# Patient Record
Sex: FEMALE | Race: AMERICAN INDIAN OR ALASKA NATIVE | HISPANIC OR LATINO | Employment: FULL TIME | URBAN - METROPOLITAN AREA
[De-identification: names, ages, dates, MRNs, and addresses within clinical notes are randomized per-mention and may not be internally consistent; named-entity substitution may affect disease eponyms.]

---
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
## 2019-01-04 ENCOUNTER — OFFICE VISIT (OUTPATIENT)
Dept: FAMILY MEDICINE | Facility: CLINIC | Age: 19
End: 2019-01-04
Payer: COMMERCIAL

## 2019-01-04 VITALS
HEIGHT: 65 IN | TEMPERATURE: 98.1 F | BODY MASS INDEX: 45.9 KG/M2 | WEIGHT: 275.5 LBS | OXYGEN SATURATION: 99 % | RESPIRATION RATE: 24 BRPM | SYSTOLIC BLOOD PRESSURE: 130 MMHG | DIASTOLIC BLOOD PRESSURE: 66 MMHG | HEART RATE: 100 BPM

## 2019-01-04 DIAGNOSIS — F90.9 ATTENTION DEFICIT HYPERACTIVITY DISORDER (ADHD), UNSPECIFIED ADHD TYPE: Primary | ICD-10-CM

## 2019-01-04 DIAGNOSIS — E66.01 CLASS 3 SEVERE OBESITY DUE TO EXCESS CALORIES WITHOUT SERIOUS COMORBIDITY WITH BODY MASS INDEX (BMI) OF 45.0 TO 49.9 IN ADULT (H): ICD-10-CM

## 2019-01-04 DIAGNOSIS — E66.813 CLASS 3 SEVERE OBESITY DUE TO EXCESS CALORIES WITHOUT SERIOUS COMORBIDITY WITH BODY MASS INDEX (BMI) OF 45.0 TO 49.9 IN ADULT (H): ICD-10-CM

## 2019-01-04 DIAGNOSIS — F32.5 MAJOR DEPRESSION IN COMPLETE REMISSION (H): ICD-10-CM

## 2019-01-04 PROCEDURE — 99214 OFFICE O/P EST MOD 30 MIN: CPT | Performed by: NURSE PRACTITIONER

## 2019-01-04 RX ORDER — DEXTROAMPHETAMINE SACCHARATE, AMPHETAMINE ASPARTATE MONOHYDRATE, DEXTROAMPHETAMINE SULFATE, AMPHETAMINE SULFATE 6.25; 6.25; 6.25; 6.25 MG/1; MG/1; MG/1; MG/1
1 CAPSULE, EXTENDED RELEASE ORAL DAILY
Qty: 30 CAPSULE | Refills: 0 | Status: SHIPPED | OUTPATIENT
Start: 2019-01-04 | End: 2019-02-05

## 2019-01-04 ASSESSMENT — MIFFLIN-ST. JEOR: SCORE: 2025.54

## 2019-01-04 ASSESSMENT — ANXIETY QUESTIONNAIRES
3. WORRYING TOO MUCH ABOUT DIFFERENT THINGS: SEVERAL DAYS
1. FEELING NERVOUS, ANXIOUS, OR ON EDGE: SEVERAL DAYS
7. FEELING AFRAID AS IF SOMETHING AWFUL MIGHT HAPPEN: SEVERAL DAYS
2. NOT BEING ABLE TO STOP OR CONTROL WORRYING: SEVERAL DAYS
5. BEING SO RESTLESS THAT IT IS HARD TO SIT STILL: SEVERAL DAYS
GAD7 TOTAL SCORE: 7
6. BECOMING EASILY ANNOYED OR IRRITABLE: SEVERAL DAYS
IF YOU CHECKED OFF ANY PROBLEMS ON THIS QUESTIONNAIRE, HOW DIFFICULT HAVE THESE PROBLEMS MADE IT FOR YOU TO DO YOUR WORK, TAKE CARE OF THINGS AT HOME, OR GET ALONG WITH OTHER PEOPLE: SOMEWHAT DIFFICULT

## 2019-01-04 ASSESSMENT — PATIENT HEALTH QUESTIONNAIRE - PHQ9
SUM OF ALL RESPONSES TO PHQ QUESTIONS 1-9: 6
5. POOR APPETITE OR OVEREATING: SEVERAL DAYS

## 2019-01-04 ASSESSMENT — PAIN SCALES - GENERAL: PAINLEVEL: NO PAIN (0)

## 2019-01-04 NOTE — LETTER
My Depression Action Plan  Name: Tana Hernandez   Date of Birth 2000  Date: 1/4/2019    My doctor: Deana Wood   My clinic: [address] [address] [phone]          GREEN    ZONE   Good Control    What it looks like:     Things are going generally well. You have normal up s and down s. You may even feel depressed from time to time, but bad moods usually last less than a day.   What you need to do:  1. Continue to care for yourself (see self care plan)  2. Check your depression survival kit and update it as needed  3. Follow your physician s recommendations including any medication.  4. Do not stop taking medication unless you consult with your physician first.           YELLOW         ZONE Getting Worse    What it looks like:     Depression is starting to interfere with your life.     It may be hard to get out of bed; you may be starting to isolate yourself from others.    Symptoms of depression are starting to last most all day and this has happened for several days.     You may have suicidal thoughts but they are not constant.   What you need to do:     1. Call your care team, your response to treatment will improve if you keep your care team informed of your progress. Yellow periods are signs an adjustment may need to be made.     2. Continue your self-care, even if you have to fake it!    3. Talk to someone in your support network    4. Open up your depression survival kit           RED    ZONE Medical Alert - Get Help    What it looks like:     Depression is seriously interfering with your life.     You may experience these or other symptoms: You can t get out of bed most days, can t work or engage in other necessary activities, you have trouble taking care of basic hygiene, or basic responsibilities, thoughts of suicide or death that will not go away, self-injurious behavior.     What you need to do:  1. Call your care team and  request a same-day appointment. If they are not available (weekends or after hours) call your local crisis line, emergency room or 911.            Depression Self Care Plan / Survival Kit    Self-Care for Depression  Here s the deal. Your body and mind are really not as separate as most people think.  What you do and think affects how you feel and how you feel influences what you do and think. This means if you do things that people who feel good do, it will help you feel better.  Sometimes this is all it takes.  There is also a place for medication and therapy depending on how severe your depression is, so be sure to consult with your medical provider and/ or Behavioral Health Consultant if your symptoms are worsening or not improving.     In order to better manage my stress, I will:    Exercise  Get some form of exercise, every day. This will help reduce pain and release endorphins, the  feel good  chemicals in your brain. This is almost as good as taking antidepressants!  This is not the same as joining a gym and then never going! (they count on that by the way ) It can be as simple as just going for a walk or doing some gardening, anything that will get you moving.      Hygiene   Maintain good hygiene (Get out of bed in the morning, Make your bed, Brush your teeth, Take a shower, and Get dressed like you were going to work, even if you are unemployed).  If your clothes don't fit try to get ones that do.    Diet  I will strive to eat foods that are good for me, drink plenty of water, and avoid excessive sugar, caffeine, alcohol, and other mood-altering substances.  Some foods that are helpful in depression are: complex carbohydrates, B vitamins, flaxseed, fish or fish oil, fresh fruits and vegetables.    Psychotherapy  I agree to participate in Individual Therapy (if recommended).    Medication  If prescribed medications, I agree to take them.  Missing doses can result in serious side effects.  I understand that  drinking alcohol, or other illicit drug use, may cause potential side effects.  I will not stop my medication abruptly without first discussing it with my provider.    Staying Connected With Others  I will stay in touch with my friends, family members, and my primary care provider/team.    Use your imagination  Be creative.  We all have a creative side; it doesn t matter if it s oil painting, sand castles, or mud pies! This will also kick up the endorphins.    Witness Beauty  (AKA stop and smell the roses) Take a look outside, even in mid-winter. Notice colors, textures. Watch the squirrels and birds.     Service to others  Be of service to others.  There is always someone else in need.  By helping others we can  get out of ourselves  and remember the really important things.  This also provides opportunities for practicing all the other parts of the program.    Humor  Laugh and be silly!  Adjust your TV habits for less news and crime-drama and more comedy.    Control your stress  Try breathing deep, massage therapy, biofeedback, and meditation. Find time to relax each day.     My support system    Clinic Contact:  Phone number:    Contact 1:  Phone number:    Contact 2:  Phone number:    Taoism/:  Phone number:    Therapist:  Phone number:    Local crisis center:    Phone number:    Other community support:  Phone number:

## 2019-01-04 NOTE — PROGRESS NOTES
"  SUBJECTIVE:   Tana Hernandez is a 18 year old female who presents to clinic today for the following health issues:      Medication Followup of Prozac, Adderall    Taking Medication as prescribed: yes    Side Effects:  None    Medication Helping Symptoms:  Yes, with Adderall, patient states she can tell when med wears off and then can tell when the next dose \"kicks\" in. Wondering about changing brands as this was discussed prior. Prozac doing well       Piedad would like to try my days again.  She felt that it worked well without any side effects.  She is feeling a little more jittery and anxious with the Adderall.  She did get a prior authorization from her insurance company last July, and this is effective until 2021    She otherwise states she is doing well.  Works as a PCA part-time.  She is unhappy about her relationship with her mother.  She would like to be able to get along with her mother, but states she is an alcoholic, and her drinking really bothers her.  1 of her aunts is discussing organizing and intervention.    She has some concern about her weight.  She states she is vague and, but does tend to snack on junk food.  She did have an appointment scheduled with nutritionist but overslept and missed that appointment.  She is concerned about focusing too much on counting calories and such, as she tends to then obsess, and had an eating disorder when she was in high school.    Problem list and histories reviewed & adjusted, as indicated.  Additional history: as documented    BP Readings from Last 3 Encounters:   01/04/19 130/66 (96 %/ 48 %)*   12/12/18 118/78   10/09/18 112/80 (46 %/ 94 %)*     *BP percentiles are based on the August 2017 AAP Clinical Practice Guideline for girls    Wt Readings from Last 3 Encounters:   01/04/19 125 kg (275 lb 8 oz) (>99 %)*   12/12/18 128.8 kg (284 lb) (>99 %)*   10/09/18 109 kg (240 lb 6.4 oz) (>99 %)*     * Growth percentiles are based on CDC (Girls, 2-20 Years) data. " "                   Reviewed and updated as needed this visit by clinical staff       Reviewed and updated as needed this visit by Provider         ROS:  Constitutional, HEENT, cardiovascular, pulmonary, gi and gu systems are negative, except as otherwise noted.    OBJECTIVE:     /66   Pulse 100   Temp 98.1  F (36.7  C) (Temporal)   Resp 24   Ht 1.651 m (5' 5\")   Wt 125 kg (275 lb 8 oz)   SpO2 99%   BMI 45.85 kg/m    Body mass index is 45.85 kg/m .   GENERAL: Very pleasant overweight female in no acute distress  RESP: lungs clear to auscultation - no rales, rhonchi or wheezes  CV: regular rates and rhythm, normal S1 S2, no S3 or S4 and no murmur, click or rub  PSYCH: mentation appears normal, affect normal/bright        ASSESSMENT/PLAN:     Problem List Items Addressed This Visit        Medium    Attention deficit hyperactivity disorder (ADHD), unspecified ADHD type    Relevant Medications    Amphet-Dextroamphet 3-Bead ER (MYDAYIS) 25 MG CP24           Prescription written for my day is 25 mg 1 capsule daily.  She has an appointment scheduled first part of February for a well exam, will review her response to my day us at that time    Encouraged to schedule an appointment with nutritionist to help devise an eating plan.  Definitely would like to see her less than 200 pounds.  Encouraged to engage in active form of exercise.  Eat more vegetables and fruits, avoid snacks.  She is vegetarian, so was not eating meat.  She does eat eggs.  Discussed some of these protein sources, but she is concerned about focusing on calories and not wanting to get back into an eating disorder situation.  I think the input from a nutritionist will be extremely helpful for her    She is doing very well on current dose of Prozac.  Does score 6 on a PHQ 9, but this tends to be primarily related to sleeping too much.  She denies any thoughts of self-harm    JAMA Landa [facility]  "

## 2019-01-05 ASSESSMENT — ANXIETY QUESTIONNAIRES: GAD7 TOTAL SCORE: 7

## 2019-01-05 NOTE — PROGRESS NOTES
SUBJECTIVE:  Patient was referred to me by Deana Wood NP for a consultation regarding Nexplanon device for contraceptive management.  She is requesting this device as it is more long term and she will not have to remember to take anything.    We reviewed other methods of birth control, including the pros and cons of other options including IUD.    Patient read through the information on Nexplanon and has no particular questions.  I gave her a copy of the patient handout from the company's website for review.    Patient still is very interested in using this form of birth control, and has no further questions for me.  Past Medical History:   Diagnosis Date     Anxiety      Depressive disorder        No past surgical history on file.       Allergies   Allergen Reactions     Wellbutrin [Bupropion] Hives       Current Outpatient Medications   Medication     Amphet-Dextroamphet 3-Bead ER (MYDAYIS) 25 MG CP24     FLUoxetine (PROZAC) 40 MG capsule     No current facility-administered medications for this visit.        No family history on file.    Social History     Socioeconomic History     Marital status: Single     Spouse name: Not on file     Number of children: Not on file     Years of education: Not on file     Highest education level: Not on file   Social Needs     Financial resource strain: Not on file     Food insecurity - worry: Not on file     Food insecurity - inability: Not on file     Transportation needs - medical: Not on file     Transportation needs - non-medical: Not on file   Occupational History     Not on file   Tobacco Use     Smoking status: Never Smoker     Smokeless tobacco: Never Used   Substance and Sexual Activity     Alcohol use: No     Alcohol/week: 0.0 oz     Drug use: No     Sexual activity: No   Other Topics Concern     Parent/sibling w/ CABG, MI or angioplasty before 65F 55M? Not Asked   Social History Narrative     Not on file       ASSESSMENT:  Contraception management  Nexplanon  consultation for birth control    PLAN:  Patient would like to proceed today with placement.  Discussed risks of bleeding and infection.  Also discussed the possibility of irregular bleeding for 3-6 months and then often cessation of menses.  Sometimes intermittent spotting will continue to occur, and is often the number one cited reason for early discontinuation.  Small risk of migration of the Nexplanon device or difficulty removing the implant can also occur.  We discussed importance of self palpation of the device to insure that it is still present.      No LMP recorded.  She has no further questions for me and signed the consent form for placement.  Pregnancy test done and was negative.    PROCEDURE:  The patient was placed on exam table in supine position with her left arm flexed at the elbow.  A point ~8-10 cm from the epicondyle was marked and another point 4 cm proximal to this sivan was made on the inner arm.  This area was cleansed with chlorhexidine prep and then injected with 2mL of 1% lidocaine with epi along the track of insertion.  The Nexplanon device was opened and it was confirmed that the diallo is in the device.  Under standard sterile fashion, a small stab incision was made with the device at the just distal to the sivan closer to the elbow.  The Nexplanon device was then inserted along the direction of the two skin marks just under the skin with care not to go too deep.  Once the insertion needle was fully inserted beneath the skin, the retractor on the device was then slowly pulled back to allow the introducer needle to retract into the device, leaving the diallo behind in the arm.  The diallo was palpable in the appropriate place under the skin.  The incision was noted to be hemostatic and a pressure bandage was placed over insertion site with 4x4 gauze, Kerlix and tape.  She was instructed to ice as needed.    Patient instructed to use barrier contraception for 1 week after which she should have full  contraception effect from Nexplanon device.  She was instructed to periodically palpate device to insure placement and was invited to follow-up with me if she has any issues or questions regarding the device.  She will need to follow-up in 3 years for removal, at which time she may have replacement device inserted for continued use of this form of contraception.    Patient will follow up with Deana Wood NP for other issues and will see me for issues regarding Nexplanon device.    In addition to the Nexplanon procedure, I spent 15 minutes of face to face time with the patient, >50% of which was spent counseling on contraception options.      Kristofer Cunningham MD

## 2019-01-05 NOTE — PATIENT INSTRUCTIONS
NEXPLANON AFTERCARE INSTRUCTIONS     Keep dressing on and dry for 24 hours, then remove wrap.  Replace bandaid daily for 5 days. Keep your user card in a safe place where you'll remember it.      You may have some pain at the site of the Nexplanon insertion. You can help relieve the discomfort with Tylenol (acetaminophen), Aspirin or Advil (ibuprofen). If your discomfort worsens or you notice redness spreading on the skin around the insertion site, please call the clinic.       Irregular bleeding is common with Nexplanon, especially in the first 6-12 months of use. After one year, approximately 20% of women who use Nexplanon will stop having periods completely. Some women have longer, heavier periods. Some women will have increased spotting between periods. You may find that your periods may be hard to predict.       The Nexplanon does not protect against sexually transmitted infections including the AIDS virus (HIV), warts (HPV), gonorrhea, Chlamydia, and herpes. Condoms should be used to decrease the risk sexually transmitted infections. If you think that you have been exposed to a sexually transmitted infection, please call the clinic.       If you had Nexplanon placed for birth control, it is effective immediately if it was inserted within five days after the start of your period. If you have Nexplanon inserted at any other time during your menstrual cycle, use another method of birth control, like condoms for at least 7 days.       The Nexplanon should be removed and/or replaced by a health care provider after three years.   Warning Signs   Call the clinic if any of the following occurs:     You have bleeding, pus, or increasing redness, or pain at insertion site.     You have fever or chills     The implant comes out or you have concerns about its location.     You have a positive pregnancy test or suspect you might be pregnant.

## 2019-01-07 ENCOUNTER — OFFICE VISIT (OUTPATIENT)
Dept: FAMILY MEDICINE | Facility: CLINIC | Age: 19
End: 2019-01-07
Payer: COMMERCIAL

## 2019-01-07 ENCOUNTER — TELEPHONE (OUTPATIENT)
Dept: FAMILY MEDICINE | Facility: CLINIC | Age: 19
End: 2019-01-07

## 2019-01-07 VITALS
SYSTOLIC BLOOD PRESSURE: 128 MMHG | DIASTOLIC BLOOD PRESSURE: 66 MMHG | TEMPERATURE: 98.9 F | RESPIRATION RATE: 20 BRPM | HEART RATE: 76 BPM | BODY MASS INDEX: 46.98 KG/M2 | HEIGHT: 65 IN | OXYGEN SATURATION: 98 % | WEIGHT: 282 LBS

## 2019-01-07 DIAGNOSIS — Z30.017 ENCOUNTER FOR INITIAL PRESCRIPTION OF IMPLANTABLE SUBDERMAL CONTRACEPTIVE: Primary | ICD-10-CM

## 2019-01-07 DIAGNOSIS — Z23 NEED FOR PROPHYLACTIC VACCINATION AND INOCULATION AGAINST INFLUENZA: ICD-10-CM

## 2019-01-07 DIAGNOSIS — Z30.017 INSERTION OF IMPLANTABLE SUBDERMAL CONTRACEPTIVE: ICD-10-CM

## 2019-01-07 LAB — HCG UR QL: NEGATIVE

## 2019-01-07 PROCEDURE — 99213 OFFICE O/P EST LOW 20 MIN: CPT | Mod: 25 | Performed by: FAMILY MEDICINE

## 2019-01-07 PROCEDURE — 81025 URINE PREGNANCY TEST: CPT | Performed by: FAMILY MEDICINE

## 2019-01-07 PROCEDURE — 90471 IMMUNIZATION ADMIN: CPT | Performed by: FAMILY MEDICINE

## 2019-01-07 PROCEDURE — 90688 IIV4 VACCINE SPLT 0.5 ML IM: CPT | Performed by: FAMILY MEDICINE

## 2019-01-07 PROCEDURE — 11981 INSERTION DRUG DLVR IMPLANT: CPT | Performed by: FAMILY MEDICINE

## 2019-01-07 ASSESSMENT — ANXIETY QUESTIONNAIRES
7. FEELING AFRAID AS IF SOMETHING AWFUL MIGHT HAPPEN: NOT AT ALL
IF YOU CHECKED OFF ANY PROBLEMS ON THIS QUESTIONNAIRE, HOW DIFFICULT HAVE THESE PROBLEMS MADE IT FOR YOU TO DO YOUR WORK, TAKE CARE OF THINGS AT HOME, OR GET ALONG WITH OTHER PEOPLE: NOT DIFFICULT AT ALL
5. BEING SO RESTLESS THAT IT IS HARD TO SIT STILL: NOT AT ALL
GAD7 TOTAL SCORE: 0
3. WORRYING TOO MUCH ABOUT DIFFERENT THINGS: NOT AT ALL
6. BECOMING EASILY ANNOYED OR IRRITABLE: NOT AT ALL
2. NOT BEING ABLE TO STOP OR CONTROL WORRYING: NOT AT ALL
1. FEELING NERVOUS, ANXIOUS, OR ON EDGE: NOT AT ALL

## 2019-01-07 ASSESSMENT — PAIN SCALES - GENERAL: PAINLEVEL: NO PAIN (0)

## 2019-01-07 ASSESSMENT — PATIENT HEALTH QUESTIONNAIRE - PHQ9
SUM OF ALL RESPONSES TO PHQ QUESTIONS 1-9: 8
5. POOR APPETITE OR OVEREATING: NOT AT ALL

## 2019-01-07 ASSESSMENT — MIFFLIN-ST. JEOR: SCORE: 2055.02

## 2019-01-07 NOTE — PROGRESS NOTES

## 2019-01-09 ASSESSMENT — ANXIETY QUESTIONNAIRES: GAD7 TOTAL SCORE: 0

## 2019-01-13 ENCOUNTER — OFFICE VISIT (OUTPATIENT)
Dept: URGENT CARE | Facility: RETAIL CLINIC | Age: 19
End: 2019-01-13
Payer: COMMERCIAL

## 2019-01-13 VITALS
TEMPERATURE: 98 F | SYSTOLIC BLOOD PRESSURE: 139 MMHG | OXYGEN SATURATION: 99 % | DIASTOLIC BLOOD PRESSURE: 88 MMHG | HEART RATE: 72 BPM

## 2019-01-13 DIAGNOSIS — B30.9 VIRAL CONJUNCTIVITIS: Primary | ICD-10-CM

## 2019-01-13 PROCEDURE — 99213 OFFICE O/P EST LOW 20 MIN: CPT | Performed by: FAMILY MEDICINE

## 2019-01-13 RX ORDER — POLYMYXIN B SULFATE AND TRIMETHOPRIM 1; 10000 MG/ML; [USP'U]/ML
1 SOLUTION OPHTHALMIC EVERY 4 HOURS
Qty: 1 BOTTLE | Refills: 0 | Status: SHIPPED | OUTPATIENT
Start: 2019-01-13 | End: 2019-02-05

## 2019-01-13 NOTE — PATIENT INSTRUCTIONS
Aleve 2 tabs am and pm for pain    Artificial tears as necessary    Fill prescription only if yellow eye drainage all day  Patient Education     Viral Conjunctivitis    Viral conjunctivitis (sometimes called pink eye) is a common infection of the eye. It is very contagious. Touching the infected eye, then touching another person passes this infection. It can also be spread from one eye to the other in this same way. The most common symptoms include redness, discharge from the eye, swollen eyelids, and a gritty or scratchy feeling in the eye.  This condition will take about 7 to 10 days to go away. Artificial tears (available without a prescription) are often recommended to moisten and clean the eyes. Antibiotic eye drops often are not recommended because they will not kill the virus. But sometimes they may be prescribed by eye doctors. This is to prevent a second, bacterial infection.  Home care    Apply a towel soaked in cool water to the affected eye 3 to 4 times a day (just before applying medicine to the eye).    It is common to have mucus drainage during the night, causing the eyelids to become crusted by morning. Use a warm, wet cloth to wipe this away.    Wash any cloths used to clean the eye after one use. Don't reuse them.    If antibiotic medicines are prescribed, take them exactly as directed. Don't stop taking them until you are told to.    You may use acetaminophen or ibuprofen to control pain, unless another medicine was prescribed. (Note: If you have chronic liver or kidney disease, or if you have ever had a stomach ulcer or gastrointestinal bleeding, talk with your healthcare provider before using these medicines.) Aspirin should never be used in anyone under 18 years of age who is ill with a fever. It may cause severe liver damage.    Wash your hands before and after touching the affected eye. This helps to prevent spreading the infection to your other eye and to others.    The infected person  should avoid sharing towels, washcloths, and bedding with others. This is to prevent spreading the infection.    This illness is contagious during the first week. Children with this illness should be kept out of day care and school until the redness clears.  Follow-up care  Follow up with your healthcare provider, or as advised.  When to seek medical advice  Call your healthcare provider right away if any of these occur:    Worsening vision    Increasing pain in the eye    Increasing swelling or redness of the eyelid    Redness spreading to the face around the eye    Large amount of green or yellow drainage from the eye    Severe itching in or around the eye    Fever of 100.4 F (38 C) or higher  Date Last Reviewed: 7/1/2017 2000-2018 [employer]. [address] [place] [address], [place] [address]. All rights reserved. This information is not intended as a substitute for professional medical care. Always follow your healthcare professional's instructions.

## 2019-01-13 NOTE — PROGRESS NOTES
SUBJECTIVE:  Tana Hernandez is a 18 year old female who presents complaining of moderate left eye mattering, redness for 1 day(s).   Onset/timing: rapid.    Associated Signs and Symptoms: nasal drainage and sinus congestion  Treatment measures tried include: flushed with water   Contact wearer : No    Past Medical History:   Diagnosis Date     Anxiety      Depressive disorder      Current Outpatient Medications   Medication Sig Dispense Refill     Amphet-Dextroamphet 3-Bead ER (MYDAYIS) 25 MG CP24 Take 1 tablet by mouth daily 30 capsule 0     FLUoxetine (PROZAC) 40 MG capsule Take 1 capsule (40 mg) by mouth daily 30 capsule 3     trimethoprim-polymyxin b (POLYTRIM) 73990-5.1 UNIT/ML-% ophthalmic solution Place 1 drop into both eyes every 4 hours for 7 days 1 Bottle 0     History   Smoking Status     Never Smoker   Smokeless Tobacco     Never Used       ROS:  Review of systems negative except as stated above.    OBJECTIVE:  /88   Pulse 72   Temp 98  F (36.7  C) (Oral)   LMP 01/06/2019 (Exact Date)   SpO2 99%   General: no acute distress  Eye exam: right eye normal lid, conjunctiva, cornea, pupil and fundus, left eye abnormal findings: conjunctivitis with erythema.  Ears: normal canals, TMs bilaterally, normal TM mobility  Nose: NORMAL - no drainage, turbinates normal in size.  Neck: supple, non-tender, free range of motion, no adenopathy  Heart: NORMAL - regular rate and rhythm without murmur.  Lungs: normal and clear to auscultation    ASSESSMENT:  Viral Conjunctivitis    PLAN:  Warm packs for comfort. Hygiene measures discussed. Polytrim ophthalmic drops-1-2 drops in the affected eye(s) every 4 hours while awake for 3 days. Fill and use only  If yellow drain from eye all day.  Follow up with primary care provider if no improvement.

## 2019-01-14 ENCOUNTER — HOSPITAL ENCOUNTER (OUTPATIENT)
Dept: NUTRITION | Facility: CLINIC | Age: 19
Discharge: HOME | End: 2019-01-14
Attending: NURSE PRACTITIONER | Admitting: NURSE PRACTITIONER
Payer: COMMERCIAL

## 2019-01-14 PROCEDURE — 97802 MEDICAL NUTRITION INDIV IN: CPT | Performed by: DIETITIAN, REGISTERED

## 2019-01-15 NOTE — PROGRESS NOTES
"[facility]  Medical Nutrition Therapy    Visit Type: Initial Assessment    Tana Hernandez referred by JAMA Landa, CNP for MNT related to Obesity    Nutrition Assessment:    Anthropometrics    Height: 5' 5\"      Weight: 269 lbs/122 kg  (taken during visit)      IBW (kg): 57 kg      BMI: 45 (obesity grade III)     Weight History:  Wt Readings from Last 4 Encounters:   01/07/19 127.9 kg (282 lb) (>99 %)*   01/04/19 125 kg (275 lb 8 oz) (>99 %)*   12/12/18 128.8 kg (284 lb) (>99 %)*   10/09/18 109 kg (240 lb 6.4 oz) (>99 %)*     * Growth percentiles are based on CDC (Girls, 2-20 Years) data.   -Per chart above, pt gained 42 lbs/17.5% over the past 3 months, prior to 1/07/18, when she started to drastically reduce her oral intake.  -Over the past week, pt has lost 13 lbs/4.6%    Nutrition History  -Pt reported struggling with body image issues and has a hx of an eating disorder   -Pt reports triggers for ED: food labels, numbers (weight, calories), and being asked to change eating habits too quickly  -Pt weighs herself daily at home  -Reports that her wt had been steadily increasing over the past year so she decided to drastically reduce her oral intake over the past week, consuming only sugary coffee drinks and minimal food  -Pt is a vegetarian and strives to consume vegan meals, but will have cheese and eggs on occasion  -Hx of anxiety, depression, and ADHD (on px Adderall)  -Pt reports that Adderall suppresses her appetite     Physical Activity  -Pt reports that she is busy most of the day, on her feet  -Pt has a carpentry hobby and is building a cabinet at the moment   -Pt enjoys cleaning and spends a lot of time each day cleaning her home  -Reports that her heart rate is elevated during these activities    Nutrition Prescription  Energy:  6890-9346 kcal/day  (using, ABW and for wt loss of 1-2 lb/week)   Protein:   g/day  (.8-1g/kg)       Fluid:  1900 mL/day  (25 mL/kg using ABW) "     Food Record  Daily intake over the past week:  -2 sweetened [place] or Starbucks coffee drinks (caramel macchiato usually)  -Mt Dew Kickstart (16 oz)  -Starbucks oatmeal  -1.5 L water  -Daily oral intake is inadequate for meeting daily kcal, protein, and fiber needs, adequate in fluids  -Pt reports that she feels hungry at night but ignores the urge to eat. Low hunger during the day, which she believes is d/t Adderall side effect     Nutrition Diagnosis:  PES: Inadequate oral intake r/t disordered eating a/e/b usual dietary intake meeting <50% of estimated needs and wt loss of 13 lbs/4.6% over the past week     Nutrition Intervention:  -Recommended getting in-tune with physical signs of hunger and fullness; discussed using the hunger/fullness scale throughout the day, at least every 3 hrs  -Suggested starting meditation on a daily basis to help with anxiety (guided meditation, listening to relaxing music, going for a walk outside, or whatever feels relaxing)  -Encouraged pt to put the scale away and to not focus on the numbers, but rather how she feels  -Encouraged pt to start incorporating some of each of the food groups into her daily, but slow and gradually, as she feels comfortable  -Discussed using the palm of the hand for an estimated serving size of foods, so she doesn't have to look at the food label  -Encouraged positive self-image and to continue doing physical activities that she enjoys  -Discussed using the breath test to rate intensity of PA (aiming to get aerobic activity in each day)     Nutrition Goals:  1) Start paying attention to hunger/fullness cues  2) Meditate daily  3) Eat at least 2 snacks per day, consisting of a protein and fiber (WG, fruit, or veg)   4) Get 30 min of PA per day, using the breath test as a guide for endurance (should still be able to talk during activity)    Nutrition Follow Up / Monitoring:  Food/beverage intake, weight, scale use, PA, anxiety level     Nutrition  Recommendations:   Patient to follow-up with RD in 2 weeks.  Patient has RD contact information to call/email if needed.    Start Time: 11:05am  End Time: 12:07pm    Samira Amos RD, LD  Clinical Dietitian  [facility]: [phone]  [facility]: [phone]

## 2019-01-18 ENCOUNTER — TELEPHONE (OUTPATIENT)
Dept: FAMILY MEDICINE | Facility: CLINIC | Age: 19
End: 2019-01-18

## 2019-01-18 NOTE — TELEPHONE ENCOUNTER
Reason for call:  Patient reporting a symptom    Symptom or request: Was seen in  [facility] on Sunday for viral conjunctivitis.  Piedad reports that her eye is not any better and almost seems worse.  Piedad states when she woke up this morning there looked to be dried blood that came from the eye    Duration (how long have symptoms been present): 5 days    Have you been treated for this before? Yes    Additional comments: Piedad was given a prescription for eye drops but thought she wasn't supposed to fill it because the conjunctivitis was viral.    Phone Number patient can be reached at:  [phone]    Best Time:  any    Can we leave a detailed message on this number:  YES    Call taken on 1/18/2019 at 9:21 AM by Lindsay Mendoza

## 2019-01-18 NOTE — TELEPHONE ENCOUNTER
Called patient, instructed her to start on the eye drops/ointment that was Rx to her at the [facility] visit per provider. Patient agrees and states understanding. She will call back if necessary. Johny/MA

## 2019-02-05 ENCOUNTER — OFFICE VISIT (OUTPATIENT)
Dept: FAMILY MEDICINE | Facility: CLINIC | Age: 19
End: 2019-02-05
Payer: COMMERCIAL

## 2019-02-05 VITALS
DIASTOLIC BLOOD PRESSURE: 80 MMHG | OXYGEN SATURATION: 99 % | RESPIRATION RATE: 20 BRPM | HEART RATE: 100 BPM | HEIGHT: 65 IN | WEIGHT: 258.2 LBS | TEMPERATURE: 97.7 F | SYSTOLIC BLOOD PRESSURE: 130 MMHG | BODY MASS INDEX: 43.02 KG/M2

## 2019-02-05 DIAGNOSIS — Z00.00 WELL ADULT EXAM: Primary | ICD-10-CM

## 2019-02-05 DIAGNOSIS — I38 DIASTOLIC MURMUR: ICD-10-CM

## 2019-02-05 DIAGNOSIS — E56.9 VITAMIN DEFICIENCY: ICD-10-CM

## 2019-02-05 DIAGNOSIS — Z11.3 SCREEN FOR STD (SEXUALLY TRANSMITTED DISEASE): ICD-10-CM

## 2019-02-05 DIAGNOSIS — F90.9 ATTENTION DEFICIT HYPERACTIVITY DISORDER (ADHD), UNSPECIFIED ADHD TYPE: ICD-10-CM

## 2019-02-05 LAB — HGB BLD-MCNC: 13.9 G/DL (ref 11.7–15.7)

## 2019-02-05 PROCEDURE — 99213 OFFICE O/P EST LOW 20 MIN: CPT | Mod: 25 | Performed by: NURSE PRACTITIONER

## 2019-02-05 PROCEDURE — 85018 HEMOGLOBIN: CPT | Performed by: NURSE PRACTITIONER

## 2019-02-05 PROCEDURE — 82306 VITAMIN D 25 HYDROXY: CPT | Performed by: NURSE PRACTITIONER

## 2019-02-05 PROCEDURE — 87591 N.GONORRHOEAE DNA AMP PROB: CPT | Performed by: NURSE PRACTITIONER

## 2019-02-05 PROCEDURE — 36415 COLL VENOUS BLD VENIPUNCTURE: CPT | Performed by: NURSE PRACTITIONER

## 2019-02-05 PROCEDURE — 99395 PREV VISIT EST AGE 18-39: CPT | Performed by: NURSE PRACTITIONER

## 2019-02-05 PROCEDURE — 87491 CHLMYD TRACH DNA AMP PROBE: CPT | Performed by: NURSE PRACTITIONER

## 2019-02-05 RX ORDER — DEXTROAMPHETAMINE SACCHARATE, AMPHETAMINE ASPARTATE MONOHYDRATE, DEXTROAMPHETAMINE SULFATE, AMPHETAMINE SULFATE 6.25; 6.25; 6.25; 6.25 MG/1; MG/1; MG/1; MG/1
1 CAPSULE, EXTENDED RELEASE ORAL DAILY
Qty: 30 CAPSULE | Refills: 0 | Status: SHIPPED | OUTPATIENT
Start: 2019-02-05 | End: 2019-02-05

## 2019-02-05 RX ORDER — DEXTROAMPHETAMINE SACCHARATE, AMPHETAMINE ASPARTATE MONOHYDRATE, DEXTROAMPHETAMINE SULFATE, AMPHETAMINE SULFATE 6.25; 6.25; 6.25; 6.25 MG/1; MG/1; MG/1; MG/1
1 CAPSULE, EXTENDED RELEASE ORAL DAILY
Qty: 30 CAPSULE | Refills: 0 | Status: SHIPPED | OUTPATIENT
Start: 2019-02-05 | End: 2019-04-24

## 2019-02-05 ASSESSMENT — ENCOUNTER SYMPTOMS
EYE PAIN: 1
FREQUENCY: 0
HEARTBURN: 0
HEMATOCHEZIA: 0
WEAKNESS: 0
DYSURIA: 0
JOINT SWELLING: 0
ARTHRALGIAS: 0
HEMATURIA: 0
FEVER: 0
NAUSEA: 0
NERVOUS/ANXIOUS: 1
DIZZINESS: 0
COUGH: 0
CHILLS: 0
CONSTIPATION: 0
BREAST MASS: 0
SORE THROAT: 0
ABDOMINAL PAIN: 0
MYALGIAS: 0
DIARRHEA: 0
PALPITATIONS: 0
SHORTNESS OF BREATH: 0
PARESTHESIAS: 0
HEADACHES: 1

## 2019-02-05 ASSESSMENT — PAIN SCALES - GENERAL: PAINLEVEL: NO PAIN (0)

## 2019-02-05 ASSESSMENT — MIFFLIN-ST. JEOR: SCORE: 1947.07

## 2019-02-05 NOTE — PROGRESS NOTES
SUBJECTIVE:   CC: Tana Hernandez is an 18 year old woman who presents for preventive health visit.     Physical   Annual:     Getting at least 3 servings of Calcium per day:  NO    Bi-annual eye exam:  Yes    Dental care twice a year:  Yes    Sleep apnea or symptoms of sleep apnea:  None    Diet:  Vegetarian/vegan    Frequency of exercise:  2-3 days/week    Duration of exercise:  15-30 minutes    Taking medications regularly:  No    Barriers to taking medications:  Problems remembering to take them    Additional concerns today:  No    PHQ-2 Total Score: 0              Today's PHQ-2 Score:   PHQ-2 ( 1999 [employer]) 2/5/2019   Q1: Little interest or pleasure in doing things 0   Q2: Feeling down, depressed or hopeless 0   PHQ-2 Score 0   Q1: Little interest or pleasure in doing things Not at all   Q2: Feeling down, depressed or hopeless Not at all   PHQ-2 Score 0       Abuse: Current or Past(Physical, Sexual or Emotional)- No  Do you feel safe in your environment? Yes    Social History     Tobacco Use     Smoking status: Never Smoker     Smokeless tobacco: Never Used   Substance Use Topics     Alcohol use: No     Alcohol/week: 0.0 oz     Alcohol Use 2/5/2019   If you drink alcohol do you typically have greater than 3 drinks per day OR greater than 7 drinks per week? No       Reviewed orders with patient.  Reviewed health maintenance and updated orders accordingly - Yes  BP Readings from Last 3 Encounters:   02/05/19 130/80 (96 %/ 94 %)*   01/13/19 139/88 (99 %/ >99 %)*   01/07/19 128/66 (93 %/ 48 %)*     *BP percentiles are based on the August 2017 AAP Clinical Practice Guideline for girls    Wt Readings from Last 3 Encounters:   02/05/19 117.1 kg (258 lb 3.2 oz) (>99 %)*   01/07/19 127.9 kg (282 lb) (>99 %)*   01/04/19 125 kg (275 lb 8 oz) (>99 %)*     * Growth percentiles are based on CDC (Girls, 2-20 Years) data.                    Mammogram not appropriate for this patient based on age.    Pertinent mammograms are  "reviewed under the imaging tab.  History of abnormal Pap smear: NO - under age 21, PAP not appropriate for age     Reviewed and updated as needed this visit by clinical staff  Tobacco  Allergies  Meds  Med Hx  Surg Hx  Fam Hx  Soc Hx        Reviewed and updated as needed this visit by Provider        Past Medical History:   Diagnosis Date     Anxiety      Depressive disorder       History reviewed. No pertinent surgical history.    Review of Systems   Constitutional: Negative for chills and fever.   HENT: Negative for congestion, ear pain, hearing loss and sore throat.    Eyes: Positive for pain. Negative for visual disturbance.   Respiratory: Negative for cough and shortness of breath.    Cardiovascular: Negative for chest pain, palpitations and peripheral edema.   Gastrointestinal: Negative for abdominal pain, constipation, diarrhea, heartburn, hematochezia and nausea.   Breasts:  Negative for tenderness, breast mass and discharge.   Genitourinary: Negative for dysuria, frequency, genital sores, hematuria, pelvic pain, urgency, vaginal bleeding and vaginal discharge.   Musculoskeletal: Negative for arthralgias, joint swelling and myalgias.   Skin: Negative for rash.   Neurological: Positive for headaches. Negative for dizziness, weakness and paresthesias.   Psychiatric/Behavioral: Positive for mood changes. The patient is nervous/anxious.        She recently had conjunctivitis, states she had some discomfort in the eye secondary to that.  It has resolved.  She also thinks some headache discomfort may be due to her vision and is planning to schedule an appointment to have her eyes checked    She is presently taking Prozac 40 mg for management of depression and anxiety  OBJECTIVE:   /80   Pulse 100   Temp 97.7  F (36.5  C) (Temporal)   Resp 20   Ht 1.651 m (5' 5\")   Wt 117.1 kg (258 lb 3.2 oz)   LMP 01/06/2019 (Exact Date)   SpO2 99%   BMI 42.97 kg/m    Physical Exam  GENERAL: healthy, alert and " "no distress  EYES: Eyes grossly normal to inspection, PERRL and conjunctivae and sclerae normal  HENT: ear canals and TM's normal, nose and mouth without ulcers or lesions  NECK: no adenopathy, no asymmetry, masses, or scars and thyroid normal to palpation  RESP: lungs clear to auscultation - no rales, rhonchi or wheezes  CV: Rate and rhythm regular S1-S2 with a high-pitched squeaky diastolic murmur.  ABDOMEN: soft, nontender, no hepatosplenomegaly, no masses and bowel sounds normal  MS: no gross musculoskeletal defects noted, no edema  SKIN: no suspicious lesions or rashes  NEURO: Normal strength and tone, mentation intact and speech normal  PSYCH: mentation appears normal, affect normal/bright    Diagnostic Test Results:  Results for orders placed or performed in visit on 02/05/19 (from the past 24 hour(s))   Hemoglobin   Result Value Ref Range    Hemoglobin 13.9 11.7 - 15.7 g/dL       ASSESSMENT/PLAN:       ICD-10-CM    1. Well adult exam Z00.00 Hemoglobin   2. Diastolic murmur I38 Echocardiogram Complete   3. Screen for STD (sexually transmitted disease) Z11.3 NEISSERIA GONORRHOEA PCR     CHLAMYDIA TRACHOMATIS PCR   4. Vitamin deficiency E56.9 Vitamin D Deficiency   5. Attention deficit hyperactivity disorder (ADHD), unspecified ADHD type F90.9 Amphet-Dextroamphet 3-Bead ER (MYDAYIS) 25 MG CP24     DISCONTINUED: Amphet-Dextroamphet 3-Bead ER (MYDAYIS) 25 MG CP24       COUNSELING:  Reviewed preventive health counseling, as reflected in patient instructions       Regular exercise       Healthy diet/nutrition       Vision screening       Osteoporosis Prevention/Bone Health       Safe sex practices/STD prevention    BP Readings from Last 1 Encounters:   02/05/19 130/80 (96 %/ 94 %)*     *BP percentiles are based on the August 2017 AAP Clinical Practice Guideline for girls     Estimated body mass index is 42.97 kg/m  as calculated from the following:    Height as of this encounter: 1.651 m (5' 5\").    Weight as of " this encounter: 117.1 kg (258 lb 3.2 oz).    BP Screening:   Last 3 BP Readings:    BP Readings from Last 3 Encounters:   02/05/19 130/80 (96 %/ 94 %)*   01/13/19 139/88 (99 %/ >99 %)*   01/07/19 128/66 (93 %/ 48 %)*     *BP percentiles are based on the August 2017 AAP Clinical Practice Guideline for girls       The following was recommended to the patient:  Re-screen BP within a year and recommended lifestyle modifications  Weight management plan: Discussed healthy diet and exercise guidelines     reports that  has never smoked. she has never used smokeless tobacco.      Counseling Resources:  ATP IV Guidelines  Pooled Cohorts Equation Calculator  Breast Cancer Risk Calculator  FRAX Risk Assessment  ICSI Preventive Guidelines  Dietary Guidelines for Americans, 2010  USDA's MyPlate  ASA Prophylaxis  Lung CA Screening    JAMA Landa [facility] [employer] [facility]

## 2019-02-06 LAB
C TRACH DNA SPEC QL NAA+PROBE: NEGATIVE
DEPRECATED CALCIDIOL+CALCIFEROL SERPL-MC: 39 UG/L (ref 20–75)
N GONORRHOEA DNA SPEC QL NAA+PROBE: NEGATIVE
SPECIMEN SOURCE: NORMAL
SPECIMEN SOURCE: NORMAL

## 2019-02-07 ENCOUNTER — TELEPHONE (OUTPATIENT)
Dept: FAMILY MEDICINE | Facility: CLINIC | Age: 19
End: 2019-02-07

## 2019-02-07 ENCOUNTER — HOSPITAL ENCOUNTER (OUTPATIENT)
Dept: CARDIOLOGY | Facility: CLINIC | Age: 19
Discharge: HOME | End: 2019-02-07
Attending: NURSE PRACTITIONER | Admitting: NURSE PRACTITIONER
Payer: COMMERCIAL

## 2019-02-07 DIAGNOSIS — I38 DIASTOLIC MURMUR: ICD-10-CM

## 2019-02-07 PROCEDURE — 93306 TTE W/DOPPLER COMPLETE: CPT

## 2019-02-07 PROCEDURE — 93306 TTE W/DOPPLER COMPLETE: CPT | Mod: 26 | Performed by: INTERNAL MEDICINE

## 2019-02-07 NOTE — TELEPHONE ENCOUNTER
Reason for Call:  Request for results:    Name of test or procedure: ECHO    Date of test of procedure: 2/07/19    Location of the test or procedure: [facility] to leave the result message on voice mail or with a family member? YES    Phone number Patient can be reached at:  Cell number on file:    Telephone Information:   Mobile [phone]       Additional comments: patient would like to get these results, please call    Call taken on 2/7/2019 at 3:49 PM by Christine Ortiz

## 2019-02-07 NOTE — TELEPHONE ENCOUNTER
Normal results were reviewed with patient.  Uncertain as to why she had a murmur at the [facility], no valvular abnormalities noted other than very minimal regurgitation.  We will keep an eye on it.

## 2019-03-07 ENCOUNTER — TELEPHONE (OUTPATIENT)
Dept: FAMILY MEDICINE | Facility: CLINIC | Age: 19
End: 2019-03-07

## 2019-03-07 NOTE — TELEPHONE ENCOUNTER
Patient is due for a PHQ-9.  Index start date:2/09/2019  Index end date:6/09/2019    Please call patient. Pt is active on [employer]. I have sent a PHQ-9 via [employer] and will postpone the encounter. Winter Marcelo CMA ([facility])

## 2019-03-11 NOTE — TELEPHONE ENCOUNTER
2nd [employer] message sent. Winter Marcelo CMA ([facility])

## 2019-03-12 NOTE — TELEPHONE ENCOUNTER
Pt completed PHQ-9. I have replied to pt informing her to schedule an appt for a med recheck in the near future.     PHQ-9 SCORE 3/12/2019   PHQ-9 Total Score -   PHQ-9 Total Score MyChart 8 (Mild depression)   PHQ-9 Total Score 8     Winter Marcelo CMA ([facility])

## 2019-04-24 ENCOUNTER — OFFICE VISIT (OUTPATIENT)
Dept: FAMILY MEDICINE | Facility: CLINIC | Age: 19
End: 2019-04-24
Payer: COMMERCIAL

## 2019-04-24 VITALS
OXYGEN SATURATION: 100 % | TEMPERATURE: 97.1 F | DIASTOLIC BLOOD PRESSURE: 80 MMHG | BODY MASS INDEX: 43.08 KG/M2 | SYSTOLIC BLOOD PRESSURE: 120 MMHG | WEIGHT: 258.9 LBS | RESPIRATION RATE: 22 BRPM | HEART RATE: 89 BPM

## 2019-04-24 DIAGNOSIS — M24.80 GENERALIZED HYPERMOBILITY OF JOINTS: ICD-10-CM

## 2019-04-24 DIAGNOSIS — F90.9 ATTENTION DEFICIT HYPERACTIVITY DISORDER (ADHD), UNSPECIFIED ADHD TYPE: Primary | ICD-10-CM

## 2019-04-24 DIAGNOSIS — F32.5 MAJOR DEPRESSION IN COMPLETE REMISSION (H): ICD-10-CM

## 2019-04-24 PROCEDURE — 99213 OFFICE O/P EST LOW 20 MIN: CPT | Performed by: NURSE PRACTITIONER

## 2019-04-24 RX ORDER — FLUOXETINE 40 MG/1
40 CAPSULE ORAL DAILY
Qty: 30 CAPSULE | Refills: 5 | Status: SHIPPED | OUTPATIENT
Start: 2019-04-24 | End: 2019-06-05

## 2019-04-24 RX ORDER — DEXTROAMPHETAMINE SACCHARATE, AMPHETAMINE ASPARTATE MONOHYDRATE, DEXTROAMPHETAMINE SULFATE, AMPHETAMINE SULFATE 6.25; 6.25; 6.25; 6.25 MG/1; MG/1; MG/1; MG/1
1 CAPSULE, EXTENDED RELEASE ORAL DAILY
Qty: 30 CAPSULE | Refills: 0 | Status: SHIPPED | OUTPATIENT
Start: 2019-04-24 | End: 2019-06-05

## 2019-04-24 ASSESSMENT — PAIN SCALES - GENERAL: PAINLEVEL: MODERATE PAIN (4)

## 2019-04-24 NOTE — PROGRESS NOTES
SUBJECTIVE:   Tana Hernandez is a 18 year old female who presents to clinic today for the following   health issues:        Medication Followup of Mydayis, prozac    Taking Medication as prescribed: yes    Side Effects:  None    Medication Helping Symptoms:  yes       Joint Pain    Onset: month    Description:   Location: left hip  Character: Dull ache    Intensity: mild, moderate    Progression of Symptoms: worse    Accompanying Signs & Symptoms:  Other symptoms: clicking, hurts to lay on the hip    History:   Previous similar pain: no       Precipitating factors:   Trauma or overuse: no patient states she is very flexible, hyperflexable    Alleviating factors:  Improved by:  Ibuprofen    Therapies Tried and outcome: stretching, ibuprofen    The patient is a 18-year-old female in clinic today to follow-up management of ADD and depression.  She is taking Mydayis; this works very well for her without adverse side effect.  She would like to continue the current dose.  Her vital signs are stable.  She has no appetite issues.  She had been working on weight loss actually but has remained stable the last month.  She is feeling much better taking Prozac 40 mg.  Denies adverse side effect to medication.    Her concern today is regarding hypermobility of multiple joints.  She states that she has dislocated or partially dislocated her left shoulder, both knees, and recently states her left hip popped out of joint and then back in place.  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  She is concerned about the possibility of Gracia-Danlos syndrome.  She does not recall having dislocations as a young child.        Additional history: as documented    Reviewed  and updated as needed this visit by clinical staff  Tobacco  Allergies  Meds  Med Hx  Surg Hx  Fam Hx  Soc Hx        Reviewed and updated as needed this visit by Provider         BP Readings from Last 3 Encounters:   04/24/19 120/80    02/05/19 130/80   01/13/19 139/88    Wt Readings from Last 3 Encounters:   04/24/19 117.4 kg (258 lb 14.4 oz) (>99 %)*   02/05/19 117.1 kg (258 lb 3.2 oz) (>99 %)*   01/07/19 127.9 kg (282 lb) (>99 %)*     * Growth percentiles are based on [facility] (Girls, 2-20 Years) data.                    ROS:  Constitutional, HEENT, cardiovascular, pulmonary, gi and gu systems are negative, except as otherwise noted.    OBJECTIVE:     /80   Pulse 89   Temp 97.1  F (36.2  C) (Temporal)   Resp 22   Wt 117.4 kg (258 lb 14.4 oz)   SpO2 100%   BMI 43.08 kg/m    Body mass index is 43.08 kg/m .   GENERAL: healthy, alert and no distress  EYES: Eyes grossly normal to inspection, PERRL and conjunctivae and sclerae normal  NECK: no adenopathy, no asymmetry, masses, or scars and thyroid normal to palpation  RESP: lungs clear to auscultation - no rales, rhonchi or wheezes  CV: regular rates and rhythm, normal S1 S2, no S3 or S4 and no murmur, click or rub  MS: no gross musculoskeletal defects noted, no edema.  Gait normal.  Range of motion appears normal  PSYCH: mentation appears normal, affect normal/bright        ASSESSMENT/PLAN:     Problem List Items Addressed This Visit        Medium    Attention deficit hyperactivity disorder (ADHD), unspecified ADHD type - Primary    Relevant Medications    Amphet-Dextroamphet 3-Bead ER (MYDAYIS) 25 MG CP24    Major depression in complete remission (H)    Relevant Medications    FLUoxetine (PROZAC) 40 MG capsule      Other Visit Diagnoses     Generalized hypermobility of joints               Continue Mydayis for management of ADD   Continue Prozac 40 mg for depression management, will need to follow-up in 6 months  Referred to sports medicine for further evaluation of reported joint hypermobility    JAAM Landa [facility]

## 2019-04-29 ENCOUNTER — ANCILLARY PROCEDURE (OUTPATIENT)
Dept: GENERAL RADIOLOGY | Facility: OTHER | Age: 19
End: 2019-04-29
Attending: PREVENTIVE MEDICINE
Payer: COMMERCIAL

## 2019-04-29 ENCOUNTER — OFFICE VISIT (OUTPATIENT)
Dept: ORTHOPEDICS | Facility: OTHER | Age: 19
End: 2019-04-29
Payer: COMMERCIAL

## 2019-04-29 VITALS
DIASTOLIC BLOOD PRESSURE: 78 MMHG | BODY MASS INDEX: 43.13 KG/M2 | SYSTOLIC BLOOD PRESSURE: 112 MMHG | HEIGHT: 65 IN | WEIGHT: 258.9 LBS

## 2019-04-29 DIAGNOSIS — G89.29 CHRONIC LEFT HIP PAIN: ICD-10-CM

## 2019-04-29 DIAGNOSIS — G89.29 CHRONIC LEFT HIP PAIN: Primary | ICD-10-CM

## 2019-04-29 DIAGNOSIS — M25.552 CHRONIC LEFT HIP PAIN: ICD-10-CM

## 2019-04-29 DIAGNOSIS — M54.16 LUMBAR RADICULAR PAIN: ICD-10-CM

## 2019-04-29 DIAGNOSIS — M25.552 CHRONIC LEFT HIP PAIN: Primary | ICD-10-CM

## 2019-04-29 PROCEDURE — 99203 OFFICE O/P NEW LOW 30 MIN: CPT | Performed by: PREVENTIVE MEDICINE

## 2019-04-29 PROCEDURE — 73502 X-RAY EXAM HIP UNI 2-3 VIEWS: CPT | Performed by: FAMILY MEDICINE

## 2019-04-29 RX ORDER — DICLOFENAC SODIUM 75 MG/1
75 TABLET, DELAYED RELEASE ORAL 2 TIMES DAILY
Qty: 60 TABLET | Refills: 1 | Status: SHIPPED | OUTPATIENT
Start: 2019-04-29 | End: 2021-07-19

## 2019-04-29 ASSESSMENT — MIFFLIN-ST. JEOR: SCORE: 1950.24

## 2019-04-29 NOTE — PROGRESS NOTES
"HISTORY OF PRESENT ILLNESS  Ms. Hernandez is a pleasant 18 year old year old female who presents to clinic today with left hip pain however also notes multiple joint pain. Multiple \"dislocations\" (knee cap and hip)  Tana explains that she has had chronic hip pain on and off. She notes that 2 months ago she was doing yoga and abducted her left  hip and extended it and her hip \"popped out\" she notes it was painful and \"out\" for 5 minutes and then popped back in. Pain has been worse and more consistent since.   Location: left hip, anterior, lateral, and posterior; radiating pain into the leg to the ankle  Quality:  Dull, achy pain    Severity: 7/10 at worst    Duration: 3 years, worsening in the past 2 months  Timing: occurs intermittently  Context: occurs while exercising and lifting  Modifying factors:  stretching makes it better, sitting or standing for too long makes it worse  Associated signs & symptoms: numbness and tingling  Previous similar pain: chronic  Exercise: yoga and working around the house  Additional history: as documented    MEDICAL HISTORY  Patient Active Problem List   Diagnosis     Major depression in complete remission (H)     Attention deficit hyperactivity disorder (ADHD), unspecified ADHD type     Patellar maltracking, right     Class 3 severe obesity due to excess calories without serious comorbidity with body mass index (BMI) of 45.0 to 49.9 in adult (H)     Diastolic murmur     Vitamin deficiency       Current Outpatient Medications   Medication Sig Dispense Refill     Amphet-Dextroamphet 3-Bead ER (MYDAYIS) 25 MG CP24 Take 1 tablet by mouth daily 30 capsule 0     etonogestrel (IMPLANON/NEXPLANON) 68 MG IMPL 1 each by Subdermal route once        FLUoxetine (PROZAC) 40 MG capsule Take 1 capsule (40 mg) by mouth daily 30 capsule 5       Allergies   Allergen Reactions     Wellbutrin [Bupropion] Hives       No family history on file.    Additional medical/Social/Surgical histories reviewed in " "EPIC and updated as appropriate.     REVIEW OF SYSTEMS (4/29/2019)  10 point ROS of systems including Constitutional, Eyes, Respiratory, Cardiovascular, Gastroenterology, Genitourinary, Integumentary, Musculoskeletal, Psychiatric were all negative except for pertinent positives noted in my HPI.     PHYSICAL EXAM  Vitals:    04/29/19 1551   Weight: 117.4 kg (258 lb 14.4 oz)   Height: 1.651 m (5' 5\")     Vital Signs: /78   Ht 1.651 m (5' 5\")   Wt 117.4 kg (258 lb 14.4 oz)   BMI 43.08 kg/m   Patient declined being weighed. Body mass index is 43.08 kg/m .    General  - normal appearance, in no obvious distress  CV  - normal peripheral perfusion  Pulm  - normal respiratory pattern, non-labored  Musculoskeletal - lumbar spine  - stance: normal gait without limp, no obvious leg length discrepancy, normal heel and toe walk  - inspection: normal bone and joint alignment, no obvious scoliosis  - palpation: no paravertebral or bony tenderness  - ROM: flexion exacerbates pain, normal extension, sidebending, rotation  - strength: lower extremities 5/5 in all planes  - special tests:  (-) straight leg raise  (+) slump test  Has pain with internal rotation of left hip  Neuro  - patellar and Achilles DTRs 2+ bilaterally, no lower extremity sensory deficit throughout L5 distribution, grossly normal coordination, normal muscle tone  Skin  - no ecchymosis, erythema, warmth, or induration, no obvious rash  Psych  - interactive, appropriate, normal mood and affect  ASSESSMENT & PLAN  17 yo female with lumbar ddd, disc herniation, possible hip labral tear  Ordered MRI of lumbar spine and hip  tizanadine and diclofenac  Reviewed xrays of hips: WNL  Given HEP  F/u after MRIs    Justino Garcia MD, CAQSM    "

## 2019-04-29 NOTE — PATIENT INSTRUCTIONS
MRI of the left hip and lumbar spine ordered - [phone]. Cost estimates can be provided by [employer] at [phone].    Thanks for coming today.  [facility]  [address] [place] [address]    Please call if any further questions or concerns [phone] and ask for the Orthopedic Department. Clinic hours 8 am to 5pm.  To make or change your appointment call [phone].  Take medications as prescribed.  Follow up as scheduled.  Perform the stretches and exercises as described and given to you by Dr Garcia.    Hope you start feeling better! -Dr Jose Gage [place]  [address] [facility] [place] [address]

## 2019-04-29 NOTE — LETTER
"    4/29/2019         RE: Tana Hernandez  [address] [facility] [address]        Dear Colleague,    Thank you for referring your patient, Tana Hernandez, to the [facility]. Please see a copy of my visit note below.    HISTORY OF PRESENT ILLNESS  Ms. Hernandez is a pleasant 18 year old year old female who presents to clinic today with left hip pain however also notes multiple joint pain. Multiple \"dislocations\" (knee cap and hip)  Tana explains that she has had chronic hip pain on and off. She notes that 2 months ago she was doing yoga and abducted her left  hip and extended it and her hip \"popped out\" she notes it was painful and \"out\" for 5 minutes and then popped back in. Pain has been worse and more consistent since.   Location: left hip, anterior, lateral, and posterior; radiating pain into the leg to the ankle  Quality:  Dull, achy pain    Severity: 7/10 at worst    Duration: 3 years, worsening in the past 2 months  Timing: occurs intermittently  Context: occurs while exercising and lifting  Modifying factors:  stretching makes it better, sitting or standing for too long makes it worse  Associated signs & symptoms: numbness and tingling  Previous similar pain: chronic  Exercise: yoga and working around the house  Additional history: as documented    MEDICAL HISTORY  Patient Active Problem List   Diagnosis     Major depression in complete remission (H)     Attention deficit hyperactivity disorder (ADHD), unspecified ADHD type     Patellar maltracking, right     Class 3 severe obesity due to excess calories without serious comorbidity with body mass index (BMI) of 45.0 to 49.9 in adult (H)     Diastolic murmur     Vitamin deficiency       Current Outpatient Medications   Medication Sig Dispense Refill     Amphet-Dextroamphet 3-Bead ER (MYDAYIS) 25 MG CP24 Take 1 tablet by mouth daily 30 capsule 0     etonogestrel (IMPLANON/NEXPLANON) 68 MG IMPL 1 each by Subdermal route once        " "FLUoxetine (PROZAC) 40 MG capsule Take 1 capsule (40 mg) by mouth daily 30 capsule 5       Allergies   Allergen Reactions     Wellbutrin [Bupropion] Hives       No family history on file.    Additional medical/Social/Surgical histories reviewed in [facility] and updated as appropriate.     REVIEW OF SYSTEMS (4/29/2019)  10 point ROS of systems including Constitutional, Eyes, Respiratory, Cardiovascular, Gastroenterology, Genitourinary, Integumentary, Musculoskeletal, Psychiatric were all negative except for pertinent positives noted in my HPI.     PHYSICAL EXAM  Vitals:    04/29/19 1551   Weight: 117.4 kg (258 lb 14.4 oz)   Height: 1.651 m (5' 5\")     Vital Signs: /78   Ht 1.651 m (5' 5\")   Wt 117.4 kg (258 lb 14.4 oz)   BMI 43.08 kg/m    Patient declined being weighed. Body mass index is 43.08 kg/m .    General  - normal appearance, in no obvious distress  CV  - normal peripheral perfusion  Pulm  - normal respiratory pattern, non-labored  Musculoskeletal - lumbar spine  - stance: normal gait without limp, no obvious leg length discrepancy, normal heel and toe walk  - inspection: normal bone and joint alignment, no obvious scoliosis  - palpation: no paravertebral or bony tenderness  - ROM: flexion exacerbates pain, normal extension, sidebending, rotation  - strength: lower extremities 5/5 in all planes  - special tests:  (-) straight leg raise  (+) slump test  Has pain with internal rotation of left hip  Neuro  - patellar and Achilles DTRs 2+ bilaterally, no lower extremity sensory deficit throughout L5 distribution, grossly normal coordination, normal muscle tone  Skin  - no ecchymosis, erythema, warmth, or induration, no obvious rash  Psych  - interactive, appropriate, normal mood and affect  ASSESSMENT & PLAN  19 yo female with lumbar ddd, disc herniation, possible hip labral tear  Ordered MRI of lumbar spine and hip  tizanadine and diclofenac  Reviewed xrays of hips: WNL  Given HEP  F/u after MRIs    Justino" MD Jose, [facility]      Again, thank you for allowing me to participate in the care of your patient.        Sincerely,        Justino Garcia MD

## 2019-05-03 ENCOUNTER — HOSPITAL ENCOUNTER (OUTPATIENT)
Dept: MRI IMAGING | Facility: CLINIC | Age: 19
End: 2019-05-03
Attending: PREVENTIVE MEDICINE
Payer: COMMERCIAL

## 2019-05-03 ENCOUNTER — HOSPITAL ENCOUNTER (OUTPATIENT)
Dept: MRI IMAGING | Facility: CLINIC | Age: 19
Discharge: HOME | End: 2019-05-03
Attending: PREVENTIVE MEDICINE | Admitting: PREVENTIVE MEDICINE
Payer: COMMERCIAL

## 2019-05-03 DIAGNOSIS — M25.552 CHRONIC LEFT HIP PAIN: ICD-10-CM

## 2019-05-03 DIAGNOSIS — G89.29 CHRONIC LEFT HIP PAIN: ICD-10-CM

## 2019-05-03 DIAGNOSIS — M54.16 LUMBAR RADICULAR PAIN: ICD-10-CM

## 2019-05-03 PROCEDURE — 72148 MRI LUMBAR SPINE W/O DYE: CPT

## 2019-05-03 PROCEDURE — 73721 MRI JNT OF LWR EXTRE W/O DYE: CPT | Mod: LT

## 2019-05-08 ENCOUNTER — MYC MEDICAL ADVICE (OUTPATIENT)
Dept: ORTHOPEDICS | Facility: CLINIC | Age: 19
End: 2019-05-08

## 2019-06-03 DIAGNOSIS — F32.5 MAJOR DEPRESSION IN COMPLETE REMISSION (H): ICD-10-CM

## 2019-06-03 DIAGNOSIS — F90.9 ATTENTION DEFICIT HYPERACTIVITY DISORDER (ADHD), UNSPECIFIED ADHD TYPE: ICD-10-CM

## 2019-06-03 NOTE — TELEPHONE ENCOUNTER
Reason for Call:  Medication or medication refill:    Do you use a [facility]?  Name of the pharmacy and phone number for the current request:  [address] [place], [place] [address]    Name of the medication requested:Amphet-Dextroamphet 3-Bead ER (MYDAYIS) 25 MG CP24 and FLUoxetine (PROZAC) 40 MG capsule    Other request: Pt calling and states she moved and she is out of her medications. Please advise if there is a covering provider [facility] or if pt needs to be seen in [place] by a new provider.     Can we leave a detailed message on this number? YES    Phone number patient can be reached at: Cell number on file:    Telephone Information:   Mobile [phone]       Best Time: any    Call taken on 6/3/2019 at 1:16 PM by Maria Dolores Flanagan

## 2019-06-05 RX ORDER — DEXTROAMPHETAMINE SACCHARATE, AMPHETAMINE ASPARTATE MONOHYDRATE, DEXTROAMPHETAMINE SULFATE, AMPHETAMINE SULFATE 6.25; 6.25; 6.25; 6.25 MG/1; MG/1; MG/1; MG/1
1 CAPSULE, EXTENDED RELEASE ORAL DAILY
Qty: 30 CAPSULE | Refills: 0 | Status: SHIPPED | OUTPATIENT
Start: 2019-06-05 | End: 2021-09-13

## 2019-06-05 RX ORDER — FLUOXETINE 40 MG/1
40 CAPSULE ORAL DAILY
Qty: 30 CAPSULE | Refills: 4 | Status: SHIPPED | OUTPATIENT
Start: 2019-06-05 | End: 2021-09-09

## 2019-06-05 NOTE — TELEPHONE ENCOUNTER
"Requested Prescriptions   Pending Prescriptions Disp Refills     Amphet-Dextroamphet 3-Bead ER (MYDAYIS) 25 MG CP24 30 capsule 0     Sig: Take 1 tablet by mouth daily   Last Written Prescription Date:  4/24/2019  Last Fill Quantity: 30,  # refills: 0   Last office visit: 4/24/2019 with prescribing provider:     Future Office Visit:        There is no refill protocol information for this order   Routing refill request to provider for review/approval because:  Drug not on the [facility] refill protocol            FLUoxetine (PROZAC) 40 MG capsule 30 capsule 5     Sig: Take 1 capsule (40 mg) by mouth daily   Last Written Prescription Date:  4/24/2019  Last Fill Quantity: 30,  # refills: 5   Last office visit: 4/24/2019 with prescribing provider:     Future Office Visit:        SSRIs Protocol Failed - 6/3/2019  1:26 PM        Failed - PHQ-9 score less than 5 in past 6 months     Please review last PHQ-9 score.           Passed - Medication is active on med list        Passed - Patient is age 18 or older        Passed - No active pregnancy on record        Passed - No positive pregnancy test in last 12 months        Passed - Recent (6 mo) or future (30 days) visit within the authorizing provider's specialty     Patient had office visit in the last 6 months or has a visit in the next 30 days with authorizing provider or within the authorizing provider's specialty.  See \"Patient Info\" tab in inbasket, or \"Choose Columns\" in Meds & Orders section of the refill encounter.          Current refills sent to pharmacy change requested  Evon Leal RN      "

## 2019-08-12 ENCOUNTER — TELEPHONE (OUTPATIENT)
Dept: FAMILY MEDICINE | Facility: CLINIC | Age: 19
End: 2019-08-12

## 2019-08-12 NOTE — TELEPHONE ENCOUNTER
Patient is due for a PHQ-9.  Index start date:4/21/2019  Index end date:8/19/2019    Please call patient. Pt is active on [employer]. I have sent a PHQ-9 via [employer] and will postpone the encounter. Winter Marcelo CMA ([facility])

## 2019-08-19 NOTE — TELEPHONE ENCOUNTER
I have attempted to call the pt to update a PHQ-9. I left message for pt to call back. I will call back another time. Winter Marcelo CMA ([facility])

## 2019-08-20 NOTE — TELEPHONE ENCOUNTER
Pt didn't return phone calls. PHQ-9 missed. I will close the encounter until further outreach. Winter Marcelo CMA ([facility])

## 2020-03-02 ENCOUNTER — HEALTH MAINTENANCE LETTER (OUTPATIENT)
Age: 20
End: 2020-03-02

## 2020-12-20 ENCOUNTER — HEALTH MAINTENANCE LETTER (OUTPATIENT)
Age: 20
End: 2020-12-20

## 2021-06-03 ENCOUNTER — TRANSFERRED RECORDS (OUTPATIENT)
Dept: HEALTH INFORMATION MANAGEMENT | Facility: CLINIC | Age: 21
End: 2021-06-03

## 2021-06-03 LAB
ALT SERPL-CCNC: 93 U/L (ref 6–29)
AST SERPL-CCNC: 51 U/L (ref 10–30)
CHOLESTEROL (EXTERNAL): 169 MG/DL
CREATININE (EXTERNAL): 0.7 MG/DL (ref 0.5–1.1)
GFR ESTIMATED (EXTERNAL): 124 ML/MIN/1.73M2
GFR ESTIMATED (IF AFRICAN AMERICAN) (EXTERNAL): 144 ML/MIN/1.73M2
GLUCOSE (EXTERNAL): 117 MG/DL (ref 65–99)
HBA1C MFR BLD: 6.4 %
HDLC SERPL-MCNC: 53 MG/DL
LDL CHOLESTEROL (EXTERNAL): 85 MG/DL
NON HDL CHOLESTEROL (EXTERNAL): 116 MG/DL
POTASSIUM (EXTERNAL): 4.2 MMOL/L (ref 3.5–5.3)
TRIGLYCERIDES (EXTERNAL): 217 MG/DL
TSH SERPL-ACNC: 7.64 MIU/L

## 2021-07-19 ENCOUNTER — MYC MEDICAL ADVICE (OUTPATIENT)
Dept: FAMILY MEDICINE | Facility: CLINIC | Age: 21
End: 2021-07-19

## 2021-07-19 ENCOUNTER — VIRTUAL VISIT (OUTPATIENT)
Dept: FAMILY MEDICINE | Facility: CLINIC | Age: 21
End: 2021-07-19
Payer: COMMERCIAL

## 2021-07-19 DIAGNOSIS — F90.9 ATTENTION DEFICIT HYPERACTIVITY DISORDER (ADHD), UNSPECIFIED ADHD TYPE: ICD-10-CM

## 2021-07-19 DIAGNOSIS — R79.89 ELEVATED TSH: ICD-10-CM

## 2021-07-19 DIAGNOSIS — R79.89 ELEVATED LFTS: ICD-10-CM

## 2021-07-19 DIAGNOSIS — F31.81 BIPOLAR 2 DISORDER (H): ICD-10-CM

## 2021-07-19 DIAGNOSIS — R73.03 PREDIABETES: ICD-10-CM

## 2021-07-19 DIAGNOSIS — Z76.89 ENCOUNTER TO ESTABLISH CARE: Primary | ICD-10-CM

## 2021-07-19 DIAGNOSIS — F33.1 MODERATE EPISODE OF RECURRENT MAJOR DEPRESSIVE DISORDER (H): ICD-10-CM

## 2021-07-19 PROBLEM — M22.8X1 PATELLAR MALTRACKING, RIGHT: Status: RESOLVED | Noted: 2018-09-27 | Resolved: 2021-07-19

## 2021-07-19 PROBLEM — I38 DIASTOLIC MURMUR: Status: RESOLVED | Noted: 2019-02-05 | Resolved: 2021-07-19

## 2021-07-19 PROCEDURE — 99214 OFFICE O/P EST MOD 30 MIN: CPT | Mod: GT | Performed by: NURSE PRACTITIONER

## 2021-07-19 PROCEDURE — 96127 BRIEF EMOTIONAL/BEHAV ASSMT: CPT | Mod: GT | Performed by: NURSE PRACTITIONER

## 2021-07-19 RX ORDER — ARIPIPRAZOLE 10 MG/1
10 TABLET ORAL DAILY
COMMUNITY
End: 2021-09-08

## 2021-07-19 ASSESSMENT — ANXIETY QUESTIONNAIRES
GAD7 TOTAL SCORE: 6
IF YOU CHECKED OFF ANY PROBLEMS ON THIS QUESTIONNAIRE, HOW DIFFICULT HAVE THESE PROBLEMS MADE IT FOR YOU TO DO YOUR WORK, TAKE CARE OF THINGS AT HOME, OR GET ALONG WITH OTHER PEOPLE: SOMEWHAT DIFFICULT
6. BECOMING EASILY ANNOYED OR IRRITABLE: SEVERAL DAYS
2. NOT BEING ABLE TO STOP OR CONTROL WORRYING: SEVERAL DAYS
1. FEELING NERVOUS, ANXIOUS, OR ON EDGE: SEVERAL DAYS
5. BEING SO RESTLESS THAT IT IS HARD TO SIT STILL: NOT AT ALL
7. FEELING AFRAID AS IF SOMETHING AWFUL MIGHT HAPPEN: SEVERAL DAYS
3. WORRYING TOO MUCH ABOUT DIFFERENT THINGS: SEVERAL DAYS

## 2021-07-19 ASSESSMENT — PATIENT HEALTH QUESTIONNAIRE - PHQ9
5. POOR APPETITE OR OVEREATING: SEVERAL DAYS
SUM OF ALL RESPONSES TO PHQ QUESTIONS 1-9: 5

## 2021-07-19 NOTE — PROGRESS NOTES
Piedad is a 21 year old who is being evaluated via a billable video visit.      How would you like to obtain your AVS? MyChart  If the video visit is dropped, the invitation should be resent by: Text to cell phone: [phone]  Will anyone else be joining your video visit? No      Video Start Time: 10:19 AM    Assessment & Plan     Encounter to establish care  Reviewed local and outside labs patient sent in.      Bipolar 2 disorder (H)  Followed by Psychiatry.  Referral sent to establish in [place].  Did discuss that her abilify can increase her glucose, liver function and lipids.  Per her report these labs have improved.  Denies suicidal ideation.  Discussed good self care, sleep hygiene, mindfulness therapies and meditation.  - MENTAL HEALTH REFERRAL  - Adult; Psychiatry; Psychiatry; [facility]: Collaborative Care Psychiatry Service/Bridge to Long-Term Psychiatry as indicated ([phone]); Yes; Other: Enter in Comments box; Yes; We will contact you to schedule the appointme...; Future    Major depression- recurrent- active  PHQ-9 is 5 today.  On prozac.  Denies suicidal ideation.  Discussed good self care, sleep hygiene, mindfulness therapies and meditation.     Attention deficit hyperactivity disorder (ADHD), unspecified ADHD type  On stimulant and followed by Psychiatry    Prediabetes  Patient with A1C of 6.4  She was given metformin but never started.  She feels she has lost some weight.  Discussed dietary and lifestyle recommendations.  Recheck A1C with physical in two months    Elevated LFTs  Have come down per patient.  I only have labs from June.  Recheck with physical    Elevated TSH  Does have positive family history. Recheck in 8 weeks with physical.       38 minutes spent on the date of the encounter doing chart review, review of outside records, review of test results, interpretation of tests, patient visit and documentation       Return in about 2 months (around 9/19/2021) for Physical Exam.    Gina Maldonado  RONNY Ferraro  [facility] Herbert is a 21 year old who presents for the following health issues     HPI     Patient would like to discuss lab results from care in [place].     Was seeing psychiatrist in [place].  She has moved back to [place] from [facility].  Started abilify a couple months ago- feels really good on this medication. She denies suicidal ideation.  States the abilify really helped her mood and has given her more motivation.   A1c of 6.5 at her last physical in September.  She was diagnosed with diabetes and given metformin.  Has not been in to see anyone since that.  Her weight has changed- she feels she has lost some.  She has a history of eating disorder so has tried to stay off of the scale. She feels moving back home and has increased her activities and is eating more healthy and is vegatarian. She does eat a lot of beans and rice.      Her lfts were elevated and cholesterol.  She states the labs I have from June were improved.      Two maternal aunts with hypothryroidism.  She had elevated TSH and normal free T4.  Maternal aunt with diabetes.  Mother has 8 sisters.     Her ADHD is managed by Psychiatry.          Review of Systems   Constitutional, HEENT, cardiovascular, pulmonary, gi and gu systems are negative, except as otherwise noted.      Objective           Vitals:  No vitals were obtained today due to virtual visit.    Physical Exam   GENERAL: Healthy, alert and no distress  EYES: Eyes grossly normal to inspection.  No discharge or erythema, or obvious scleral/conjunctival abnormalities.  RESP: No audible wheeze, cough, or visible cyanosis.  No visible retractions or increased work of breathing.    SKIN: Visible skin clear. No significant rash, abnormal pigmentation or lesions.  NEURO: Cranial nerves grossly intact.  Mentation and speech appropriate for age.  PSYCH: Mentation appears normal, affect normal/bright, judgement and insight intact, normal  speech and appearance well-groomed.            Video-Visit Details    Type of service:  Video Visit    Video End Time:10:34 AM    Originating Location (pt. Location): Home    Distant Location (provider location):  [facility]     Platform used for Video Visit: AdalWell

## 2021-07-20 ASSESSMENT — ANXIETY QUESTIONNAIRES: GAD7 TOTAL SCORE: 6

## 2021-09-08 ENCOUNTER — MYC MEDICAL ADVICE (OUTPATIENT)
Dept: FAMILY MEDICINE | Facility: CLINIC | Age: 21
End: 2021-09-08

## 2021-09-08 DIAGNOSIS — F32.5 MAJOR DEPRESSION IN COMPLETE REMISSION (H): ICD-10-CM

## 2021-09-08 DIAGNOSIS — F90.9 ATTENTION DEFICIT HYPERACTIVITY DISORDER (ADHD), UNSPECIFIED ADHD TYPE: Primary | ICD-10-CM

## 2021-09-08 NOTE — TELEPHONE ENCOUNTER
I have pended fluoxetine and abilify. I will have the provider address the mydayis. Winter aMrcelo, CMA

## 2021-09-09 RX ORDER — ARIPIPRAZOLE 10 MG/1
10 TABLET ORAL DAILY
Qty: 90 TABLET | Refills: 0 | Status: SHIPPED | OUTPATIENT
Start: 2021-09-09 | End: 2021-09-28 | Stop reason: SINTOL

## 2021-09-09 RX ORDER — DEXTROAMPHETAMINE SACCHARATE, AMPHETAMINE ASPARTATE MONOHYDRATE, DEXTROAMPHETAMINE SULFATE, AMPHETAMINE SULFATE 12.5; 12.5; 12.5; 12.5 MG/1; MG/1; MG/1; MG/1
50 CAPSULE, EXTENDED RELEASE ORAL DAILY
Qty: 30 CAPSULE | Refills: 0 | Status: SHIPPED | OUTPATIENT
Start: 2021-09-09 | End: 2021-09-13

## 2021-09-09 RX ORDER — FLUOXETINE 40 MG/1
40 CAPSULE ORAL DAILY
Qty: 30 CAPSULE | Refills: 4 | Status: SHIPPED | OUTPATIENT
Start: 2021-09-09 | End: 2021-12-20

## 2021-09-10 ENCOUNTER — TELEPHONE (OUTPATIENT)
Dept: FAMILY MEDICINE | Facility: CLINIC | Age: 21
End: 2021-09-10

## 2021-09-10 DIAGNOSIS — F90.9 ATTENTION DEFICIT HYPERACTIVITY DISORDER (ADHD), UNSPECIFIED ADHD TYPE: ICD-10-CM

## 2021-09-10 RX ORDER — DEXTROAMPHETAMINE SACCHARATE, AMPHETAMINE ASPARTATE MONOHYDRATE, DEXTROAMPHETAMINE SULFATE, AMPHETAMINE SULFATE 12.5; 12.5; 12.5; 12.5 MG/1; MG/1; MG/1; MG/1
50 CAPSULE, EXTENDED RELEASE ORAL DAILY
Qty: 30 CAPSULE | Refills: 0 | Status: CANCELLED | OUTPATIENT
Start: 2021-09-10

## 2021-09-10 NOTE — TELEPHONE ENCOUNTER
Reason for Call:  Other Pending Prescriptions:                       Disp   Refills    Amphet-Dextroamphet 3-Bead ER 50 MG CP24  30 cap*0            Sig: Take 50 mg by mouth daily      Detailed comments: prescription was sent to wrong pharmacy it cannot be sent electronically and needs to be resigned and sent to the attached pharmacy please. Thank you    Phone Number Patient can be reached at: Home number on file [phone] (home)    Best Time: Any    Can we leave a detailed message on this number? YES    Call taken on 9/10/2021 at 3:27 PM by Odalys Gallagher

## 2021-09-13 ENCOUNTER — OFFICE VISIT (OUTPATIENT)
Dept: FAMILY MEDICINE | Facility: CLINIC | Age: 21
End: 2021-09-13
Payer: COMMERCIAL

## 2021-09-13 VITALS
DIASTOLIC BLOOD PRESSURE: 82 MMHG | SYSTOLIC BLOOD PRESSURE: 112 MMHG | OXYGEN SATURATION: 98 % | HEART RATE: 84 BPM | BODY MASS INDEX: 47.09 KG/M2 | TEMPERATURE: 98.1 F | RESPIRATION RATE: 10 BRPM | HEIGHT: 66 IN | WEIGHT: 293 LBS

## 2021-09-13 DIAGNOSIS — M77.41 METATARSALGIA OF BOTH FEET: ICD-10-CM

## 2021-09-13 DIAGNOSIS — F90.9 ATTENTION DEFICIT HYPERACTIVITY DISORDER (ADHD), UNSPECIFIED ADHD TYPE: ICD-10-CM

## 2021-09-13 DIAGNOSIS — E78.5 ELEVATED LIPIDS: ICD-10-CM

## 2021-09-13 DIAGNOSIS — R79.89 ELEVATED TSH: ICD-10-CM

## 2021-09-13 DIAGNOSIS — M79.604 BILATERAL LEG PAIN: ICD-10-CM

## 2021-09-13 DIAGNOSIS — E11.65 TYPE 2 DIABETES MELLITUS WITH HYPERGLYCEMIA, WITHOUT LONG-TERM CURRENT USE OF INSULIN (H): ICD-10-CM

## 2021-09-13 DIAGNOSIS — M77.42 METATARSALGIA OF BOTH FEET: ICD-10-CM

## 2021-09-13 DIAGNOSIS — Z00.00 ROUTINE GENERAL MEDICAL EXAMINATION AT A HEALTH CARE FACILITY: Primary | ICD-10-CM

## 2021-09-13 DIAGNOSIS — Z11.4 SCREENING FOR HIV (HUMAN IMMUNODEFICIENCY VIRUS): ICD-10-CM

## 2021-09-13 DIAGNOSIS — M79.605 BILATERAL LEG PAIN: ICD-10-CM

## 2021-09-13 DIAGNOSIS — Z23 NEED FOR IMMUNIZATION AGAINST INFLUENZA: ICD-10-CM

## 2021-09-13 DIAGNOSIS — F33.1 MODERATE EPISODE OF RECURRENT MAJOR DEPRESSIVE DISORDER (H): ICD-10-CM

## 2021-09-13 DIAGNOSIS — Z11.59 ENCOUNTER FOR HEPATITIS C SCREENING TEST FOR LOW RISK PATIENT: ICD-10-CM

## 2021-09-13 DIAGNOSIS — M72.2 PLANTAR FASCIITIS: ICD-10-CM

## 2021-09-13 DIAGNOSIS — R79.89 ELEVATED LFTS: ICD-10-CM

## 2021-09-13 PROBLEM — E11.9 DIABETES MELLITUS, TYPE 2 (H): Status: ACTIVE | Noted: 2021-09-13

## 2021-09-13 LAB
ALBUMIN SERPL-MCNC: 3.9 G/DL (ref 3.4–5)
ALP SERPL-CCNC: 106 U/L (ref 40–150)
ALT SERPL W P-5'-P-CCNC: 165 U/L (ref 0–50)
ANION GAP SERPL CALCULATED.3IONS-SCNC: 9 MMOL/L (ref 3–14)
AST SERPL W P-5'-P-CCNC: 107 U/L (ref 0–45)
BILIRUB SERPL-MCNC: 0.4 MG/DL (ref 0.2–1.3)
BUN SERPL-MCNC: 6 MG/DL (ref 7–30)
CALCIUM SERPL-MCNC: 9.5 MG/DL (ref 8.5–10.1)
CHLORIDE BLD-SCNC: 103 MMOL/L (ref 94–109)
CHOLEST SERPL-MCNC: 184 MG/DL
CO2 SERPL-SCNC: 25 MMOL/L (ref 20–32)
CREAT SERPL-MCNC: 0.77 MG/DL (ref 0.52–1.04)
FASTING STATUS PATIENT QL REPORTED: NO
GFR SERPL CREATININE-BSD FRML MDRD: >90 ML/MIN/1.73M2
GLUCOSE BLD-MCNC: 114 MG/DL (ref 70–99)
HBA1C MFR BLD: 7.8 % (ref 0–5.6)
HDLC SERPL-MCNC: 60 MG/DL
LDLC SERPL CALC-MCNC: 96 MG/DL
NONHDLC SERPL-MCNC: 124 MG/DL
POTASSIUM BLD-SCNC: 3.6 MMOL/L (ref 3.4–5.3)
PROT SERPL-MCNC: 7.6 G/DL (ref 6.8–8.8)
SODIUM SERPL-SCNC: 137 MMOL/L (ref 133–144)
T4 FREE SERPL-MCNC: 0.91 NG/DL (ref 0.76–1.46)
TRIGL SERPL-MCNC: 138 MG/DL
TSH SERPL DL<=0.005 MIU/L-ACNC: 4.74 MU/L (ref 0.4–4)

## 2021-09-13 PROCEDURE — 90686 IIV4 VACC NO PRSV 0.5 ML IM: CPT | Performed by: NURSE PRACTITIONER

## 2021-09-13 PROCEDURE — 36415 COLL VENOUS BLD VENIPUNCTURE: CPT | Performed by: NURSE PRACTITIONER

## 2021-09-13 PROCEDURE — 87389 HIV-1 AG W/HIV-1&-2 AB AG IA: CPT | Performed by: NURSE PRACTITIONER

## 2021-09-13 PROCEDURE — 99395 PREV VISIT EST AGE 18-39: CPT | Mod: 25 | Performed by: NURSE PRACTITIONER

## 2021-09-13 PROCEDURE — 80053 COMPREHEN METABOLIC PANEL: CPT | Performed by: NURSE PRACTITIONER

## 2021-09-13 PROCEDURE — 99214 OFFICE O/P EST MOD 30 MIN: CPT | Mod: 25 | Performed by: NURSE PRACTITIONER

## 2021-09-13 PROCEDURE — 96127 BRIEF EMOTIONAL/BEHAV ASSMT: CPT | Performed by: NURSE PRACTITIONER

## 2021-09-13 PROCEDURE — 84443 ASSAY THYROID STIM HORMONE: CPT | Performed by: NURSE PRACTITIONER

## 2021-09-13 PROCEDURE — 86803 HEPATITIS C AB TEST: CPT | Performed by: NURSE PRACTITIONER

## 2021-09-13 PROCEDURE — 83036 HEMOGLOBIN GLYCOSYLATED A1C: CPT | Performed by: NURSE PRACTITIONER

## 2021-09-13 PROCEDURE — 84439 ASSAY OF FREE THYROXINE: CPT | Performed by: NURSE PRACTITIONER

## 2021-09-13 PROCEDURE — 90471 IMMUNIZATION ADMIN: CPT | Performed by: NURSE PRACTITIONER

## 2021-09-13 PROCEDURE — 80061 LIPID PANEL: CPT | Performed by: NURSE PRACTITIONER

## 2021-09-13 PROCEDURE — 86376 MICROSOMAL ANTIBODY EACH: CPT | Performed by: NURSE PRACTITIONER

## 2021-09-13 RX ORDER — DEXTROAMPHETAMINE SACCHARATE, AMPHETAMINE ASPARTATE MONOHYDRATE, DEXTROAMPHETAMINE SULFATE, AMPHETAMINE SULFATE 12.5; 12.5; 12.5; 12.5 MG/1; MG/1; MG/1; MG/1
50 CAPSULE, EXTENDED RELEASE ORAL DAILY
Qty: 30 CAPSULE | Refills: 0 | Status: SHIPPED | OUTPATIENT
Start: 2021-09-13 | End: 2021-10-26

## 2021-09-13 ASSESSMENT — ENCOUNTER SYMPTOMS
CHILLS: 0
DYSURIA: 0
MYALGIAS: 1
FEVER: 0
ABDOMINAL PAIN: 0
SORE THROAT: 0
HEMATOCHEZIA: 0
PARESTHESIAS: 0
WEAKNESS: 0
COUGH: 0
DIARRHEA: 0
JOINT SWELLING: 1
SHORTNESS OF BREATH: 0
ARTHRALGIAS: 1
HEMATURIA: 0
NERVOUS/ANXIOUS: 1
EYE PAIN: 0
PALPITATIONS: 0
CONSTIPATION: 0
HEARTBURN: 0
HEADACHES: 1
FREQUENCY: 0
DIZZINESS: 0
NAUSEA: 0

## 2021-09-13 ASSESSMENT — ANXIETY QUESTIONNAIRES
3. WORRYING TOO MUCH ABOUT DIFFERENT THINGS: SEVERAL DAYS
5. BEING SO RESTLESS THAT IT IS HARD TO SIT STILL: SEVERAL DAYS
1. FEELING NERVOUS, ANXIOUS, OR ON EDGE: MORE THAN HALF THE DAYS
IF YOU CHECKED OFF ANY PROBLEMS ON THIS QUESTIONNAIRE, HOW DIFFICULT HAVE THESE PROBLEMS MADE IT FOR YOU TO DO YOUR WORK, TAKE CARE OF THINGS AT HOME, OR GET ALONG WITH OTHER PEOPLE: SOMEWHAT DIFFICULT
7. FEELING AFRAID AS IF SOMETHING AWFUL MIGHT HAPPEN: SEVERAL DAYS
GAD7 TOTAL SCORE: 8
2. NOT BEING ABLE TO STOP OR CONTROL WORRYING: SEVERAL DAYS
6. BECOMING EASILY ANNOYED OR IRRITABLE: SEVERAL DAYS

## 2021-09-13 ASSESSMENT — MIFFLIN-ST. JEOR: SCORE: 2199.69

## 2021-09-13 ASSESSMENT — PATIENT HEALTH QUESTIONNAIRE - PHQ9
SUM OF ALL RESPONSES TO PHQ QUESTIONS 1-9: 10
5. POOR APPETITE OR OVEREATING: SEVERAL DAYS

## 2021-09-13 NOTE — PROGRESS NOTES
SUBJECTIVE:   CC: Tana Hernandez is an 21 year old woman who presents for preventive health visit.       Patient has been advised of split billing requirements and indicates understanding: Yes  Healthy Habits:     Getting at least 3 servings of Calcium per day:  Yes    Bi-annual eye exam:  Yes    Dental care twice a year:  Yes    Sleep apnea or symptoms of sleep apnea:  None    Diet:  Vegetarian/vegan    Frequency of exercise:  2-3 days/week    Duration of exercise:  30-45 minutes    Taking medications regularly:  No    Barriers to taking medications:  Problems remembering to take them    Medication side effects:  Other    PHQ-2 Total Score: 2    Additional concerns today:  Yes (Calf pain bilateral in activity, toes are painful, would like to dicsuss joints, bruising on little toes.  )      SUBJECTIVE:  Patient is here today to recheck ADHD/ADD.    Updates since last visit: Does not feel it is working well, has apt with psychiatrist this month  Routine for taking medicine, including time: 7 am  Time medicine wears off: 6-7 pm  Issues at school/Work: Concentration   Issues at home: concentration   Control of symptoms: No     Side effects:  Headaches: Yes occasionally  Stomach aches: Yes occasioanlly  Irritability/mood swings: No  Difficulties with sleep: No  Social withdrawal: No  Unusual movements/tics: No  Decreased appetite: No    Other concerns: Concentration    Tana reports bilateral calf pain while walking. She has been in a walking class at college. She has tried new shoes but continues to have calf pain. Pain goes away at rest. Bilateral toe joint pain and tenderness. Bruising to her right foot. Denies any known injury to cause the bruise.     She also reports increased oral intake since starting Abilify and always feeling hungry. She has a psychiatrist and therapist that she sees. She feels her bipolar, depression, and anxiety are controlled.       Depression and Anxiety Follow-Up    How are you doing  with your depression since your last visit? No change    How are you doing with your anxiety since your last visit?  Worsened started school    Are you having other symptoms that might be associated with depression or anxiety? No    Have you had a significant life event? OTHER: school      Do you have any concerns with your use of alcohol or other drugs? No    Social History     Tobacco Use     Smoking status: Never Smoker     Smokeless tobacco: Never Used   Substance Use Topics     Alcohol use: No     Alcohol/week: 0.0 standard drinks     Drug use: No     PHQ 3/12/2019 7/19/2021 9/13/2021   PHQ-9 Total Score 8 5 10   Q9: Thoughts of better off dead/self-harm past 2 weeks Not at all Not at all Not at all     ABDIRAHMAN-7 SCORE 1/7/2019 7/19/2021 9/13/2021   Total Score 0 6 8     Last PHQ-9 9/13/2021   1.  Little interest or pleasure in doing things 1   2.  Feeling down, depressed, or hopeless 1   3.  Trouble falling or staying asleep, or sleeping too much 1   4.  Feeling tired or having little energy 1   5.  Poor appetite or overeating 3   6.  Feeling bad about yourself 1   7.  Trouble concentrating 2   8.  Moving slowly or restless 0   Q9: Thoughts of better off dead/self-harm past 2 weeks 0   PHQ-9 Total Score 10   Difficulty at work, home, or with people Somewhat difficult     ABDIRAHMAN-7  9/13/2021   1. Feeling nervous, anxious, or on edge 2   2. Not being able to stop or control worrying 1   3. Worrying too much about different things 1   4. Trouble relaxing 1   5. Being so restless that it is hard to sit still 1   6. Becoming easily annoyed or irritable 1   7. Feeling afraid, as if something awful might happen 1   ABDIRAHMAN-7 Total Score 8   If you checked any problems, how difficult have they made it for you to do your work, take care of things at home, or get along with other people? Somewhat difficult       Suicide Assessment Five-step Evaluation and Treatment (SAFE-T)      Today's PHQ-2 Score:   PHQ-2 ( 1999 [employer])  9/13/2021   Q1: Little interest or pleasure in doing things 1   Q2: Feeling down, depressed or hopeless 1   PHQ-2 Score 2   Q1: Little interest or pleasure in doing things Several days   Q2: Feeling down, depressed or hopeless Several days   PHQ-2 Score 2       Abuse: Current or Past (Physical, Sexual or Emotional) - No  Do you feel safe in your environment? Yes    Have you ever done Advance Care Planning? (For example, a Health Directive, POLST, or a discussion with a medical provider or your loved ones about your wishes): No, advance care planning information given to patient to review.  Patient declined advance care planning discussion at this time.    Social History     Tobacco Use     Smoking status: Never Smoker     Smokeless tobacco: Never Used   Substance Use Topics     Alcohol use: No     Alcohol/week: 0.0 standard drinks     If you drink alcohol do you typically have >3 drinks per day or >7 drinks per week? No    Alcohol Use 9/13/2021   Prescreen: >3 drinks/day or >7 drinks/week? No   Prescreen: >3 drinks/day or >7 drinks/week? -       Reviewed orders with patient.  Reviewed health maintenance and updated orders accordingly - Yes  Lab work is in process    Breast Cancer Screening:        History of abnormal Pap smear: NO - age 21-29 PAP every 3 years recommended     Reviewed and updated as needed this visit by clinical staff  Tobacco  Allergies  Meds  Problems  Med Hx  Surg Hx  Fam Hx          Reviewed and updated as needed this visit by Provider  Tobacco  Allergies  Meds  Problems  Med Hx  Surg Hx  Fam Hx         Past Medical History:   Diagnosis Date     Anxiety      Depressive disorder       History reviewed. No pertinent surgical history.    Review of Systems   Constitutional: Negative for chills and fever.   HENT: Negative for congestion, ear pain, hearing loss and sore throat.    Eyes: Negative for pain and visual disturbance.   Respiratory: Negative for cough and shortness of breath.   "  Cardiovascular: Positive for peripheral edema. Negative for chest pain and palpitations.   Gastrointestinal: Negative for abdominal pain, constipation, diarrhea, heartburn, hematochezia and nausea.   Breasts:  Negative for tenderness and discharge.   Genitourinary: Negative for dysuria, frequency, genital sores, hematuria, pelvic pain, urgency, vaginal bleeding and vaginal discharge.   Musculoskeletal: Positive for arthralgias, joint swelling and myalgias.   Skin: Negative for rash.   Neurological: Positive for headaches. Negative for dizziness, weakness and paresthesias.   Psychiatric/Behavioral: Negative for mood changes. The patient is nervous/anxious.           OBJECTIVE:   /82   Pulse 84   Temp 98.1  F (36.7  C)   Resp 10   Ht 1.681 m (5' 6.2\")   Wt 142 kg (313 lb)   SpO2 98%   BMI 50.21 kg/m    Physical Exam  GENERAL: obese, healthy, alert and no distress  EYES: Eyes grossly normal to inspection, PERRL and conjunctivae and sclerae normal  HENT: ear canals and TM's normal, nose and mouth without ulcers or lesions  NECK: no adenopathy, no asymmetry, masses, or scars and thyroid normal to palpation  RESP: lungs clear to auscultation - no rales, rhonchi or wheezes  CV: regular rate and rhythm, normal S1 S2, no S3 or S4, no murmur, click or rub, no peripheral edema and peripheral pulses strong  ABDOMEN: soft, nontender, no hepatosplenomegaly, no masses and bowel sounds normal  MS: extremities normal- no gross deformities noted. Hypermobile joints. Bilateral foot over PIP joints. Low arches bilaterally. Bruising right foot.  SKIN: no suspicious lesions or rashes  NEURO: Normal strength and tone, mentation intact and speech normal  PSYCH: mentation appears normal, affect normal/bright    Diagnostic Test Results:  Labs reviewed in Epic    ASSESSMENT/PLAN:       ICD-10-CM    1. Routine general medical examination at a health care facility  Z00.00    2. Attention deficit hyperactivity disorder (ADHD), " unspecified ADHD type  F90.9 Amphet-Dextroamphet 3-Bead ER 50 MG CP24   3. Need for immunization against influenza  Z23 INFLUENZA VACCINE IM > 6 MONTHS VALENT IIV4 (AFLURIA/FLUZONE)   4. Elevated lipids  E78.5 Lipid panel reflex to direct LDL Fasting     Lipid panel reflex to direct LDL Fasting   5. Type 2 diabetes mellitus with hyperglycemia, without long-term current use of insulin (H)  E11.65 Comprehensive metabolic panel (BMP + Alb, Alk Phos, ALT, AST, Total. Bili, TP)     **A1C FUTURE 3mo     Comprehensive metabolic panel (BMP + Alb, Alk Phos, ALT, AST, Total. Bili, TP)     **A1C FUTURE 3mo   6. Elevated TSH  R79.89 TSH with free T4 reflex     Thyroid peroxidase antibody     TSH with free T4 reflex     Thyroid peroxidase antibody     T4 free   7. Encounter for hepatitis C screening test for low risk patient  Z11.59 Hepatitis C antibody     Hepatitis C antibody   8. Screening for HIV (human immunodeficiency virus)  Z11.4 HIV Antigen Antibody Combo     HIV Antigen Antibody Combo   9. Metatarsalgia of both feet  M77.41 Orthopedic  Referral    M77.42    10. Plantar fasciitis  M72.2 Orthopedic  Referral   11. Major depression in complete remission (H)  F32.5    12. Bilateral leg pain  M79.604     M79.605    13. Elevated LFTs  R79.89        Routine medical examination:  Records requested for last pap/hpv screening which was done one year ago. Sexually active, denies any vaginal symptoms or concerns about STI. Offered chlamydia screening- declined by Tana. Influenza vaccine administered. Routine one-time screening for hepatitis C, HIV.     New Type 2 Diabetes Mellitus:  Last A1C 6.4, today's A1C is 7.8. Fasting glucose 114. Continue to work on increasing activity, portion control. Reestablishing with psychiatry. Weight gain may be due to taking Abilify.    ADHD:  Taking college courses. Continue and refilled Amphet-Dextroamphet 3 to help manage symptoms.     Elevated Lipids:  Checking lipid  "profile today- results within normal limits.     Subclinical hypothyroidism:  6/3/2021 TSH 7.64. TSH today 4.74, with free T4 reflex of 0.91, Thyroid peroxidase antibody today.     Metatarsalgia of feet:  Bilateral foot pain and calf pain since starting her walking class at college the end of August. Has tried new shoes without improvement. Has low arches. PIP joint tenderness. Referral to podiatry    Bilateral Leg Pain:  Calf pain with walking. Tight IT band, hamstrings. Stretching reviewed, handout with stretches supplied and reviewed.     Elevated LFTs:   and  today. No abdominal discomfort. Might be attributed to Abilify.      Patient has been advised of split billing requirements and indicates understanding: Yes  COUNSELING:  Reviewed preventive health counseling, as reflected in patient instructions    Estimated body mass index is 50.21 kg/m  as calculated from the following:    Height as of this encounter: 1.681 m (5' 6.2\").    Weight as of this encounter: 142 kg (313 lb).    Weight management plan: Discussed healthy diet and exercise guidelines    She reports that she has never smoked. She has never used smokeless tobacco.      Counseling Resources:  ATP IV Guidelines  Pooled Cohorts Equation Calculator  Breast Cancer Risk Calculator  BRCA-Related Cancer Risk Assessment: FHS-7 Tool  FRAX Risk Assessment  ICSI Preventive Guidelines  Dietary Guidelines for Americans, 2010  USDA's MyPlate  ASA Prophylaxis  Lung CA Screening    I am acting as a scribe for this visit. I have written the the note, but final editing is from RONNY Cisse, RONNY student    This patient was seen and examined by myself as well as the NP student.  The NP student scribed the note and I have reviewed it, edited it appropriately, and agree with the final documentation.     Gina Ferraro NP  [facility]  "

## 2021-09-14 ENCOUNTER — TELEPHONE (OUTPATIENT)
Dept: FAMILY MEDICINE | Facility: CLINIC | Age: 21
End: 2021-09-14

## 2021-09-14 ENCOUNTER — MYC MEDICAL ADVICE (OUTPATIENT)
Dept: FAMILY MEDICINE | Facility: CLINIC | Age: 21
End: 2021-09-14

## 2021-09-14 DIAGNOSIS — E11.65 TYPE 2 DIABETES MELLITUS WITH HYPERGLYCEMIA, WITHOUT LONG-TERM CURRENT USE OF INSULIN (H): Primary | ICD-10-CM

## 2021-09-14 LAB
HCV AB SERPL QL IA: NONREACTIVE
HIV 1+2 AB+HIV1 P24 AG SERPL QL IA: NONREACTIVE
THYROPEROXIDASE AB SERPL-ACNC: 399 IU/ML

## 2021-09-14 ASSESSMENT — ANXIETY QUESTIONNAIRES: GAD7 TOTAL SCORE: 8

## 2021-09-14 NOTE — TELEPHONE ENCOUNTER
Prior Authorization Retail Medication Request    Medication/Dose: Amphet-Dextroamphet 3-Bead ER 50 MG CP24  ICD code (if different than what is on RX):    Previously Tried and Failed:    Rationale:      Insurance Name:  [employer]   Insurance ID:  [id number]       Pharmacy Information (if different than what is on RX)  Name:    Phone:

## 2021-09-15 NOTE — TELEPHONE ENCOUNTER
Central Prior Authorization Team  Phone: [phone]    PA Initiation    Medication: Amphet-Dextroamphet 3-Bead ER 50 MG CP24  Insurance Company: [employer] - Phone [phone] Fax [phone]  Pharmacy Filling the Rx: [id number] - [place], [place] - [address]  Filling Pharmacy Phone: [phone]  Filling Pharmacy Fax:    Start Date: 9/15/2021

## 2021-09-15 NOTE — TELEPHONE ENCOUNTER
Prior Authorization Approval    Authorization Effective Date: 8/16/2021  Authorization Expiration Date: 9/15/2022  Medication: Amphet-Dextroamphet 3-Bead ER 50 MG CP24- APPROVED   Approved Dose/Quantity:   Reference #:     Insurance Company: [employer] - Phone [phone] Fax [phone]  Expected CoPay:       CoPay Card Available:      Foundation Assistance Needed:    Which Pharmacy is filling the prescription (Not needed for infusion/clinic administered): [id number] - [place], [place] - [facility] [address]  Pharmacy Notified: Yes  Patient Notified:  **Instructed pharmacy to notify patient when script is ready to /ship.**

## 2021-09-16 ENCOUNTER — OFFICE VISIT (OUTPATIENT)
Dept: PODIATRY | Facility: CLINIC | Age: 21
End: 2021-09-16
Attending: NURSE PRACTITIONER
Payer: COMMERCIAL

## 2021-09-16 ENCOUNTER — ANCILLARY PROCEDURE (OUTPATIENT)
Dept: GENERAL RADIOLOGY | Facility: CLINIC | Age: 21
End: 2021-09-16
Attending: PODIATRIST
Payer: COMMERCIAL

## 2021-09-16 VITALS
HEIGHT: 66 IN | BODY MASS INDEX: 47.09 KG/M2 | WEIGHT: 293 LBS | SYSTOLIC BLOOD PRESSURE: 130 MMHG | DIASTOLIC BLOOD PRESSURE: 84 MMHG

## 2021-09-16 DIAGNOSIS — E66.01 CLASS 3 SEVERE OBESITY DUE TO EXCESS CALORIES WITHOUT SERIOUS COMORBIDITY WITH BODY MASS INDEX (BMI) OF 45.0 TO 49.9 IN ADULT (H): ICD-10-CM

## 2021-09-16 DIAGNOSIS — M24.573 EQUINUS CONTRACTURE OF ANKLE: ICD-10-CM

## 2021-09-16 DIAGNOSIS — E66.813 CLASS 3 SEVERE OBESITY DUE TO EXCESS CALORIES WITHOUT SERIOUS COMORBIDITY WITH BODY MASS INDEX (BMI) OF 45.0 TO 49.9 IN ADULT (H): ICD-10-CM

## 2021-09-16 DIAGNOSIS — M77.41 METATARSALGIA OF BOTH FEET: ICD-10-CM

## 2021-09-16 DIAGNOSIS — M77.9 CAPSULITIS: Primary | ICD-10-CM

## 2021-09-16 DIAGNOSIS — E11.9 CONTROLLED TYPE 2 DIABETES MELLITUS WITHOUT COMPLICATION, WITHOUT LONG-TERM CURRENT USE OF INSULIN (H): ICD-10-CM

## 2021-09-16 DIAGNOSIS — M77.42 METATARSALGIA OF BOTH FEET: ICD-10-CM

## 2021-09-16 DIAGNOSIS — M72.2 PLANTAR FASCIITIS: ICD-10-CM

## 2021-09-16 PROCEDURE — 73630 X-RAY EXAM OF FOOT: CPT | Mod: TC | Performed by: RADIOLOGY

## 2021-09-16 PROCEDURE — 99203 OFFICE O/P NEW LOW 30 MIN: CPT | Performed by: PODIATRIST

## 2021-09-16 ASSESSMENT — PAIN SCALES - GENERAL: PAINLEVEL: NO PAIN (1)

## 2021-09-16 ASSESSMENT — MIFFLIN-ST. JEOR: SCORE: 2199.69

## 2021-09-16 NOTE — PROGRESS NOTES
HPI:  Tnaa Hernandez is a 21 year old female who is seen in consultation at the request of Gina Ferraro NP    Pt presents for eval of:   (Onset, Location, L/R, Character, Treatments, Injury if yes)    XR Left and Right foot 9/16/2021    DM type 2, dx Sept 2021     Onset early Sept 2021, started walking program without injury.  No previous injuries or traumas.  She notes that she started a walking program at school she is having more pain and even bruising behind the fourth toe and discomfort across the ball the foot as a whole.  She attempted changes in shoe gear and this has not been helpful.  She understands that her weight is a good portion of this as she weighs 313 pounds.  She also admits that she has had eating disorders in the past and has utilized psychotherapy.  Recently diagnosed about 1 week ago with diabetes.    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    ROS:  10 point ROS neg other than the symptoms noted above in the HPI.    Patient Active Problem List   Diagnosis     Major depression in complete remission (H)     Attention deficit hyperactivity disorder (ADHD), unspecified ADHD type     Class 3 severe obesity due to excess calories without serious comorbidity with body mass index (BMI) of 45.0 to 49.9 in adult (H)     Vitamin deficiency     Diabetes mellitus, type 2 (H)       PAST MEDICAL HISTORY:   Past Medical History:   Diagnosis Date     Anxiety      Depressive disorder         PAST SURGICAL HISTORY: History reviewed. No pertinent surgical history.     MEDICATIONS:   Current Outpatient Medications:      Amphet-Dextroamphet 3-Bead ER 50 MG CP24, Take 50 mg by mouth daily, Disp: 30 capsule, Rfl: 0     etonogestrel (IMPLANON/NEXPLANON) 68 MG IMPL, 1 each by Subdermal route once , Disp: , Rfl:      FLUoxetine (PROZAC) 40 MG capsule, Take 1 capsule (40 mg) by mouth daily, Disp: 30 capsule, Rfl: 4     ARIPiprazole (ABILIFY) 10 MG tablet, Take 1 tablet (10 mg) by  mouth daily, Disp: 90 tablet, Rfl: 0     metFORMIN (GLUCOPHAGE) 500 MG tablet, Take 1 tablet (500 mg) by mouth 2 times daily (with meals), Disp: 60 tablet, Rfl: 0     ALLERGIES:    Allergies   Allergen Reactions     Wellbutrin [Bupropion] Hives        SOCIAL HISTORY:   Social History     Socioeconomic History     Marital status: Single     Spouse name: Not on file     Number of children: Not on file     Years of education: Not on file     Highest education level: Not on file   Occupational History     Not on file   Tobacco Use     Smoking status: Never Smoker     Smokeless tobacco: Never Used   Substance and Sexual Activity     Alcohol use: No     Alcohol/week: 0.0 standard drinks     Drug use: No     Sexual activity: Yes     Partners: Male     Birth control/protection: Implant   Other Topics Concern     Parent/sibling w/ CABG, MI or angioplasty before 65F 55M? Not Asked   Social History Narrative     Not on file     Social Determinants of Health     Financial Resource Strain:      Difficulty of Paying Living Expenses:    Food Insecurity:      Worried About Running Out of Food in the Last Year:      Ran Out of Food in the Last Year:    Transportation Needs:      Lack of Transportation (Medical):      Lack of Transportation (Non-Medical):    Physical Activity:      Days of Exercise per Week:      Minutes of Exercise per Session:    Stress:      Feeling of Stress :    Social Connections:      Frequency of Communication with Friends and Family:      Frequency of Social Gatherings with Friends and Family:      Attends Zoroastrianism Services:      Active Member of Clubs or Organizations:      Attends Club or Organization Meetings:      Marital Status:    Intimate Partner Violence:      Fear of Current or Ex-Partner:      Emotionally Abused:      Physically Abused:      Sexually Abused:         FAMILY HISTORY: History reviewed. No pertinent family history.     EXAM:Vitals: /84 (BP Location: Left arm, Patient Position:  "Sitting, Cuff Size: Adult Large)   Ht 1.681 m (5' 6.2\")   Wt 142 kg (313 lb)   BMI 50.21 kg/m    BMI= Body mass index is 50.21 kg/m .    General appearance: Patient is alert and fully cooperative with history & exam.  No sign of distress is noted during the visit.     Psychiatric: Affect is pleasant & appropriate.  Patient appears motivated to improve health.     Respiratory: Breathing is regular & unlabored while sitting.     HEENT: Hearing is intact to spoken word.  Speech is clear.  No gross evidence of visual impairment that would impact ambulation.       Vascular: DP & PT pulses are intact & regular bilaterally.  No significant edema or varicosities noted.  CFT and skin temperature is normal to both lower extremities.     Neurologic: Lower extremity sensation is intact to light touch.  No evidence of weakness or contracture in the lower extremities.  No evidence of neuropathy.  Protective threshold remains intact.    Dermatologic: Skin is intact to both lower extremities with adequate texture, turgor and tone about the integument.  No paronychia or evidence of soft tissue infection is noted.     Musculoskeletal: Patient is ambulatory without assistive device or brace.  There is 1.5 cm of localized erythema just proximal to the fourth metatarsal phalangeal joint right foot only.  Possibly subtle induration noted across the ball the foot on the dorsal aspect of the second third fourth fifth metatarsal phalangeal joint but no pinpoint area of pain and generalized discomfort across the ball the foot and metatarsal phalangeal joints right greater than left.  Ankle joint equinus is noted.  Patient is obese.  No painful or bony block throughout passive range of motion of the ankle subtalar midtarsal metatarsal phalangeal joints.  Manual muscle strength +5/5 to all 4 quadrants.  Also some discomfort along the medial band of the plantar fascia but this is quite tolerable.    Radiographs: 3 views right foot " demonstrate no acute cortical reaction or stress fracture noted.    Hemoglobin A1C (%)   Date Value   09/13/2021 7.8 (H)     Creatinine (mg/dL)   Date Value   09/13/2021 0.77   04/20/2017 0.71        ASSESSMENT:       ICD-10-CM    1. Capsulitis  M77.9    2. Plantar fasciitis  M72.2 Orthopedic  Referral   3. Controlled type 2 diabetes mellitus without complication, without long-term current use of insulin (H)  E11.9    4. Class 3 severe obesity due to excess calories without serious comorbidity with body mass index (BMI) of 45.0 to 49.9 in adult (H)  E66.01     Z68.42    5. Equinus contracture of ankle  M24.573         PLAN:  Reviewed patient[facility] in [facility].      9/16/2021   Attained and interpreted radiographs no fracture or stress reaction noted.  Recommended changes in shoe gear to provide sturdy shank and arch support and more forefoot cushion  Order placed for custom molded orthotics secondary to overall foot structure, plantar fasciitis history, obesity.  Patient may call in next couple weeks requesting letter for walking restrictions and this can be granted for her school class.     Also discussed obesity and how this contributes to overuse of her foot as well as diabetes.  She is newly diagnosed diabetes and admits that she has had difficulty with eating disorders in the past.  Recommended she follow-up with her therapist in regards to eating disorders and may visit with her primary care provider for any further help or direction with addressing these eating disorders and obesity.  Discussed how diabetes affects feet and poses a risk to her feet.  And answered all questions in regards to this.    Recommend she follow-up with me again in about 3 or 4 weeks to see how she is doing with changes in shoe gear.  Offered to immobilize her in a fracture boot and reduce her physical activities overall but she would like to keep walking and is quite motivated with her recent diagnosis of diabetes to keep  walking if she can tolerate it.  That seems like a reasonable trade off for now.  She is motivated to move forward with lifestyle changes right now.      Axel Taveras DPM

## 2021-09-16 NOTE — PATIENT INSTRUCTIONS
Reliable shoe stores: To maximize your experience and provide the best possible fit.  Be sure to show them your foot concerns and tell them Dr. Taveras sent you.      Stores listed in bold have only athletic shoes, and stores that are not bold are mostly casual or variety of shoes    [employer]  [address] [place] [address]  [phone]    [employer] [place]  [address] [place] [address]  [phone]     [employer] [facility] [employer]  [address] [place] [address]  [phone]    [employer]  [address] [facility]  [facility] [address]  [phone]    [employer]  [address] [place], [place] [address] [phone]    [employer]  [address] [place] [address]  [phone]                         [employer] Locations:     [address] [place] [address]   [phone]     [address] [place] [address] [address] [place] [address]   [phone]     [address] [place] [address]   [phone]     [address] [facility] [place] [address]   [phone]     [address] [place] [address]   [phone]     [address] [place] [address]   [phone]     [address] [facility] [address]   [place] [address]   [phone]     [address] [facility]. [address]   [place] [address]   [phone]             How to find reasonable shoes          The correct width    Correct Fitting    Correct Length      Foot Distortion    Posture Distortion                          Torsional Rigidity      Grasp behind the heel and underneath the foot and twist      Bad    Excessive torsion/twist in midfoot     Less torsion/twist in midfoot is better                   Heel Counter Rigidity      Grasp just above   midsole and squeeze      Bad    Soft heel counter      Good    Rigid Heel Counter      Flexion Rigidity      Grasp shoe and bend from forefoot to rearfoot              DIABETES AND YOUR FEET    What effect does  "diabetes have on the feet?  Diabetes can result in several problems in the feet including contractures of the tendons leading to deformities and reduced function of the bones, skin ulcers or open sores on pressure points or prominent deformities, reduced sensation, reduced blood flow and thus reduced oxygen and immune cells to the tiny vessels in our feet. This all leads to higher risk of hospitalization, infections, and amputations.     What is neuropathy?  Neuropathy is a term used to describe a loss of nerve function.  Patients with diabetes are at risk of developing neuropathy if their sugars continue to run high and are above the normal value of 140.  The elevated blood sugar in the body enters the nerves causing it to swell and impair nerve function.  The higher the blood sugar and the longer it is elevated, the more damage is done to nerves.  This damage is permanent and irreversible.  These damaged sensory nerves can then cause reduced feeling or cause pain.  Damaged motor nerves can reduce blood flow and white blood cells into into your foot, skin and bones reducing your ability to heal a small problem. And neuropathy can cause tendons to become unbalanced and contribute to the formation of deformity and contractures in our feet. Often times, neuropathy can be prevented by controlling your blood sugar.  Your risk of developing neuropathy goes up dramatically as your hemoglobin A1C raises above 7.5.      How do I know if I have neuropathy?  When a person develops neuropathy, they usually begin to feel numbness or tingling in their feet and sometimes in their legs.  Other symptoms may include painful burning or hot feet, tingling, electrical sensations or feeling like insects or ants are crawling on your feet or legs.  If blood sugar remains above 140  for long periods of time, neuropathy can also occur in the hands.  When a person loses their \"protective threshold\" or ability to detect a 5.07 [place] " Angela monofilament is when they have elevated risk for developing foot deformity, contractures, foot infections, amputations, Charcot arthropathy, or other complications. Keep your hemoglobin A1C below 7.5 to reduce this risk.    What is vascular disease?  Peripheral vascular disease is a term used to describe a loss or decrease in circulation (blood flow).  There is a problem in getting blood, immune cells, and oxygen to areas that need it.  Similar to neuropathy, sugars can build up in the walls of the arteries (blood vessels) and cause them to become swollen, thickened and hardened.  This decreases the amount of blood that can go to an area that needs it.  Though this is common in the legs of diabetic patients, it can also affect other arteries (blood vessels) in the body such as in the heart, kidney, eyes, and the blood flow into bones.  It is often seen first in the small vessels of her body notably our feet and toes.    How do I know if I have vascular disease?  In the legs, vascular disease usually results in cramping.  Patients who develop leg cramps after walking the same distance every time (i.e. One block, half a mile, ect.) need to let their doctors know so that their circulation may be checked.  Cramps causing severe pain in the feet and/or legs while sleeping and the cramps go away when you stand or hang your legs off the side of the bed, may also be a sign of poor blood circulation.  Occasional cramping in cold weather or on rare occasions with activity may not be due to poor circulation, but you should inform your doctor.    How can these problems be prevented?  The key to prevention is good blood sugar control all day every day.  Inadequate blood sugar control is the most common way patients experience these problems. Reducing, controlling and measuring your daily consumption of sugar or carbohydrates is essential to understanding and managing diabetes.  Physical activity (exercise) is a very  good way to help decrease your blood sugars.  Exercise can lower your blood sugar, blood pressure, and cholesterol.  It also reduces your risk for heart disease and stroke, relieves stress, and strengthens your heart, muscles and bones. Physical activity also increases your balance and reduces development of contractures and foot deformities over time. In addition, regular activity helps insulin work better, improves your blood circulation, and keeps your joints flexible.  If you're trying to lose weight, a combination of exercise and wise food choices can help you reach your target weight and maintain it.  Activity and exercise alone can not make up for poor diet choices, eating too much, or eating too many sugars or carbohydrates.  Ask your doctor for help when you are not meeting your blood sugar goals. Changes or increases in medication are powerful tools in reducing your blood sugar.    Know your blood sugar and hemoglobin A1C trend.  Upon first diagnosis or during acute illness, checking your blood sugar 4 times a day can help you understand how your diet, activity, and lifestyle affect your blood sugar.  Monitoring your hemoglobin A1C can help you understand how well you are managing blood sugar over the long run.  Your hemoglobin A1C tells you what your blood sugar averages all day, every day, over the past 90 days.       To experience the lowest risk of complications associated with diabetes such as neuropathy, loss of blood flow, bone or joint infection, charcot arthropathy, or amputation, the American Diabetes Association recommends a target hemoglobin A1C of less than 7.0%, while the American Association of Clinical Endocrinologists' recommendation is 6.5% or less.  Both organizations advise that the goals be individualized based on patient factors such as other health conditions, history of hypoglycemia, education, and life expectancy.  A patients risk of experiencing complications associated with  diabetes is only slightly elevated with a hemoglobin A1C above 6.0.  However, this risk goes up exponentially when the hemoglobin A1C is above 7.5.  The longer the hemoglobin A1C is elevated, the more risk that patient will experience in their lifetime. The damage that occurs to nerves, blood vessels, tendons, bones and body organs, while their hemoglobin A1C is elevated is mostly irreversible and worsens with each additional time period of elevated hemoglobin A1C.     You must understand and manage your disease.  Your health insurance or medical team cannot manage this disease for you.  When you take responsibility for understanding and managing your disease, you can expect to experience fewer problems associated with diabetes in your lifetime.  You will  Also experience a higher quality of life and health and reduced cost of health care.    Diabetic Foot Care Recommendations  The following are recommendations for avoiding serious foot problems or injury    DO'S  1. Be aware of your hemoglobin A1C and continue to follow up with your medical team for adjustments in your lifestyle and medication until your reach your A1C goal.  Keep this below 7.5 to reduce your risk of developing complications associated with diabetes.    2.  Wash your feet with lukewarm water and a mild soap and then dry them thoroughly, especially between the toes.  Gently floss your towel or washcloth between each toe at every bath.  Soaking your feet in water cannot clean dead skin, debris, and bacteria from your feet and is not necessary.   3. Examine your feet daily looking for cuts, corns, blisters, cracks, ect..., especially after wearing new shoes or increased or changed activities.  Make sure to look between your toes.  If you cannot see the bottom of your feet, set a mirror on the floor and hold your foot over it, or ask a family member to examine your feet for you daily.  Contact your doctor immediately if new problems are noted or if  sores are not healing.  4.  Immediately apply moisturizer cream such as Cetaphil to the tops and bottoms of your feet, avoiding areas between the toes.  Apply sunscreen or cover your feet if they will be exposed to extended sunlight.  5.Use clean comfortable shoes.  Socks should not have thick seams or cut off the circulation around the leg.  Break in new shoes slowly and rotate with older shoes until broken in.  Check the inside of your shoes with your hand to look for areas of irritation or objects that may have fallen into your shoes.    6. Keep slippers by the side of your bed for use during the night.  7. Shoes should be fitted by a professional and should not cause areas of irritation.  Check your feet regularly when wearing a new pair of shoes and replace them as needed.  8.  Talk to your doctor about proper exercise.  Exercise and stretching stimulate blood flow to your feet and maintain proper glucose levels.  Use it or lose it!  9.  Monitor your blood glucose level and your hemoglobin A1C.  Notify your doctor immediately if your blood sugar is abnormally high or low.  10.  Cut your nails straight across, but then gently round any sharp edges with a nail file.  If you have neuropathy, peripheral vascular disease or cannot see that well to trim your own toenails, see a medical professional for care.    DONT'S  1.  Do not soak your feet if you have an open sore or your provider has informed you that you have neuropathy or loss of protective threshold.  Use only lukewarm water and always check the temperature with your hand as hot water can easily burn your feet.    2.  Never use a hot water bottle or heating pad on your feet.  Also do not apply hot or cold compresses to your feet.  With decreased sensation, you could burn or freeze your feet.  Do not rest your feet near a heat source such as a heater or heat register.    3.  Do not apply any of these to your feet:    - over the counter medicine for corns or  warts    -  Harsh chemicals like boric acid    -  Do not self-treat corns, cuts, blisters or infections.  Always consult your doctor.   4.  Do not wear sandals, slippers or walk barefoot, especially on harsh surfaces.  5.  If you smoke, stop!!!

## 2021-09-16 NOTE — LETTER
9/16/2021         RE: Tana Hernandez  [id number] [facility] [address]        Dear Colleague,    Thank you for referring your patient, Tana Hernandez, to the [facility]. Please see a copy of my visit note below.    HPI:  Tana Hernandez is a 21 year old female who is seen in consultation at the request of Gina Ferraro NP    Pt presents for eval of:   (Onset, Location, L/R, Character, Treatments, Injury if yes)    XR Left and Right foot 9/16/2021    DM type 2, dx Sept 2021     Onset early Sept 2021, started walking program without injury.  No previous injuries or traumas.  She notes that she started a walking program at school she is having more pain and even bruising behind the fourth toe and discomfort across the ball the foot as a whole.  She attempted changes in shoe gear and this has not been helpful.  She understands that her weight is a good portion of this as she weighs 313 pounds.  She also admits that she has had eating disorders in the past and has utilized psychotherapy.  Recently diagnosed about 1 week ago with diabetes.    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    ROS:  10 point ROS neg other than the symptoms noted above in the HPI.    Patient Active Problem List   Diagnosis     Major depression in complete remission (H)     Attention deficit hyperactivity disorder (ADHD), unspecified ADHD type     Class 3 severe obesity due to excess calories without serious comorbidity with body mass index (BMI) of 45.0 to 49.9 in adult (H)     Vitamin deficiency     Diabetes mellitus, type 2 (H)       PAST MEDICAL HISTORY:   Past Medical History:   Diagnosis Date     Anxiety      Depressive disorder         PAST SURGICAL HISTORY: History reviewed. No pertinent surgical history.     MEDICATIONS:   Current Outpatient Medications:      Amphet-Dextroamphet 3-Bead ER 50 MG CP24, Take 50 mg by mouth daily, Disp: 30 capsule, Rfl: 0     etonogestrel  (IMPLANON/NEXPLANON) 68 MG IMPL, 1 each by Subdermal route once , Disp: , Rfl:      FLUoxetine (PROZAC) 40 MG capsule, Take 1 capsule (40 mg) by mouth daily, Disp: 30 capsule, Rfl: 4     ARIPiprazole (ABILIFY) 10 MG tablet, Take 1 tablet (10 mg) by mouth daily, Disp: 90 tablet, Rfl: 0     metFORMIN (GLUCOPHAGE) 500 MG tablet, Take 1 tablet (500 mg) by mouth 2 times daily (with meals), Disp: 60 tablet, Rfl: 0     ALLERGIES:    Allergies   Allergen Reactions     Wellbutrin [Bupropion] Hives        SOCIAL HISTORY:   Social History     Socioeconomic History     Marital status: Single     Spouse name: Not on file     Number of children: Not on file     Years of education: Not on file     Highest education level: Not on file   Occupational History     Not on file   Tobacco Use     Smoking status: Never Smoker     Smokeless tobacco: Never Used   Substance and Sexual Activity     Alcohol use: No     Alcohol/week: 0.0 standard drinks     Drug use: No     Sexual activity: Yes     Partners: Male     Birth control/protection: Implant   Other Topics Concern     Parent/sibling w/ CABG, MI or angioplasty before 65F 55M? Not Asked   Social History Narrative     Not on file     Social Determinants of Health     Financial Resource Strain:      Difficulty of Paying Living Expenses:    Food Insecurity:      Worried About Running Out of Food in the Last Year:      Ran Out of Food in the Last Year:    Transportation Needs:      Lack of Transportation (Medical):      Lack of Transportation (Non-Medical):    Physical Activity:      Days of Exercise per Week:      Minutes of Exercise per Session:    Stress:      Feeling of Stress :    Social Connections:      Frequency of Communication with Friends and Family:      Frequency of Social Gatherings with Friends and Family:      Attends Sikhism Services:      Active Member of Clubs or Organizations:      Attends Club or Organization Meetings:      Marital Status:    Intimate Partner Violence:  "     Fear of Current or Ex-Partner:      Emotionally Abused:      Physically Abused:      Sexually Abused:         FAMILY HISTORY: History reviewed. No pertinent family history.     EXAM:Vitals: /84 (BP Location: Left arm, Patient Position: Sitting, Cuff Size: Adult Large)   Ht 1.681 m (5' 6.2\")   Wt 142 kg (313 lb)   BMI 50.21 kg/m    BMI= Body mass index is 50.21 kg/m .    General appearance: Patient is alert and fully cooperative with history & exam.  No sign of distress is noted during the visit.     Psychiatric: Affect is pleasant & appropriate.  Patient appears motivated to improve health.     Respiratory: Breathing is regular & unlabored while sitting.     HEENT: Hearing is intact to spoken word.  Speech is clear.  No gross evidence of visual impairment that would impact ambulation.       Vascular: DP & PT pulses are intact & regular bilaterally.  No significant edema or varicosities noted.  CFT and skin temperature is normal to both lower extremities.     Neurologic: Lower extremity sensation is intact to light touch.  No evidence of weakness or contracture in the lower extremities.  No evidence of neuropathy.  Protective threshold remains intact.    Dermatologic: Skin is intact to both lower extremities with adequate texture, turgor and tone about the integument.  No paronychia or evidence of soft tissue infection is noted.     Musculoskeletal: Patient is ambulatory without assistive device or brace.  There is 1.5 cm of localized erythema just proximal to the fourth metatarsal phalangeal joint right foot only.  Possibly subtle induration noted across the ball the foot on the dorsal aspect of the second third fourth fifth metatarsal phalangeal joint but no pinpoint area of pain and generalized discomfort across the ball the foot and metatarsal phalangeal joints right greater than left.  Ankle joint equinus is noted.  Patient is obese.  No painful or bony block throughout passive range of motion of " the ankle subtalar midtarsal metatarsal phalangeal joints.  Manual muscle strength +5/5 to all 4 quadrants.  Also some discomfort along the medial band of the plantar fascia but this is quite tolerable.    Radiographs: 3 views right foot demonstrate no acute cortical reaction or stress fracture noted.    Hemoglobin A1C (%)   Date Value   09/13/2021 7.8 (H)     Creatinine (mg/dL)   Date Value   09/13/2021 0.77   04/20/2017 0.71        ASSESSMENT:       ICD-10-CM    1. Capsulitis  M77.9    2. Plantar fasciitis  M72.2 Orthopedic  Referral   3. Controlled type 2 diabetes mellitus without complication, without long-term current use of insulin (H)  E11.9    4. Class 3 severe obesity due to excess calories without serious comorbidity with body mass index (BMI) of 45.0 to 49.9 in adult (H)  E66.01     Z68.42    5. Equinus contracture of ankle  M24.573         PLAN:  Reviewed patient's chart in [facility].      9/16/2021   Attained and interpreted radiographs no fracture or stress reaction noted.  Recommended changes in shoe gear to provide sturdy shank and arch support and more forefoot cushion  Order placed for custom molded orthotics secondary to overall foot structure, plantar fasciitis history, obesity.  Patient may call in next couple weeks requesting letter for walking restrictions and this can be granted for her school class.     Also discussed obesity and how this contributes to overuse of her foot as well as diabetes.  She is newly diagnosed diabetes and admits that she has had difficulty with eating disorders in the past.  Recommended she follow-up with her therapist in regards to eating disorders and may visit with her primary care provider for any further help or direction with addressing these eating disorders and obesity.  Discussed how diabetes affects feet and poses a risk to her feet.  And answered all questions in regards to this.    Recommend she follow-up with me again in about 3 or 4 weeks to see how  she is doing with changes in shoe gear.  Offered to immobilize her in a fracture boot and reduce her physical activities overall but she would like to keep walking and is quite motivated with her recent diagnosis of diabetes to keep walking if she can tolerate it.  That seems like a reasonable trade off for now.  She is motivated to move forward with lifestyle changes right now.      Axel Taveras DPM              Again, thank you for allowing me to participate in the care of your patient.        Sincerely,        Axel Taveras DPM

## 2021-09-17 ENCOUNTER — TELEPHONE (OUTPATIENT)
Dept: FAMILY MEDICINE | Facility: CLINIC | Age: 21
End: 2021-09-17

## 2021-09-17 NOTE — TELEPHONE ENCOUNTER
Nutrition Education Scheduling Outreach #1:    Call to patient to schedule. Patient will call back to schedule.      Lindsay Valero OnCall  Diabetes and Nutrition Scheduling

## 2021-09-28 ENCOUNTER — VIRTUAL VISIT (OUTPATIENT)
Dept: PSYCHIATRY | Facility: CLINIC | Age: 21
End: 2021-09-28
Attending: NURSE PRACTITIONER
Payer: COMMERCIAL

## 2021-09-28 ENCOUNTER — VIRTUAL VISIT (OUTPATIENT)
Dept: PSYCHOLOGY | Facility: CLINIC | Age: 21
End: 2021-09-28
Payer: COMMERCIAL

## 2021-09-28 DIAGNOSIS — F50.9 EATING DISORDER, UNSPECIFIED TYPE: ICD-10-CM

## 2021-09-28 DIAGNOSIS — F39 MOOD DISORDER (H): ICD-10-CM

## 2021-09-28 DIAGNOSIS — F60.3 BORDERLINE PERSONALITY DISORDER (H): Primary | ICD-10-CM

## 2021-09-28 DIAGNOSIS — F32.1 CURRENT MODERATE EPISODE OF MAJOR DEPRESSIVE DISORDER, UNSPECIFIED WHETHER RECURRENT (H): ICD-10-CM

## 2021-09-28 DIAGNOSIS — F90.9 ATTENTION DEFICIT HYPERACTIVITY DISORDER (ADHD), UNSPECIFIED ADHD TYPE: Primary | ICD-10-CM

## 2021-09-28 DIAGNOSIS — F41.9 ANXIETY: ICD-10-CM

## 2021-09-28 DIAGNOSIS — F90.9 ATTENTION DEFICIT HYPERACTIVITY DISORDER (ADHD), UNSPECIFIED ADHD TYPE: ICD-10-CM

## 2021-09-28 DIAGNOSIS — F31.81 BIPOLAR 2 DISORDER (H): ICD-10-CM

## 2021-09-28 PROCEDURE — 99204 OFFICE O/P NEW MOD 45 MIN: CPT | Mod: GT | Performed by: PSYCHIATRY & NEUROLOGY

## 2021-09-28 PROCEDURE — 90791 PSYCH DIAGNOSTIC EVALUATION: CPT | Mod: GT | Performed by: PSYCHOLOGIST

## 2021-09-28 RX ORDER — LAMOTRIGINE 25 MG/1
TABLET ORAL
Qty: 42 TABLET | Refills: 0 | Status: SHIPPED | OUTPATIENT
Start: 2021-09-28 | End: 2021-10-26

## 2021-09-28 RX ORDER — LISDEXAMFETAMINE DIMESYLATE 40 MG/1
40 CAPSULE ORAL EVERY MORNING
Qty: 15 CAPSULE | Refills: 0 | Status: SHIPPED | OUTPATIENT
Start: 2021-09-28 | End: 2021-10-26

## 2021-09-28 RX ORDER — LAMOTRIGINE 100 MG/1
100 TABLET ORAL DAILY
Qty: 30 TABLET | Refills: 0 | Status: SHIPPED | OUTPATIENT
Start: 2021-10-26 | End: 2021-12-20

## 2021-09-28 ASSESSMENT — ANXIETY QUESTIONNAIRES
5. BEING SO RESTLESS THAT IT IS HARD TO SIT STILL: SEVERAL DAYS
GAD7 TOTAL SCORE: 7
2. NOT BEING ABLE TO STOP OR CONTROL WORRYING: SEVERAL DAYS
7. FEELING AFRAID AS IF SOMETHING AWFUL MIGHT HAPPEN: SEVERAL DAYS
6. BECOMING EASILY ANNOYED OR IRRITABLE: SEVERAL DAYS
GAD7 TOTAL SCORE: 7
GAD7 TOTAL SCORE: 7
3. WORRYING TOO MUCH ABOUT DIFFERENT THINGS: SEVERAL DAYS
7. FEELING AFRAID AS IF SOMETHING AWFUL MIGHT HAPPEN: SEVERAL DAYS
8. IF YOU CHECKED OFF ANY PROBLEMS, HOW DIFFICULT HAVE THESE MADE IT FOR YOU TO DO YOUR WORK, TAKE CARE OF THINGS AT HOME, OR GET ALONG WITH OTHER PEOPLE?: SOMEWHAT DIFFICULT
4. TROUBLE RELAXING: SEVERAL DAYS
1. FEELING NERVOUS, ANXIOUS, OR ON EDGE: SEVERAL DAYS

## 2021-09-28 ASSESSMENT — COLUMBIA-SUICIDE SEVERITY RATING SCALE - C-SSRS
1. IN THE PAST MONTH, HAVE YOU WISHED YOU WERE DEAD OR WISHED YOU COULD GO TO SLEEP AND NOT WAKE UP?: NO
2. HAVE YOU ACTUALLY HAD ANY THOUGHTS OF KILLING YOURSELF LIFETIME?: NO
4. HAVE YOU HAD THESE THOUGHTS AND HAD SOME INTENTION OF ACTING ON THEM?: NO
5. HAVE YOU STARTED TO WORK OUT OR WORKED OUT THE DETAILS OF HOW TO KILL YOURSELF? DO YOU INTEND TO CARRY OUT THIS PLAN?: NO
4. HAVE YOU HAD THESE THOUGHTS AND HAD SOME INTENTION OF ACTING ON THEM?: NO
2. HAVE YOU ACTUALLY HAD ANY THOUGHTS OF KILLING YOURSELF?: NO
5. HAVE YOU STARTED TO WORK OUT OR WORKED OUT THE DETAILS OF HOW TO KILL YOURSELF? DO YOU INTEND TO CARRY OUT THIS PLAN?: NO
3. HAVE YOU BEEN THINKING ABOUT HOW YOU MIGHT KILL YOURSELF?: NO
1. IN THE PAST MONTH, HAVE YOU WISHED YOU WERE DEAD OR WISHED YOU COULD GO TO SLEEP AND NOT WAKE UP?: NO

## 2021-09-28 ASSESSMENT — PATIENT HEALTH QUESTIONNAIRE - PHQ9
SUM OF ALL RESPONSES TO PHQ QUESTIONS 1-9: 10
10. IF YOU CHECKED OFF ANY PROBLEMS, HOW DIFFICULT HAVE THESE PROBLEMS MADE IT FOR YOU TO DO YOUR WORK, TAKE CARE OF THINGS AT HOME, OR GET ALONG WITH OTHER PEOPLE: SOMEWHAT DIFFICULT
SUM OF ALL RESPONSES TO PHQ QUESTIONS 1-9: 10

## 2021-09-28 NOTE — PROGRESS NOTES
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 My Recovery from Borderline Personality Disorder through Dialectical Behavior Therapy, Temple, & Online Dating.    By Angelique Sood. 2010.    I Hate You - Don t Leave Me: Understanding the Borderline Personality.   By Gopal Laurent. 2010.    Stop Walking on Eggshells: Taking your Life Back when Someone You Care About has Borderline Personality Disorder.    By MICHELINE Corley. 2010.    Overcoming Borderline Personality Disorder: A Family Guide for Healing and Change 1st Edition.  By Melinda Meyers. 2010.    [place] Someone with Borderline Personality Disorder: How to Keep Out-of-Control Emotions from Destroying Your Relationship.    By Gayathri Lei and Cheryle Mao. 2011.    Stop Caretaking the Borderline or Narcissist: How to End the Drama and Get on with Your Life.   By Pily Avila. 2014.    Borderline Personality Disorder: The Ultimate Practical Approach to Understanding, Coping, and Living with Borderline Personality Disorder.    By Cassandra Thorne. 2014.    Borderline Personality Disorder in Adolescents: What to Do When Your Teen has BPD.  A Complete Guide for Families.    By Dr. Hugh Morataya. 2014.    This is Not the End. Conversations on Borderline Personality Disorder.   Edited by Rosa Napoles. 2016. (This is a  collection of experiences from individuals dealing with BPD).    Beyond Borderline: True Stories of Recovery from Borderline Personality Disorder.   By Osmar Mccarthy and Ashu Lopez. 2016.      Discussed risks of stimulant medication including, but not limited to, decreased appetite, risk of tics (and that they may be lasting), trouble sleeping, cardiac risks such as increased heart rate and blood pressure, and rare risk of sudden cardiac death.  Also risk of addiction/tolerance/dependence.    Discussed Lamictal (lamotrigine) can cause serious rashes including Patino-Eric syndrome which may requiring hospitalization and discontinuation of treatment. If any signs of a rash occur, please see your Primary Care Provider or a dermatologist immediately.     Community Resources:    National Suicide Prevention Lifeline: 875.337.7104 (TTY: 508.457.6692). Call anytime for help.  (www.suicidepreventionlifeline.org)  National [place] [employer] Mental Illness (www.rayo.org): 455.764.2690 or 101-311-8478.   Mental Health Association (www.mentalhealth.org): 179.416.3999 or 113-511-3312.  [place] Crisis Text Line: Text [place] to 147941  Suicide LifeLine Chat: [employer].org/chat    Administrative Billing:   Phone Call/Video Duration: 33 Minutes  Start: 2:07p  Stop: 2:40p    Complexity of Care: Patient with multiple psychiatric diagnoses and multiple medication changes adding to complexity of care.    Patient Status:  Patient will continue to be seen for ongoing consultation and stabilization.    Signed:   Patty Franco DO  [facility]    Disclaimer: This note consists of symbols derived from keyboarding, dictation and/or voice recognition software. As a result, there may be errors in the script that have gone undetected. Please consider this when interpreting information found in this chart.

## 2021-09-28 NOTE — Clinical Note
Please call this patient to get them scheduled for a follow-up visit in 3-4 weeks. Please schedule with me and the [facility]. Thanks!
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
## 2021-09-28 NOTE — PATIENT INSTRUCTIONS
Treatment Plan:    Continue fluoxetine 40 mg daily for mood, anxiety, history of PTSD, disordered eating/binge eating.     Discontinue Abilify/aripiprazole.  Already stopped.    Discontinue Adderall 3 bead extended release.    Start Vyvanse 40 mg daily for ADHD.    Start Lamotrigine: take 25 mg daily for two weeks then increase to 50 mg daily for two weeks then increase to 100 mg until follow-up appointment.    Continue therapy as planned.  Strongest recommendation is for DBT.    Continue all other cares per primary care provider.     Continue all other medications as reviewed per electronic medical record today.     Safety plan reviewed. To the Emergency Department as needed or call after hours crisis line at [phone] or [phone]. [place] Crisis Text [place]: Text [place] to 050930  or  Suicide LifeLine Chat: suicidepreventionlifeline.org/chat    Schedule an appointment with me in 3-4 weeks or sooner as needed.  Call [facility] at [phone] to schedule.    Follow up with primary care provider as planned or sooner if needed for acute medical concerns.    Call the psychiatric nurse line with medication questions or concerns at [phone].    PeekYout may be used to communicate with your provider, but this is not intended to be used for emergencies.    Patient Education:  Some [facility] DBT resources:    Niki Shaikh   [phone]   [email or web address]     Dee Dee   [phone]   [email or web address]     [facility]   [phone]   [email or web address]     [facility]   [phone]   [email or web address]     The Buddha & The Borderline: My Recovery from Borderline Personality Disorder through Dialectical Behavior Therapy, Orthodoxy, & Online Dating.    By Angelique Sood. 2010.    I Hate You - Don t Leave Me: Understanding the Borderline Personality.   By Gopal Laurent. 2010.    Stop Walking on Eggshells: Taking your  Life Back when Someone You Care About has Borderline Personality Disorder.    By MICHELINE Corley. 2010.    Overcoming Borderline Personality Disorder: A Family Guide for Healing and Change 1st Edition.  By Melinda Meyers. 2010.    [place] Someone with Borderline Personality Disorder: How to Keep Out-of-Control Emotions from Destroying Your Relationship.    By Gayathri Lei and Cheryle Mao. 2011.    Stop Caretaking the Borderline or Narcissist: How to End the Drama and Get on with Your Life.   By Pily Avila. 2014.    Borderline Personality Disorder: The Ultimate Practical Approach to Understanding, Coping, and Living with Borderline Personality Disorder.    By Cassandra Thorne. 2014.    Borderline Personality Disorder in Adolescents: What to Do When Your Teen has BPD.  A Complete Guide for Families.    By Dr. Hugh Morataya. 2014.    This is Not the End. Conversations on Borderline Personality Disorder.   Edited by Rosa Napoles. 2016. (This is a collection of experiences from individuals dealing with BPD).    Beyond Borderline: True Stories of Recovery from Borderline Personality Disorder.   By Osmar Mccarthy and Ashu Lopez. 2016.      Discussed risks of stimulant medication including, but not limited to, decreased appetite, risk of tics (and that they may be lasting), trouble sleeping, cardiac risks such as increased heart rate and blood pressure, and rare risk of sudden cardiac death.  Also risk of addiction/tolerance/dependence.    Discussed Lamictal (lamotrigine) can cause serious rashes including Patino-Eric syndrome which may requiring hospitalization and discontinuation of treatment. If any signs of a rash occur, please see your Primary Care Provider or a dermatologist immediately.     Community Resources:    National Suicide Prevention Lifeline: 385.709.1906 (TTY: 240.988.7792). Call anytime for help.  (www.suicidepreventionlifeline.org)  National [place] on Mental Illness  (www.rayo.org): 079-826-6676 or 999-469-3782.   Mental Health Association (www.mentalhealth.org): 499.494.3095 or 044-583-5897.  [place] Crisis Text [place]: Text [place] to 679422  Suicide LifeLine Chat: suicidepreventionlifeline.org/chat

## 2021-09-29 ENCOUNTER — TELEPHONE (OUTPATIENT)
Dept: PSYCHOLOGY | Facility: CLINIC | Age: 21
End: 2021-09-29

## 2021-09-29 ASSESSMENT — ANXIETY QUESTIONNAIRES: GAD7 TOTAL SCORE: 7

## 2021-09-29 NOTE — TELEPHONE ENCOUNTER
Prior Authorization Retail Medication Request    Medication/Dose: lisdexamfetamine (VYVANSE) 40 MG capsule  ICD code (if different than what is on RX):  Attention deficit hyperactivity disorder (ADHD), unspecified ADHD type [F90.9]   Previously Tried and Failed:    Rationale:      Insurance Name:  [employer]  Insurance ID: [id number]    Pharmacy Information (if different than what is on RX)  Name:  [id number], [place] - [address] [place] [address] [address]  Phone:[phone]

## 2021-10-01 NOTE — TELEPHONE ENCOUNTER
Prior Authorization Approval    Authorization Effective Date: 9/1/2021  Authorization Expiration Date: 10/1/2022  Medication: lisdexamfetamine (VYVANSE) 40 MG-APPROVED  Approved Dose/Quantity:    Reference #:     Insurance Company: [employer] - Phone [phone] Fax [phone]  Expected CoPay:       CoPay Card Available:      Foundation Assistance Needed:    Which Pharmacy is filling the prescription (Not needed for infusion/clinic administered): [id number] - [place], [place] - [facility] [address] [facility] [address]  Pharmacy Notified: Yes  Patient Notified: Yes  **Instructed pharmacy to notify patient when script is ready to /ship.**

## 2021-10-01 NOTE — TELEPHONE ENCOUNTER
Central Prior Authorization Team   Phone: [phone]    PA Initiation    Medication: lisdexamfetamine (VYVANSE) 40 MG capsule  Insurance Company: [employer]s - Phone [phone] Fax [phone]  Pharmacy Filling the Rx: [facility] #[id number] - [place], [place] - [address] [facility] [address]  Filling Pharmacy Phone: [phone]  Filling Pharmacy Fax: [phone]  Start Date: 10/1/2021

## 2021-10-26 ENCOUNTER — VIRTUAL VISIT (OUTPATIENT)
Dept: PSYCHIATRY | Facility: CLINIC | Age: 21
End: 2021-10-26
Payer: COMMERCIAL

## 2021-10-26 ENCOUNTER — VIRTUAL VISIT (OUTPATIENT)
Dept: BEHAVIORAL HEALTH | Facility: TELEHEALTH | Age: 21
End: 2021-10-26
Payer: COMMERCIAL

## 2021-10-26 DIAGNOSIS — F90.9 ATTENTION DEFICIT HYPERACTIVITY DISORDER (ADHD), UNSPECIFIED ADHD TYPE: Primary | ICD-10-CM

## 2021-10-26 DIAGNOSIS — F41.9 ANXIETY DISORDER, UNSPECIFIED TYPE: ICD-10-CM

## 2021-10-26 DIAGNOSIS — F32.1 CURRENT MODERATE EPISODE OF MAJOR DEPRESSIVE DISORDER, UNSPECIFIED WHETHER RECURRENT (H): ICD-10-CM

## 2021-10-26 DIAGNOSIS — F31.81 BIPOLAR 2 DISORDER (H): ICD-10-CM

## 2021-10-26 DIAGNOSIS — F32.5 MAJOR DEPRESSION IN COMPLETE REMISSION (H): ICD-10-CM

## 2021-10-26 DIAGNOSIS — F60.3 BORDERLINE PERSONALITY DISORDER (H): ICD-10-CM

## 2021-10-26 PROCEDURE — 90832 PSYTX W PT 30 MINUTES: CPT | Mod: GT | Performed by: SOCIAL WORKER

## 2021-10-26 PROCEDURE — 99214 OFFICE O/P EST MOD 30 MIN: CPT | Mod: GT | Performed by: PSYCHIATRY & NEUROLOGY

## 2021-10-26 RX ORDER — LAMOTRIGINE 200 MG/1
200 TABLET ORAL DAILY
Qty: 90 TABLET | Refills: 0 | Status: SHIPPED | OUTPATIENT
Start: 2021-11-14 | End: 2021-12-20 | Stop reason: DRUGHIGH

## 2021-10-26 RX ORDER — DEXTROAMPHETAMINE SACCHARATE, AMPHETAMINE ASPARTATE MONOHYDRATE, DEXTROAMPHETAMINE SULFATE, AMPHETAMINE SULFATE 12.5; 12.5; 12.5; 12.5 MG/1; MG/1; MG/1; MG/1
50 CAPSULE, EXTENDED RELEASE ORAL DAILY PRN
Qty: 30 CAPSULE | Refills: 0 | Status: SHIPPED | OUTPATIENT
Start: 2021-11-25 | End: 2021-12-20

## 2021-10-26 RX ORDER — DEXTROAMPHETAMINE SACCHARATE, AMPHETAMINE ASPARTATE MONOHYDRATE, DEXTROAMPHETAMINE SULFATE, AMPHETAMINE SULFATE 12.5; 12.5; 12.5; 12.5 MG/1; MG/1; MG/1; MG/1
50 CAPSULE, EXTENDED RELEASE ORAL DAILY PRN
Qty: 30 CAPSULE | Refills: 0 | Status: SHIPPED | OUTPATIENT
Start: 2021-10-26 | End: 2021-12-20

## 2021-10-26 NOTE — PROGRESS NOTES
"Tana Hernandez is a 21 year old year old who is being evaluated via a billable video visit.      How would you like to obtain your AVS? MyChart  If you are dropped from the video visit, the video invite should be resent to: Text to cell phone: see Epic  Will anyone else be joining your video visit? No       Telemedicine Visit: The patient's condition can be safely assessed and treated via synchronous audio and visual telemedicine encounter.      Reason for Telemedicine Visit: Covid-19 Pandemic    Originating Site (Patient Location): Patient's home     Distant Site (Provider Location): Provider Remote Setting    Mode of Communication:  Video Conference via [employer]    As the provider I attest to compliance with applicable laws and regulations related to telemedicine.        Outpatient Psychiatric Progress Note    Name: Tana Hernandez   : 2000                    Primary Care Provider: Gina Ferraro NP   Therapist: Seeing outside therapist    PHQ-9 scores:  PHQ-9 SCORE 2021   PHQ-9 Total Score - - -   PHQ-9 Total Score [facility] - - 10 (Moderate depression)   PHQ-9 Total Score 10 10 10       ABDIRAHMAN-7 scores:  ABDIRAHMAN-7 SCORE 2021   Total Score - - 7 (mild anxiety)   Total Score 8 7 7       Patient Identification:  Patient is a 21 year old, partnered / significant other   or   /  female  who presents for return visit with me.  Patient is currently employed part time and a student. Patient attended the phone/video session alone. Patient prefers to be called: \"Piedad\".    Interim History:  I last saw Tana Hernandez for outpatient psychiatry Consultation on 2021. During that appointment, we:      Continue fluoxetine 40 mg daily for mood, anxiety, history of PTSD, disordered eating/binge eating.     Discontinue Abilify/aripiprazole.  Already stopped.    Discontinue Adderall 3 bead extended release.    Start Vyvanse 40 " mg daily for ADHD.    Start Lamotrigine: take 25 mg daily for two weeks then increase to 50 mg daily for two weeks then increase to 100 mg until follow-up appointment.    Continue therapy as planned.  Strongest recommendation is for DBT.    10/26: Patient overall feeling a little bit more stable on lamotrigine.  Vyvanse not as helpful as her past stimulant medication and switched back.  Continues on fluoxetine.  No negative side effects.  Still interested in DBT but has not set up an intake yet.  Continues in individual psychotherapy.  No safety concerns or SI today.  No problematic drug or alcohol use.    Per [facility], Мария Lin, [facility], during today's team-based visit:  Highlights:   -Mood stabilizer has been working well, no big fluctuations in her mood  -Vyvanse didn't work as well as she had hoped (wasn't able to sit and focus on tasks, cycled through tasks, had a difficult time getting schoolwork done)  -School has been up and down  -Will be getting another job as a   -Sleep and eating has been good     Current Symptoms:  -inability to sustain attention     Worse/Better/Same:  -Improvement in mood  -Feels more stable     Side Effects:  -Maybe more fatigued     Suicidality:   -No SI     Substance Use:  -No changes     Therapist:  -Seeing therapist 1x a month, but not feeling like they are working on anything  -Interested in getting connected to DBT - will send some through [facility]    Psychiatric ROS:  Tana Hernandez reports mood has been: Slightly improved  Anxiety has been: Slightly improved  Sleep has been: Stable  Halima sxs: None  Psychosis sxs: None  ADHD/ADD sxs: Stable back on previous stimulant  PTSD sxs: None  PHQ9 and GAD7 scores were reviewed today if completed.   Medication side effects: Denies  Current stressors include: Symptoms and see HPI above   Coping mechanisms and supports include: Therapy, Family, Hobbies and Friends    Current medications include:   Current Outpatient  Medications   Medication Sig     Amphet-Dextroamphet 3-Bead ER 50 MG CP24 Take 50 mg by mouth daily as needed (ADHD symptoms)     Amphet-Dextroamphet 3-Bead ER 50 MG CP24 Take 50 mg by mouth daily     etonogestrel (IMPLANON/NEXPLANON) 68 MG IMPL 1 each by Subdermal route once      FLUoxetine (PROZAC) 40 MG capsule Take 1 capsule (40 mg) by mouth daily     lamoTRIgine (LAMICTAL) 100 MG tablet Take 1 tablet (100 mg) by mouth daily     lamoTRIgine (LAMICTAL) 25 MG tablet Take 25 mg by mouth daily for 2 weeks then 50 mg daily for 2 weeks then 100 mg.     lisdexamfetamine (VYVANSE) 40 MG capsule Take 1 capsule (40 mg) by mouth every morning     metFORMIN (GLUCOPHAGE) 500 MG tablet Take 1 tablet (500 mg) by mouth 2 times daily (with meals)     No current facility-administered medications for this visit.       The [place] Prescription Monitoring Program has been reviewed and there are no concerns about diversionary activity for controlled substances at this time.   10/21/2021 1 10/21/2021 10/22/2021 Mydayis Er 50 Mg Capsule  5.00 5 [facility] [id number] [facility] (1329) 0/0  [place]  10/01/2021 1 09/28/2021 10/04/2021 Vyvanse 40 Mg Capsule  15.00 15 [facility] [id number] [facility] (1329) 0/0  [place]  09/14/2021 1 09/13/2021 09/15/2021 Mydayis Er 50 Mg Capsule  30.00 30 [facility] [place] [id number] [facility] (1329) 0/0  [place]    Past Medical/Surgical History:  Past Medical History:   Diagnosis Date     Anxiety      Depressive disorder       has a past medical history of Anxiety and Depressive disorder.    Social History:  Reviewed. No changes to social history except as noted above in HPI.    Vital Signs:   None. This is phone/video visit.     Labs:  Most recent laboratory results reviewed and no new labs.     Review of Systems:  10 systems (general, cardiovascular, respiratory, eyes, ENT, endocrine, GI, , M/S, neurological) were reviewed. Most pertinent finding(s) is/are: denies fever, cough, headaches, shortness of breath, chest pain, N/V,  constipation/diarrhea, trouble urinating, aches and pains. The remaining systems are all unremarkable.    Mental Status Examination (limited as this is by phone/video):  Appearance: Awake, alert, appears stated age, no acute distress, well-groomed   Attitude:  cooperative, pleasant   Motor: No gross abnormalities observed via video, not formally tested   Oriented to:  person, place, time, and situation  Attention Span and Concentration:  normal  Speech:  clear, coherent, regular rate, rhythm, and volume  Language: intact  Mood:  Little more stable  Affect:  appropriate and in normal range and mood congruent  Associations:  no loose associations  Thought Process: Brief but overall logical, linear and goal oriented  Thought Content:  no evidence of suicidal ideation or homicidal ideation, no evidence of psychotic thought, no auditory hallucinations present and no visual hallucinations present  Recent and Remote Memory:  Intact to interview. Not formally assessed. No amnesia.  Fund of Knowledge: appropriate  Insight:  good  Judgment:  intact, adequate for safety  Impulse Control:  intact    Suicide Risk Assessment:  Today Tana Hernandez reports no suicidal ideation. Based on all available evidence including the factors cited above, Tana Hernandez does not appear to be at imminent risk for self-harm, does not meet criteria for a 72-hr hold, and therefore remains appropriate for ongoing outpatient level of care.  A thorough assessment of risk factors related to suicide and self-harm have been reviewed and are noted above. The patient convincingly denies suicidality on several occasions. Local community safety resources printed and reviewed for patient to use if needed. There was no deceit detected, and the patient presented in a manner that was believable.     DSM5 Diagnosis:  Attention-Deficit/Hyperactivity Disorder  314.01 (F90.9) Unspecified Attention -Deficit / Hyperactivity Disorder  301.83 (F60.3) Borderline  Personality Disorder   Bipolar 2 disorder  Mood disorder, unspecified    Medical comorbidities include:   Patient Active Problem List    Diagnosis Date Noted     Diabetes mellitus, type 2 (H) 09/13/2021     Priority: Medium     Vitamin deficiency 02/05/2019     Priority: Medium     Class 3 severe obesity due to excess calories without serious comorbidity with body mass index (BMI) of 45.0 to 49.9 in adult (H) 01/04/2019     Priority: Medium     Attention deficit hyperactivity disorder (ADHD), unspecified ADHD type 09/29/2017     Priority: Medium     Major depression in complete remission (H) 10/26/2016     Priority: Medium       Psychosocial & Contextual Factors: see HPI above    Assessment:  Patient overall with some improvement on lamotrigine.  Feels more stable.  Did not do as well on Vyvanse as her previous stimulant.  Went back to Mydayis.  Overall this has been a good medication for her.  We will continue to optimize therapy with lamotrigine for mood stabilization, bipolar disorder, borderline personality disorder.  She is still hopeful to get into a DBT program.  This would be strongly recommended.  No acute safety concerns.  Tolerating medications well with no negative side effects.  No rash on lamotrigine.  No problematic drug or alcohol use.    Medication side effects and alternatives were reviewed. Health promotion activities recommended and reviewed today. All questions addressed. Education and counseling completed regarding risks and benefits of medications and psychotherapy options. Recommend therapy for additional support.     Treatment Plan:    Continue Mydayis ER 50 mg daily as needed for ADHD.     Continue fluoxetine 40 mg daily for mood, anxiety.     Increase lamotrigine to 150 mg daily after 10 days on 100 mg. Can increase to 200 mg daily after 10 days on 150 mg daily. For BPD and Bipolar Disorder.      Continue all other cares per primary care provider.     Continue all other medications as  reviewed per electronic medical record today.     Safety plan reviewed. To the Emergency Department as needed or call after hours crisis line at [phone] or [phone]. [place] Crisis Text [place]. Text [place] to 991964 or Suicide LifeLine Chat: suicidepreventionlifeline.org/chat    Continue therapy as planned. Strongest recommendation is for DBT.     Schedule an appointment with me in 6 weeks or sooner as needed. Call [facility] at [phone] to schedule.    Follow up with primary care provider as planned or for acute medical concerns.    Call the psychiatric nurse line with medication questions or concerns at [phone].    MyChart may be used to communicate with your provider, but this is not intended to be used for emergencies.    Have previously discussed Lamictal (lamotrigine) can cause serious rashes including Patino-Eric syndrome which may requiring hospitalization and discontinuation of treatment. If any signs of a rash occur, please see your Primary Care Provider or a dermatologist immediately.     Discussed risks of stimulant medication including, but not limited to, decreased appetite, risk of tics (and that they may be lasting), trouble sleeping, cardiac risks such as increased heart rate and blood pressure, and rare risk of sudden cardiac death.  Also risk of addiction/tolerance/dependence.    Administrative Billing:   Phone Call/Video Duration: 6 Minutes  Start: 3:33p  Stop: 3:39p    Time spent with patient was 6 minutes and greater than 50% of time or 4 minutes was spent in counseling and coordination of care regarding above diagnoses and treatment plan. Patient with multiple psychiatric diagnoses and multiple medication changes adding to higher complexity of care.    Patient Status:  Patient will continue to be seen for ongoing consultation and stabilization.    Signed:   Patty Franco DO  [facility] Psychiatry    Disclaimer: This note consists of symbols derived from  keyboarding, dictation and/or voice recognition software. As a result, there may be errors in the script that have gone undetected. Please consider this when interpreting information found in this chart.

## 2021-10-26 NOTE — PATIENT INSTRUCTIONS
Treatment Plan:    Continue Mydayis ER 50 mg daily as needed for ADHD.     Continue fluoxetine 40 mg daily for mood, anxiety.     Increase lamotrigine to 150 mg daily after 10 days on 100 mg. Can increase to 200 mg daily after 10 days on 150 mg daily. For BPD and Bipolar Disorder.      Continue all other cares per primary care provider.     Continue all other medications as reviewed per electronic medical record today.     Safety plan reviewed. To the Emergency Department as needed or call after hours crisis line at [phone] or [phone]. [place] Crisis Text [place]. Text [place] to 302664 or Suicide LifeLine Chat: suicidepreventionlifeline.org/chat    Continue therapy as planned. Strongest recommendation is for DBT.     Schedule an appointment with me in 6 weeks or sooner as needed. Call [facility] at [phone] to schedule.    Follow up with primary care provider as planned or for acute medical concerns.    Call the psychiatric nurse line with medication questions or concerns at [phone].    CaterCowt may be used to communicate with your provider, but this is not intended to be used for emergencies.    Have previously discussed Lamictal (lamotrigine) can cause serious rashes including Patino-Eric syndrome which may requiring hospitalization and discontinuation of treatment. If any signs of a rash occur, please see your Primary Care Provider or a dermatologist immediately.     Discussed risks of stimulant medication including, but not limited to, decreased appetite, risk of tics (and that they may be lasting), trouble sleeping, cardiac risks such as increased heart rate and blood pressure, and rare risk of sudden cardiac death.  Also risk of addiction/tolerance/dependence.

## 2021-10-26 NOTE — PROGRESS NOTES
Collaborative Care Psychiatry Service (CCPS)  October 26, 2021    Behavioral Health Clinician Progress Note    Patient Name: Tana Hernandez      Telemedicine Visit: The patient's condition can be safely assessed and treated via synchronous audio and visual telemedicine encounter.      Reason for Telemedicine Visit: Services only offered telehealth    Originating Site (Patient Location): Patient's home    Distant Site (Provider Location): Provider Remote Setting- Home Office    Consent:  The patient/guardian has verbally consented to: the potential risks and benefits of telemedicine (video visit) versus in person care; bill my insurance or make self-payment for services provided; and responsibility for payment of non-covered services.     Mode of Communication:  Video Conference via [employer]    As the provider I attest to compliance with applicable laws and regulations related to telemedicine.         Service Type:  Individual      Service Location:   Face to Face in Home / Community via [employer]     Session Start Time: 3:00pm  Session End Time: 3:18pm      Session Length: 16 - 37      Attendees: Patient    Visit Activities (Refresh list every visit): [facility] Only    Diagnostic Assessment Date: 9/28/2021 by Lang Martin  See Flowsheets for today's PHQ-9 and ABDIRAHMAN-7 results  Previous PHQ-9:   PHQ-9 SCORE 9/13/2021 9/28/2021 9/28/2021   PHQ-9 Total Score - - -   PHQ-9 Total Score MyChart - - 10 (Moderate depression)   PHQ-9 Total Score 10 10 10       Previous ABDIRAHMAN-7:   ABDIRAHMAN-7 SCORE 9/13/2021 9/28/2021 9/28/2021   Total Score - - 7 (mild anxiety)   Total Score 8 7 7       WHODAS  WHODAS 2.0 Total Score 9/28/2021 9/28/2021   Total Score 19 19   Total Score MyChart - 19        CAGE  CAGE-AID Flowsheet 9/28/2021 9/28/2021   Have you ever felt you should Cut down on your drinking or drug use? 1 1   Have people Annoyed you by criticizing your drinking or drug use? 0 0   Have you ever felt bad or Guilty about your drinking or  drug use? 0 0   Have you ever had a drink or used drugs first thing in the morning to steady your nerves or to get rid of a hangover? (Eye opener) 0 0   CAGE-AID SCORE 1 1   1. Have you felt you ought to cut down on your drinking or drug use? - Yes   2. Have people annoyed you by criticizing your drinking or drug use? - No   3. Have you felt bad or guilty about your drinking or drug use? - No   4. Have you ever had a drink or used drugs first thing in the morning to steady your nerves or to get rid of a hangover (eye opener)? - No   CAGE-AID SCORE - 1 (A total score of 2 or greater is considered clinically significant)         DATA  Extended Session (60+ minutes): No  Interactive Complexity: No  Crisis: No    Medication Compliance:  Yes      Chemical Use Review:   Substance Use: Chemical use reviewed, no active concerns identified      Tobacco Use: No current tobacco use.      Current Stressors / Issues:  Patient reported that the mood stabilizer has been working well and she has not noticed any big fluctuations in her mood. The vyvanse did not work as well as she had hoped. She noticed that she was not able to sit and focus on tasks, felt she cycled through tasks/activities, and had a difficult time getting schoolwork done. She shared that she has been more fatigued in general, but feels more stable. Patient got another job as a , which she is looking forward to. She is continuing to try to get adequate sleep and eat healthy. Patient denied SI and reported no changes to her SI. She has been seeing her therapist monthly, but feels like they are not working on anything. She is interested in the DBT recommendations, so [facility] will send some DBT resources through [employer].     Progress on Treatment Objective(s) / Homework:  Satisfactory progress - ACTION (Actively working towards change); Intervened by reinforcing change plan / affirming steps taken    Motivational Interviewing    MI Intervention: Expressed  Empathy/Understanding, Supported Autonomy, Collaboration, Evocation, Permission to raise concern or advise, Open-ended questions and Reflections: simple and complex     Change Talk Expressed by the Patient: Desire to change Ability to change Reasons to change Need to change Committment to change    Provider Response to Change Talk: E - Evoked more info from patient about behavior change, A - Affirmed patient's thoughts, decisions, or attempts at behavior change, R - Reflected patient's change talk and S - Summarized patient's change talk statements    Also provided psychoeducation about behavioral health condition, symptoms, and treatment options      Review of Symptoms per patient report:  Depression: Lack of interest, Change in energy level, Difficulties concentrating, Change in appetite, Feelings of hopelessness, Feelings of helplessness, Low self-worth, Irritability, Feeling sad, down, or depressed, Withdrawn, Poor hygeine and Frequent crying  Halima:  Elevated mood, Grandiosity, Racing thoughts, Increased activity, Pressured speech, Restlessness, Distractibility and Impulsiveness  Psychosis: No Symptoms  Anxiety: Excessive worry, Nervousness, Physical complaints, such as headaches, stomachaches, muscle tension, Psychomotor agitation, Ruminations, Poor concentration and Irritability  Panic:  Palpitations, Tremors, Shortness of breath and Tingling  Post Traumatic Stress Disorder:  No Symptoms   Eating Disorder: Restriction, Binging, Purging, Laxative use and Weight change  ADD / ADHD:  No symptoms  Conduct Disorder: No symptoms  Autism Spectrum Disorder: No symptoms  Obsessive Compulsive Disorder: No Symptoms     Changes in Health Issues:   None reported    Assessment: Current Emotional / Mental Status (status of significant symptoms):  Risk status (Self / Other harm or suicidal ideation)  Patient denies a history of suicidal ideation, suicide attempts, self-injurious behavior, homicidal ideation, homicidal  behavior and and other safety concerns  Patient denies current fears or concerns for personal safety.  Patient denies current or recent suicidal ideation or behaviors.  Patient denies current or recent homicidal ideation or behaviors.  Patient denies current or recent self injurious behavior or ideation.  Patient denies other safety concerns.  A safety and risk management plan has not been developed at this time, however patient was encouraged to call [place] [phone] should there be a change in any of these risk factors.    Appearance:   Appropriate   Eye Contact:   Good   Psychomotor Behavior: Normal   Attitude:   Cooperative   Orientation:   All  Speech   Rate / Production: Normal    Volume:  Normal   Mood:    Normal  Affect:    Appropriate   Thought Content:  Clear   Thought Form:  Coherent  Logical   Insight:    Good     Diagnoses:  1. Attention deficit hyperactivity disorder (ADHD), unspecified ADHD type    2. Current moderate episode of major depressive disorder, unspecified whether recurrent (H)    3. Anxiety disorder, unspecified type        Collateral Reports Completed:  Communicated with: Dr. Franco    Plan: (Homework, other):  Patient was given information about behavioral services and encouraged to schedule a follow up appointment with the clinic [facility]  She was also given information about mental health symptoms and treatment options .  CD Recommendations: No indications of CD issues. DBT resources sent via [employer]. YVETTE Jerez, [facility]      YVETTE Jerez  October 26, 2021

## 2021-11-16 DIAGNOSIS — E11.65 TYPE 2 DIABETES MELLITUS WITH HYPERGLYCEMIA, WITHOUT LONG-TERM CURRENT USE OF INSULIN (H): ICD-10-CM

## 2021-11-17 NOTE — TELEPHONE ENCOUNTER
Routing refill request to provider for review/approval because:  Medication has end date, do you wish to continue?    Ambar Gomes RN

## 2021-12-20 ENCOUNTER — VIRTUAL VISIT (OUTPATIENT)
Dept: PSYCHIATRY | Facility: CLINIC | Age: 21
End: 2021-12-20
Payer: COMMERCIAL

## 2021-12-20 ENCOUNTER — VIRTUAL VISIT (OUTPATIENT)
Dept: PSYCHOLOGY | Facility: CLINIC | Age: 21
End: 2021-12-20
Payer: COMMERCIAL

## 2021-12-20 DIAGNOSIS — F90.9 ATTENTION DEFICIT HYPERACTIVITY DISORDER (ADHD), UNSPECIFIED ADHD TYPE: ICD-10-CM

## 2021-12-20 DIAGNOSIS — F60.3 BORDERLINE PERSONALITY DISORDER (H): ICD-10-CM

## 2021-12-20 DIAGNOSIS — F41.9 ANXIETY: ICD-10-CM

## 2021-12-20 DIAGNOSIS — F32.1 CURRENT MODERATE EPISODE OF MAJOR DEPRESSIVE DISORDER, UNSPECIFIED WHETHER RECURRENT (H): Primary | ICD-10-CM

## 2021-12-20 DIAGNOSIS — F39 MOOD DISORDER (H): ICD-10-CM

## 2021-12-20 PROCEDURE — 90832 PSYTX W PT 30 MINUTES: CPT | Mod: GT | Performed by: PSYCHOLOGIST

## 2021-12-20 PROCEDURE — 99214 OFFICE O/P EST MOD 30 MIN: CPT | Mod: GT | Performed by: PSYCHIATRY & NEUROLOGY

## 2021-12-20 RX ORDER — DEXTROAMPHETAMINE SACCHARATE, AMPHETAMINE ASPARTATE MONOHYDRATE, DEXTROAMPHETAMINE SULFATE, AMPHETAMINE SULFATE 12.5; 12.5; 12.5; 12.5 MG/1; MG/1; MG/1; MG/1
50 CAPSULE, EXTENDED RELEASE ORAL DAILY PRN
Qty: 30 CAPSULE | Refills: 0 | Status: SHIPPED | OUTPATIENT
Start: 2021-12-20 | End: 2022-01-28

## 2021-12-20 RX ORDER — LAMOTRIGINE 100 MG/1
100 TABLET ORAL DAILY
Qty: 90 TABLET | Refills: 0 | Status: SHIPPED | OUTPATIENT
Start: 2021-12-20 | End: 2022-04-05

## 2021-12-20 RX ORDER — DEXTROAMPHETAMINE SACCHARATE, AMPHETAMINE ASPARTATE MONOHYDRATE, DEXTROAMPHETAMINE SULFATE, AMPHETAMINE SULFATE 12.5; 12.5; 12.5; 12.5 MG/1; MG/1; MG/1; MG/1
50 CAPSULE, EXTENDED RELEASE ORAL DAILY PRN
Qty: 30 CAPSULE | Refills: 0 | Status: SHIPPED | OUTPATIENT
Start: 2022-01-19 | End: 2022-04-05

## 2021-12-20 RX ORDER — FLUOXETINE 40 MG/1
40 CAPSULE ORAL DAILY
Qty: 90 CAPSULE | Refills: 0 | Status: SHIPPED | OUTPATIENT
Start: 2021-12-20 | End: 2022-06-10

## 2021-12-20 ASSESSMENT — ANXIETY QUESTIONNAIRES
6. BECOMING EASILY ANNOYED OR IRRITABLE: NEARLY EVERY DAY
GAD7 TOTAL SCORE: 13
4. TROUBLE RELAXING: MORE THAN HALF THE DAYS
GAD7 TOTAL SCORE: 13
7. FEELING AFRAID AS IF SOMETHING AWFUL MIGHT HAPPEN: MORE THAN HALF THE DAYS
2. NOT BEING ABLE TO STOP OR CONTROL WORRYING: MORE THAN HALF THE DAYS
5. BEING SO RESTLESS THAT IT IS HARD TO SIT STILL: NOT AT ALL
1. FEELING NERVOUS, ANXIOUS, OR ON EDGE: MORE THAN HALF THE DAYS
3. WORRYING TOO MUCH ABOUT DIFFERENT THINGS: MORE THAN HALF THE DAYS
GAD7 TOTAL SCORE: 13
7. FEELING AFRAID AS IF SOMETHING AWFUL MIGHT HAPPEN: MORE THAN HALF THE DAYS

## 2021-12-20 ASSESSMENT — PATIENT HEALTH QUESTIONNAIRE - PHQ9
10. IF YOU CHECKED OFF ANY PROBLEMS, HOW DIFFICULT HAVE THESE PROBLEMS MADE IT FOR YOU TO DO YOUR WORK, TAKE CARE OF THINGS AT HOME, OR GET ALONG WITH OTHER PEOPLE: EXTREMELY DIFFICULT
SUM OF ALL RESPONSES TO PHQ QUESTIONS 1-9: 17
10. IF YOU CHECKED OFF ANY PROBLEMS, HOW DIFFICULT HAVE THESE PROBLEMS MADE IT FOR YOU TO DO YOUR WORK, TAKE CARE OF THINGS AT HOME, OR GET ALONG WITH OTHER PEOPLE: EXTREMELY DIFFICULT
SUM OF ALL RESPONSES TO PHQ QUESTIONS 1-9: 17

## 2021-12-20 NOTE — Clinical Note
Please call this patient to get them scheduled for a follow-up visit in 4-6 weeks. Please schedule with me and the [facility]. Thanks!

## 2021-12-20 NOTE — PROGRESS NOTES
Collaborative Care Psychiatry Service (CCPS)  December 20, 2021      Behavioral Health Clinician Progress Note    Patient Name: Tana Hernandez      Telemedicine Visit: The patient's condition can be safely assessed and treated via synchronous audio and visual telemedicine encounter.      Reason for Telemedicine Visit: Services only offered telehealth    Originating Site (Patient Location): Patient's home    Distant Site (Provider Location): Provider Remote Setting- Home Office    Consent:  The patient/guardian has verbally consented to: the potential risks and benefits of telemedicine (video visit) versus in person care; bill my insurance or make self-payment for services provided; and responsibility for payment of non-covered services.     Mode of Communication:  Video Conference via [employer]    As the provider I attest to compliance with applicable laws and regulations related to telemedicine.         Service Type:  Individual      Service Location:   Face to Face in Home / Community via [employer]     Session Start Time: 02:30pm  Session End Time: 02:50pm      Session Length: 16 - 37      Attendees: Patient    Visit Activities (Refresh list every visit): [facility] Only    Diagnostic Assessment Date: 9/28/2021 by Lang Martin  See Flowsheets for today's PHQ-9 and ABDIRAHMAN-7 results  Previous PHQ-9:   PHQ-9 SCORE 9/28/2021 12/20/2021 12/20/2021   PHQ-9 Total Score - - -   PHQ-9 Total Score MyChart 10 (Moderate depression) - 17 (Moderately severe depression)   PHQ-9 Total Score 10 17 17       Previous ABDIRAHMAN-7:   ABDIRAHMAN-7 SCORE 9/13/2021 9/28/2021 9/28/2021   Total Score - - 7 (mild anxiety)   Total Score 8 7 7       WHODAS  WHODAS 2.0 Total Score 9/28/2021 9/28/2021   Total Score 19 19   Total Score MyChart - 19        CAGE  CAGE-AID Flowsheet 9/28/2021 9/28/2021   Have you ever felt you should Cut down on your drinking or drug use? 1 1   Have people Annoyed you by criticizing your drinking or drug use? 0 0   Have you ever  "felt bad or Guilty about your drinking or drug use? 0 0   Have you ever had a drink or used drugs first thing in the morning to steady your nerves or to get rid of a hangover? (Eye opener) 0 0   CAGE-AID SCORE 1 1   1. Have you felt you ought to cut down on your drinking or drug use? - Yes   2. Have people annoyed you by criticizing your drinking or drug use? - No   3. Have you felt bad or guilty about your drinking or drug use? - No   4. Have you ever had a drink or used drugs first thing in the morning to steady your nerves or to get rid of a hangover (eye opener)? - No   CAGE-AID SCORE - 1 (A total score of 2 or greater is considered clinically significant)         DATA  Extended Session (60+ minutes): No  Interactive Complexity: No  Crisis: No    Medication Compliance:  Yes      Chemical Use Review:   Substance Use: Chemical use reviewed, no active concerns identified      Tobacco Use: No current tobacco use.      Current Stressors / Issues:  Reported that she has not been doing well. She started the increased dose, did not feel any better, her focus was worse, and stopped taking lamotrigine, fluoxetine, and vyvanse after 2 weeks. She also dropped out of school. A few weeks after that she \"really crashed\", dropped out of school, and was only doing school. She became depressed and questioning whether she wants to do this type of work or not (). Very depressed, tired all the time \"I could sleep all day.\" Has not started DBT; low motivation. Stopped meeting with her other therapist as it was not very helpful. Reinforced the need for treatment (DBT) to complement the benefits of medications and learn coping skills.      Progress on Treatment Objective(s) / Homework:  Worsening - PREPARATION (Decided to change - considering how); Intervened by negotiating a change plan and determining options / strategies for behavior change, identifying triggers, exploring social supports, and working towards setting a date " to begin behavior change    Motivational Interviewing    MI Intervention: Expressed Empathy/Understanding, Supported Autonomy, Collaboration, Evocation, Permission to raise concern or advise, Open-ended questions and Reflections: simple and complex     Change Talk Expressed by the Patient: Desire to change Ability to change Reasons to change Need to change Committment to change    Provider Response to Change Talk: E - Evoked more info from patient about behavior change, A - Affirmed patient's thoughts, decisions, or attempts at behavior change, R - Reflected patient's change talk and S - Summarized patient's change talk statements    Also provided psychoeducation about behavioral health condition, symptoms, and treatment options      Review of Symptoms per patient report:  Depression: Lack of interest, Change in energy level, Difficulties concentrating, Change in appetite, Feelings of hopelessness, Feelings of helplessness, Low self-worth, Irritability, Feeling sad, down, or depressed, Withdrawn, Poor hygeine and Frequent crying  Halima:  Elevated mood, Grandiosity, Racing thoughts, Increased activity, Pressured speech, Restlessness, Distractibility and Impulsiveness  Psychosis: No Symptoms  Anxiety: Excessive worry, Nervousness, Physical complaints, such as headaches, stomachaches, muscle tension, Psychomotor agitation, Ruminations, Poor concentration and Irritability  Panic:  Palpitations, Tremors, Shortness of breath and Tingling  Post Traumatic Stress Disorder:  No Symptoms   Eating Disorder: Restriction, Binging, Purging, Laxative use and Weight change  ADD / ADHD:  No symptoms  Conduct Disorder: No symptoms  Autism Spectrum Disorder: No symptoms  Obsessive Compulsive Disorder: No Symptoms     Changes in Health Issues:   None reported    Assessment: Current Emotional / Mental Status (status of significant symptoms):  Risk status (Self / Other harm or suicidal ideation)  Patient denies a history of suicidal  ideation, suicide attempts, self-injurious behavior, homicidal ideation, homicidal behavior and and other safety concerns  Patient denies current fears or concerns for personal safety.  Patient denies current or recent suicidal ideation or behaviors.  Patient denies current or recent homicidal ideation or behaviors.  Patient denies current or recent self injurious behavior or ideation.  Patient denies other safety concerns.  A safety and risk management plan has not been developed at this time, however patient was encouraged to call [place] [phone] should there be a change in any of these risk factors.    Appearance:   Appropriate   Eye Contact:   Good   Psychomotor Behavior: Normal   Attitude:   Cooperative   Orientation:   All  Speech   Rate / Production: Normal    Volume:  Normal   Mood:    Depressed   Affect:    Appropriate   Thought Content:  Clear   Thought Form:  Coherent  Logical   Insight:    Fair     Diagnoses:  1. Current moderate episode of major depressive disorder, unspecified whether recurrent (H)    2. Attention deficit hyperactivity disorder (ADHD), unspecified ADHD type    3. Anxiety    4. Borderline personality disorder (H)        Collateral Reports Completed:  Communicated with: Dr. Franco    Plan: (Homework, other):  Patient was given information about behavioral services and encouraged to schedule a follow [facility]  She was also given information about mental health symptoms and treatment options .  CD Recommendations: No indications of CD issues.     Lang Martin PsyD, LP      Moises Martin PsyD  December 20, 2021

## 2021-12-20 NOTE — PROGRESS NOTES
"Tana Hernandez is a 21 year old year old who is being evaluated via a billable video visit.      How would you like to obtain your AVS? MyChart  If you are dropped from the video visit, the video invite should be resent to: Text to cell phone: see Epic  Will anyone else be joining your video visit? No       Telemedicine Visit: The patient's condition can be safely assessed and treated via synchronous audio and visual telemedicine encounter.      Reason for Telemedicine Visit: Covid-19 Pandemic    Originating Site (Patient Location): Patient's home     Distant Site (Provider Location): Provider Remote Setting    Mode of Communication:  Video Conference via [employer]    As the provider I attest to compliance with applicable laws and regulations related to telemedicine.        Outpatient Psychiatric Progress Note    Name: Tana Hernandez   : 2000                    Primary Care Provider: Gina Ferraro NP   Therapist: Seeing outside therapist    PHQ-9 scores:  PHQ-9 SCORE 2021   PHQ-9 Total Score - - -   PHQ-9 Total Score [facility] 10 (Moderate depression) - 17 (Moderately severe depression)   PHQ-9 Total Score 10 17 17       ABDIRAHMAN-7 scores:  ABDIRAHMAN-7 SCORE 2021   Total Score - 7 (mild anxiety) 13 (moderate anxiety)   Total Score 7 7 13       Patient Identification:  Patient is a 21 year old, partnered / significant other   or   /  female  who presents for return visit with me.  Patient is currently employed part time and a student. Patient attended the phone/video session alone. Patient prefers to be called: \"Piedad\".    Interim History:  I last saw Tana Hernandez for outpatient psychiatry return visit on 10/26/2021. During that appointment, we:      Continue fluoxetine 40 mg daily for mood, anxiety, history of PTSD, disordered eating/binge eating.     Discontinue Abilify/aripiprazole.  Already " "stopped.    Discontinue Adderall 3 bead extended release.    Start Vyvanse 40 mg daily for ADHD.    Start Lamotrigine: take 25 mg daily for two weeks then increase to 50 mg daily for two weeks then increase to 100 mg until follow-up appointment.    Continue therapy as planned.  Strongest recommendation is for DBT.    12/20: Patient has been having a very difficult time lately.  Increase psychosocial stressors at home.  Challenging relationship with her father who is not doing well himself.  This is caused some tension in the home since patient and her brother live with their father.  Patient and her brother have taken on most of the financial burden in their home.  Patient spiraled since anxiety became quite high and mood got quite low.  Ended up having to drop out of school due to everything going on.  Also went off of her medications.  Is not currently in DBT.  No acute safety concerns today.  Continues to have intermittent passive suicidal ideation but no plan or intent.  No problematic drug or alcohol use.    Per [facility] [place], Dr. Lang Martin, during today's team-based visit:  Reported that she has not been doing well. She started the increased dose, did not feel any better, her focus was worse, and stopped taking lamotrigine, fluoxetine, and vyvanse after 2 weeks. She also dropped out of school. A few weeks after that she \"really crashed\", dropped out of school, and was only doing school. 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 Very depressed, tired all the time \"I could sleep all day.\" Has not started DBT; low motivation. Stopped meeting with her other therapist as it was not very helpful. Reinforced the need for treatment (DBT) to complement the benefits of medications and learn coping skills.    Past medication trials include but are not limited to:   Abilify -increased blood sugars  Fluoxetine -stopped by patient  Adderall 3 bead " -current  Citalopram  Wellbutrin - hives  Hydroxyzine  Likely others she cannot remember  Lamotrigine - stopped by patient  vyvanse - not helpful    Psychiatric ROS:  Tana Hernandez reports mood has been: Worse  Anxiety has been: Worse  Sleep has been: Up-and-down  Halima sxs: None  Psychosis sxs: None  ADHD/ADD sxs: Stable back on previous stimulant  PTSD sxs: None  PHQ9 and GAD7 scores were reviewed today if completed.   Medication side effects: Denies  Current stressors include: Symptoms and see HPI above   Coping mechanisms and supports include: Therapy, Family, Hobbies and Friends    Current medications include:   Current Outpatient Medications   Medication Sig     Amphet-Dextroamphet 3-Bead ER 50 MG CP24 Take 50 mg by mouth daily as needed (ADHD symptoms)     Amphet-Dextroamphet 3-Bead ER 50 MG CP24 Take 50 mg by mouth daily as needed (ADHD)     etonogestrel (IMPLANON/NEXPLANON) 68 MG IMPL 1 each by Subdermal route once      FLUoxetine (PROZAC) 40 MG capsule Take 1 capsule (40 mg) by mouth daily     lamoTRIgine (LAMICTAL) 100 MG tablet Take 1 tablet (100 mg) by mouth daily     lamoTRIgine (LAMICTAL) 200 MG tablet Take 1 tablet (200 mg) by mouth daily     metFORMIN (GLUCOPHAGE) 500 MG tablet TAKE 1 TABLET(500 MG) BY MOUTH TWICE DAILY WITH MEALS     No current facility-administered medications for this visit.       The [place] Prescription Monitoring Program has been reviewed and there are no concerns about diversionary activity for controlled substances at this time.   10/26/2021 10/26/2021 10/28/2021 1   Mydayis Er 50 Mg Capsule  30.00 30 Al Bau [id number] [facility] (1329) 0/0  [place]  10/21/2021 10/21/2021 10/22/2021 1   Mydayis Er 50 Mg Capsule  5.00 5 Al Bau [id number] [facility] (1329) 0/0  [place]  10/01/2021 09/28/2021 10/04/2021 1   Vyvanse 40 Mg Capsule  15.00 15 Al Bau [id number] [facility] (1329) 0/0  [place]  09/14/2021 09/13/2021 09/15/2021 1   Mydayis Er 50 Mg Capsule  30.00 30 [place] [id number] [facility]  (4314) 0/0  [place]      Past Medical/Surgical History:  Past Medical History:   Diagnosis Date     Anxiety      Depressive disorder       has a past medical history of Anxiety and Depressive disorder.    Social History:  Reviewed. No changes to social history except as noted above in HPI.    Vital Signs:   None. This is phone/video visit.     Labs:  Most recent laboratory results reviewed and no new labs.     Review of Systems:  10 systems (general, cardiovascular, respiratory, eyes, ENT, endocrine, GI, , M/S, neurological) were reviewed. Most pertinent finding(s) is/are: denies fever, cough, headaches, shortness of breath, chest pain, N/V, constipation/diarrhea, trouble urinating, aches and pains. The remaining systems are all unremarkable.    Mental Status Examination (limited as this is by phone/video):  Appearance: Awake, alert, appears stated age, no acute distress, well-groomed   Attitude:  cooperative, pleasant   Motor: No gross abnormalities observed via video, not formally tested   Oriented to:  person, place, time, and situation  Attention Span and Concentration:  normal  Speech:  clear, coherent, regular rate, rhythm, and volume  Language: intact  Mood: Worse, more depressed and anxious  Affect: Subdued, tearful  Associations:  no loose associations  Thought Process:  overall logical, linear and goal oriented  Thought Content:  no evidence of suicidal ideation or homicidal ideation, no evidence of psychotic thought, no auditory hallucinations present and no visual hallucinations present  Recent and Remote Memory:  Intact to interview. Not formally assessed. No amnesia.  Fund of Knowledge: appropriate  Insight:  good  Judgment:  intact, adequate for safety  Impulse Control:  intact    Suicide Risk Assessment:  Today Tana Hernandez reports no acute suicidal ideation. Based on all available evidence including the factors cited above, Tana Hernandez does not appear to be at imminent risk for  self-harm, does not meet criteria for a 72-hr hold, and therefore remains appropriate for ongoing outpatient level of care.  A thorough assessment of risk factors related to suicide and self-harm have been reviewed and are noted above. The patient convincingly denies suicidality on several occasions. Local community safety resources printed and reviewed for patient to use if needed. There was no deceit detected, and the patient presented in a manner that was believable.     DSM5 Diagnosis:  Attention-Deficit/Hyperactivity Disorder  314.01 (F90.9) Unspecified Attention -Deficit / Hyperactivity Disorder  301.83 (F60.3) Borderline Personality Disorder   Bipolar 2 disorder  Mood disorder, unspecified  Borderline personality disorder    Medical comorbidities include:   Patient Active Problem List    Diagnosis Date Noted     Diabetes mellitus, type 2 (H) 09/13/2021     Priority: Medium     Vitamin deficiency 02/05/2019     Priority: Medium     Class 3 severe obesity due to excess calories without serious comorbidity with body mass index (BMI) of 45.0 to 49.9 in adult (H) 01/04/2019     Priority: Medium     Attention deficit hyperactivity disorder (ADHD), unspecified ADHD type 09/29/2017     Priority: Medium     Major depression in complete remission (H) 10/26/2016     Priority: Medium       Psychosocial & Contextual Factors: see HPI above    Assessment:  Intake, 9/28/2021:   Tana Hernandez is a 21-year-old female with past psychiatric history including diagnoses of ADHD and bipolar disorder who presents today for psychiatric evaluation.  After chart review and interview with the patient her bipolar disorder is a little less clear.  She does meet nearly all the criteria for borderline personality disorder (we went through the criteria today).  Discussed she does meet criteria for diagnosis of borderline personality disorder.  Also discussed that borderline and bipolar disorder are highly comorbid.  Could have both, or,  the borderline diagnosis might be the root of a lot of her mental health struggles.  Discussed recommendation for DBT.  She did do well on Abilify.  Abilify can be helpful as a mood stabilizer in both bipolar and borderline.  Unfortunately it was increasing her blood sugars.  I recommend a trial with lamotrigine to see if that might be better tolerated and still helpful.  She is agreeable to a trial.  We will continue fluoxetine for now but did discuss risk of precipitating hypomanic/manic episodes and bipolar disorder.  I do think she needs to further address her disordered eating.  She does states she works on with her therapist.  Could always consider more intensive therapy at [facility], or [facility].  She is agreeable to a trial with Vyvanse and move her Adderall since she does not feel her Adderall has been as helpful as maybe an ADHD medication should be.  Discussed risks and benefits of stimulants.  Strongly recommend she continually some form of therapy, if not DBT.  No acute safety concerns today.  No problematic drug or alcohol use.    10/26/2021:  Patient overall with some improvement on lamotrigine.  Feels more stable.  Did not do as well on Vyvanse as her previous stimulant.  Went back to Mydayis.  Overall this has been a good medication for her.  We will continue to optimize therapy with lamotrigine for mood stabilization, bipolar disorder, borderline personality disorder.  She is still hopeful to get into a DBT program.  This would be strongly recommended.  No acute safety concerns.  Tolerating medications well with no negative side effects.  No rash on lamotrigine.  No problematic drug or alcohol use.    12/20/2021:   Patient fortunately not doing very well.  Her and her brother have been struggling to meet ends meet since her father has been unable to contribute to the financial burden.  Father not doing well with his own mental health.  Discussed getting into DBT for additional  support during this difficult time.  DBT would also help her develop necessary coping skills and strategies to better handle everything going on.  Discussed she would definitely benefit from having a  available in a DBT program to contact when necessary.  We will restart her lamotrigine.  She will continue fluoxetine and her Mydayis.  I am agreeable to filling out any form she might need due to dropping out this semester and/or needing to go back next semester.  No acute safety concerns today.  No problematic drug or alcohol use.    Medication side effects and alternatives were reviewed. Health promotion activities recommended and reviewed today. All questions addressed. Education and counseling completed regarding risks and benefits of medications and psychotherapy options. Recommend therapy for additional support.     Treatment Plan:    Continue Mydayis ER 50 mg daily as needed for ADHD.     Continue fluoxetine 40 mg daily for mood, anxiety.     Restart lamotrigine: take 50 mg daily for 2 weeks then increase to 100 mg daily for BPD and Bipolar Disorder.      My direct HIPPA compliant fax number for forms (or upload to [employer]): [phone]    Continue all other cares per primary care provider.     Continue all other medications as reviewed per electronic medical record today.     Safety plan reviewed. To the Emergency Department as needed or call after hours crisis line at [phone] or [phone]. [place] Crisis Text [place]. Text [place] to 065795 or Suicide LifeLine Chat: suicidepreventionlifeline.org/chat    Strongest recommendation is for DBT.     Schedule an appointment with me in 4-6 weeks or sooner as needed. Call [facility] at [phone] to schedule.    Follow up with primary care provider as planned or for acute medical concerns.    Call the psychiatric nurse line with medication questions or concerns at [phone].    [employer] may be used to communicate with your provider, but  this is not intended to be used for emergencies.    Have previously discussed Lamictal (lamotrigine) can cause serious rashes including Patino-Eric syndrome which may requiring hospitalization and discontinuation of treatment. If any signs of a rash occur, please see your Primary Care Provider or a dermatologist immediately.     Discussed risks of stimulant medication including, but not limited to, decreased appetite, risk of tics (and that they may be lasting), trouble sleeping, cardiac risks such as increased heart rate and blood pressure, and rare risk of sudden cardiac death.  Also risk of addiction/tolerance/dependence.    Administrative Billing:   Phone Call/Video Duration: 17 Minutes  Start: 3:03p  Stop: 3:20p    Time spent with patient was 17 minutes and greater than 50% of time or 9 minutes was spent in counseling and coordination of care regarding above diagnoses and treatment plan.     Patient Status:  Patient will continue to be seen for ongoing consultation and stabilization.    Signed:   Patty Franco DO  [facility]    Disclaimer: This note consists of symbols derived from keyboarding, dictation and/or voice recognition software. As a result, there may be errors in the script that have gone undetected. Please consider this when interpreting information found in this chart.

## 2021-12-20 NOTE — PATIENT INSTRUCTIONS
Treatment Plan:    Continue Mydayis ER 50 mg daily as needed for ADHD.     Continue fluoxetine 40 mg daily for mood, anxiety.     Restart lamotrigine: take 50 mg daily for 2 weeks then increase to 100 mg daily for BPD and Bipolar Disorder.      My direct HIPPA compliant fax number for forms (or upload to [employer]): [phone]    Continue all other cares per primary care provider.     Continue all other medications as reviewed per electronic medical record today.     Safety plan reviewed. To the Emergency Department as needed or call after hours crisis line at [phone] or [phone]. [place] Crisis Text [place]. Text [place] to 424100 or Suicide LifeLine Chat: suicidepreventionInforamaline.org/chat    Strongest recommendation is for DBT.     Schedule an appointment with me in 4-6 weeks or sooner as needed. Call [facility] at [phone] to schedule.    Follow up with primary care provider as planned or for acute medical concerns.    Call the psychiatric nurse line with medication questions or concerns at [phone].    [employer] [facility] may be used to communicate with your provider, but this is not intended to be used for emergencies.    Have previously discussed Lamictal (lamotrigine) can cause serious rashes including Patino-Eric syndrome which may requiring hospitalization and discontinuation of treatment. If any signs of a rash occur, please see your Primary Care Provider or a dermatologist immediately.     Discussed risks of stimulant medication including, but not limited to, decreased appetite, risk of tics (and that they may be lasting), trouble sleeping, cardiac risks such as increased heart rate and blood pressure, and rare risk of sudden cardiac death.  Also risk of addiction/tolerance/dependence.    THE DIALECTICAL BEHAVIOR THERAPY SKILLS WORKBOOK  By Tez Nicole and Brantley    DBT Skills Training Manual, Second Edition.  By Cheryle Mao, 2014.    DBT Skills Training Handouts and  Worksheets, Second Edition.  By Cheryle Mao, 2014.    Building a Life [employer] [facility]: A Memoir 2020.  By Cheryle Mao, 2020.    The Buddha & The Borderline: My Recovery from Borderline Personality Disorder through Dialectical Behavior Therapy, Taoism, & Online Dating.    By Angelique Sood. 2010.    I Hate You - Don t Leave Me: Understanding the Borderline Personality.   By Gopal Laurent. 2010.    Stop Walking on Eggshells: Taking your Life Back when Someone You Care About has Borderline Personality Disorder.    By MICHELINE Corley. 2010.    Overcoming Borderline Personality Disorder: A Family Guide for Healing and Change 1st Edition.  By Melinda Meyers. 2010.    [place] Someone with Borderline Personality Disorder: How to Keep Out-of-Control Emotions from Destroying Your Relationship.    By Gayathri Lei and Cheryle Mao. 2011.    Stop Caretaking the Borderline or Narcissist: How to End the Drama and Get on with Your Life.   By Pily Avila. 2014.    Borderline Personality Disorder: The Ultimate Practical Approach to Understanding, Coping, and Living with Borderline Personality Disorder.    By Cassandra Thorne. 2014.    This is Not the End. Conversations on Borderline Personality Disorder.   Edited by Rosa Napoles. 2016. (This is a collection of experiences from individuals dealing with BPD).    Beyond Borderline: True Stories of Recovery from Borderline Personality Disorder.   By Osmar Mccarthy and Ashu Lopez. 2016.    Stronger Than BPD: The Girl s Guide to Taking Control of Intense Emotions, Drama, and Chaos Using DBT.   By Lali Low [facility] (author) and Maria G Adams PsyD (foreward) 2017.    [place] DBT resources:  [place]://[place]gov[place]dhs/partners-and-providers/policies-procedures/adult-mental-health/dialectical-behavior-therapy/dbt-certified-providers/    Some [facility] DBT resources:  Niki Shaikh   [phone]   https://[employer]/faqs/     Dee Dee   [phone]    [email or web address] A[employer]eling.[email or web address]     [facility]-DBT   [phone]   https://www.s-dbt.com     [facility]   [phone]   https://dbtassociates.com

## 2021-12-21 ASSESSMENT — PATIENT HEALTH QUESTIONNAIRE - PHQ9
SUM OF ALL RESPONSES TO PHQ QUESTIONS 1-9: 17
SUM OF ALL RESPONSES TO PHQ QUESTIONS 1-9: 17

## 2021-12-21 ASSESSMENT — ANXIETY QUESTIONNAIRES: GAD7 TOTAL SCORE: 13

## 2022-01-23 ENCOUNTER — HEALTH MAINTENANCE LETTER (OUTPATIENT)
Age: 22
End: 2022-01-23

## 2022-01-28 ENCOUNTER — VIRTUAL VISIT (OUTPATIENT)
Dept: PSYCHIATRY | Facility: CLINIC | Age: 22
End: 2022-01-28
Payer: COMMERCIAL

## 2022-01-28 ENCOUNTER — VIRTUAL VISIT (OUTPATIENT)
Dept: BEHAVIORAL HEALTH | Facility: CLINIC | Age: 22
End: 2022-01-28
Payer: COMMERCIAL

## 2022-01-28 DIAGNOSIS — F60.3 BORDERLINE PERSONALITY DISORDER (H): ICD-10-CM

## 2022-01-28 DIAGNOSIS — F90.9 ATTENTION DEFICIT HYPERACTIVITY DISORDER (ADHD), UNSPECIFIED ADHD TYPE: Primary | ICD-10-CM

## 2022-01-28 DIAGNOSIS — F31.81 BIPOLAR 2 DISORDER (H): ICD-10-CM

## 2022-01-28 DIAGNOSIS — F32.1 CURRENT MODERATE EPISODE OF MAJOR DEPRESSIVE DISORDER, UNSPECIFIED WHETHER RECURRENT (H): ICD-10-CM

## 2022-01-28 PROCEDURE — 99213 OFFICE O/P EST LOW 20 MIN: CPT | Mod: GT | Performed by: PSYCHIATRY & NEUROLOGY

## 2022-01-28 PROCEDURE — 99207 PR CDG-MDM COMPONENT: MEETS LOW - DOWN CODED: CPT | Performed by: PSYCHIATRY & NEUROLOGY

## 2022-01-28 ASSESSMENT — PATIENT HEALTH QUESTIONNAIRE - PHQ9
SUM OF ALL RESPONSES TO PHQ QUESTIONS 1-9: 8
10. IF YOU CHECKED OFF ANY PROBLEMS, HOW DIFFICULT HAVE THESE PROBLEMS MADE IT FOR YOU TO DO YOUR WORK, TAKE CARE OF THINGS AT HOME, OR GET ALONG WITH OTHER PEOPLE: SOMEWHAT DIFFICULT
SUM OF ALL RESPONSES TO PHQ QUESTIONS 1-9: 8

## 2022-01-28 NOTE — Clinical Note
Please call this patient to get them scheduled for a follow-up visit in 3 months. Please schedule with me and the [facility]. Thanks!

## 2022-01-28 NOTE — PROGRESS NOTES
"Tana Hernandez is a 21 year old year old who is being evaluated via a billable video visit.      How would you like to obtain your AVS? MyChart  If you are dropped from the video visit, the video invite should be resent to: Text to cell phone: see Epic  Will anyone else be joining your video visit? No     Telemedicine Visit: The patient's condition can be safely assessed and treated via synchronous audio and visual telemedicine encounter.      Reason for Telemedicine Visit: Covid-19 Pandemic    Originating Site (Patient Location): Patient's home     Distant Site (Provider Location): Provider Remote Setting    Mode of Communication:  Video Conference via [employer]    As the provider I attest to compliance with applicable laws and regulations related to telemedicine.        Outpatient Psychiatric Progress Note    Name: Tana Hernandez   : 2000                    Primary Care Provider: Gina Ferraro NP   Therapist: Seeing outside therapist    PHQ-9 scores:  PHQ-9 SCORE 2021   PHQ-9 Total Score - - -   PHQ-9 Total Score [facility] 10 (Moderate depression) - 17 (Moderately severe depression)   PHQ-9 Total Score 10 17 17       ABDIRAHMAN-7 scores:  ABDIRAHMAN-7 SCORE 2021   Total Score - 7 (mild anxiety) 13 (moderate anxiety)   Total Score 7 7 13       Patient Identification:  Patient is a 21 year old, partnered / significant other   or   /  female  who presents for return visit with me.  Patient is currently employed part time. Patient attended the phone/video session alone. Patient prefers to be called: \"Piedad\".    Interim History:  I last saw Tana Hernandez for outpatient psychiatry return visit on 2021. During that appointment, we:      Continue Mydayis ER 50 mg daily as needed for ADHD.     Continue fluoxetine 40 mg daily for mood, anxiety.     Restart lamotrigine: take 50 mg daily for 2 weeks then increase to 100 mg " "daily for BPD and Bipolar Disorder.      My direct HIPPA compliant fax number for forms (or upload to "[employer]): [phone]    1/28: Patient reports overall things are actually going relatively okay.  She is felt better since getting back on lamotrigine.  Tolerating it well.  No rashes or negative side effects.  Feels more stable.  Home situation has been relatively stable and okay as well.  Brother has been a support.  Patient still needs to look into school situation.  Continues to look at DBT programs available.  No acute suicidal ideation.  No acute safety concerns.  No problematic drug or alcohol use.    Per [facility], Mireya Bella, [facility], during today's team-based visit:  Patient reports that she is \"feeling really good\". She feels her medications are working well and is happy with how well her symptoms are managed. Patient does not want any changes to her medications. She reports that she has still not connected with a DBT group but is planning on following through with this, denies need help with this. No problems with sleep. Denies suicidal thoughts.     Past medication trials include but are not limited to:   Abilify -increased blood sugars  Fluoxetine -stopped by patient  Adderall 3 bead -current  Citalopram  Wellbutrin - hives  Hydroxyzine  Likely others she cannot remember  Lamotrigine - stopped by patient  vyvanse - not helpful    Psychiatric ROS:  Tana Hernandez reports mood has been: Improved  Anxiety has been: Improved  Sleep has been: Improved  Halima sxs: None  Psychosis sxs: None  ADHD/ADD sxs: Stable  PTSD sxs: None  PHQ9 and GAD7 scores were reviewed today if completed.   Medication side effects: Denies  Current stressors include: Symptoms and see HPI above   Coping mechanisms and supports include: Therapy, Family, Hobbies and Friends    Current medications include:   Current Outpatient Medications   Medication Sig     Amphet-Dextroamphet 3-Bead ER 50 MG CP24 Take 50 mg by mouth daily as needed " (ADHD)     Amphet-Dextroamphet 3-Bead ER 50 MG CP24 Take 50 mg by mouth daily as needed (ADHD)     etonogestrel (IMPLANON/NEXPLANON) 68 MG IMPL 1 each by Subdermal route once      FLUoxetine (PROZAC) 40 MG capsule Take 1 capsule (40 mg) by mouth daily     lamoTRIgine (LAMICTAL) 100 MG tablet Take 1 tablet (100 mg) by mouth daily Start with half-tab for 2 weeks then increase to full tab.     metFORMIN (GLUCOPHAGE) 500 MG tablet TAKE 1 TABLET(500 MG) BY MOUTH TWICE DAILY WITH MEALS     No current facility-administered medications for this visit.       The [place] Prescription Monitoring Program has been reviewed and there are no concerns about diversionary activity for controlled substances at this time.   12/21/2021 12/20/2021 12/22/2021 2   Mydayis Er 50 Mg Capsule  30.00 30 [facility] [id number] [facility] ([id number]) 0/0  [place]  10/26/2021 10/26/2021 10/28/2021 1   Mydayis Er 50 Mg Capsule  30.00 30 [place] [facility] [id number] [facility] ([id number]) 0/[place]  [place]  10/21/2021 10/21/2021 10/22/2021 1   Mydayis Er 50 Mg Capsule  5.00 5 [facility] [id number] [facility] ([id number]) 0/0  [place]  10/01/2021 09/28/2021 10/04/2021 1   Vyvanse 40 Mg Capsule  15.00 15 [facility] [id number] [facility] ([id number]) 0/0  [place]  09/14/2021 09/13/2021 09/15/2021 1   Mydayis Er 50 Mg Capsule  30.00 30 [facility] [id number] [facility] ([id number]) 0/0  [place]        Past Medical/Surgical History:  Past Medical History:   Diagnosis Date     Anxiety      Depressive disorder       has a past medical history of Anxiety and Depressive disorder.    Social History:  Reviewed. No changes to social history except as noted above in HPI.    Vital Signs:   None. This is phone/video visit.     Labs:  Most recent laboratory results reviewed and no new labs.     Review of Systems:  10 systems (general, cardiovascular, respiratory, eyes, ENT, endocrine, GI, , M/S, neurological) were reviewed. Most pertinent finding(s) is/are: denies fever, cough, headaches, shortness of breath, chest pain, N/V, constipation/diarrhea,  trouble urinating, aches and pains. The remaining systems are all unremarkable.    Mental Status Examination (limited as this is by phone/video):  Appearance: Awake, alert, appears stated age, no acute distress, well-groomed   Attitude:  cooperative, pleasant   Motor: No gross abnormalities observed via video, not formally tested   Oriented to:  person, place, time, and situation  Attention Span and Concentration:  normal  Speech:  clear, coherent, regular rate, rhythm, and volume  Language: intact  Mood: Better  Affect: Overall appropriate and mood congruent  Associations:  no loose associations  Thought Process:  overall logical, linear and goal oriented  Thought Content:  no evidence of suicidal ideation or homicidal ideation, no evidence of psychotic thought, no auditory hallucinations present and no visual hallucinations present  Recent and Remote Memory:  Intact to interview. Not formally assessed. No amnesia.  Fund of Knowledge: appropriate  Insight:  good  Judgment:  intact, adequate for safety  Impulse Control:  intact    Suicide Risk Assessment:  Today Tana Hernandez reports no acute suicidal ideation. Based on all available evidence including the factors cited above, Tana Hernandez does not appear to be at imminent risk for self-harm, does not meet criteria for a 72-hr hold, and therefore remains appropriate for ongoing outpatient level of care.  A thorough assessment of risk factors related to suicide and self-harm have been reviewed and are noted above. The patient convincingly denies suicidality on several occasions. Local community safety resources printed and reviewed for patient to use if needed. There was no deceit detected, and the patient presented in a manner that was believable.     DSM5 Diagnosis:  Attention-Deficit/Hyperactivity Disorder  314.01 (F90.9) Unspecified Attention -Deficit / Hyperactivity Disorder  301.83 (F60.3) Borderline Personality Disorder   Bipolar 2 disorder  Borderline  personality disorder    Medical comorbidities include:   Patient Active Problem List    Diagnosis Date Noted     Diabetes mellitus, type 2 (H) 09/13/2021     Priority: Medium     Vitamin deficiency 02/05/2019     Priority: Medium     Class 3 severe obesity due to excess calories without serious comorbidity with body mass index (BMI) of 45.0 to 49.9 in adult (H) 01/04/2019     Priority: Medium     Attention deficit hyperactivity disorder (ADHD), unspecified ADHD type 09/29/2017     Priority: Medium     Major depression in complete remission (H) 10/26/2016     Priority: Medium       Psychosocial & Contextual Factors: see HPI above    Assessment:  Intake, 9/28/2021:   Tana Hernandez is a 21-year-old female with past psychiatric history including diagnoses of ADHD and bipolar disorder who presents today for psychiatric evaluation.  After chart review and interview with the patient her bipolar disorder is a little less clear.  She does meet nearly all the criteria for borderline personality disorder (we went through the criteria today).  Discussed she does meet criteria for diagnosis of borderline personality disorder.  Also discussed that borderline and bipolar disorder are highly comorbid.  Could have both, or, the borderline diagnosis might be the root of a lot of her mental health struggles.  Discussed recommendation for DBT.  She did do well on Abilify.  Abilify can be helpful as a mood stabilizer in both bipolar and borderline.  Unfortunately it was increasing her blood sugars.  I recommend a trial with lamotrigine to see if that might be better tolerated and still helpful.  She is agreeable to a trial.  We will continue fluoxetine for now but did discuss risk of precipitating hypomanic/manic episodes and bipolar disorder.  I do think she needs to further address her disordered eating.  She does states she works on with her therapist.  Could always consider more intensive therapy at [facility].  She is agreeable to a trial with Vyvanse and move her Adderall since she does not feel her Adderall has been as helpful as maybe an ADHD medication should be.  Discussed risks and benefits of stimulants.  Strongly recommend she continually some form of therapy, if not DBT.  No acute safety concerns today.  No problematic drug or alcohol use.    10/26/2021:  Patient overall with some improvement on lamotrigine.  Feels more stable.  Did not do as well on Vyvanse as her previous stimulant.  Went back to Mydayis.  Overall this has been a good medication for her.  We will continue to optimize therapy with lamotrigine for mood stabilization, bipolar disorder, borderline personality disorder.  She is still hopeful to get into a DBT program.  This would be strongly recommended.  No acute safety concerns.  Tolerating medications well with no negative side effects.  No rash on lamotrigine.  No problematic drug or alcohol use.    12/20/2021:   Patient fortunately not doing very well.  Her and her brother have been struggling to meet ends meet since her father has been unable to contribute to the financial burden.  Father not doing well with his own mental health.  Discussed getting into DBT for additional support during this difficult time.  DBT would also help her develop necessary coping skills and strategies to better handle everything going on.  Discussed she would definitely benefit from having a  available in a DBT program to contact when necessary.  We will restart her lamotrigine.  She will continue fluoxetine and her Mydayis.  I am agreeable to filling out any form she might need due to dropping out this semester and/or needing to go back next semester.  No acute safety concerns today.  No problematic drug or alcohol use.    1/28/2022:  Patient overall feeling a lot better back on lamotrigine.  No negative side effects.  Tolerating well.  We will continue to look into the school situation and fax any  documents if needed.  I will keep her on our docket of patients since she may need schoolwork filled out.  I will see her back in a few months.  No acute safety concerns.  No SI.  No problematic drug or alcohol use.  No med changes.    Medication side effects and alternatives were reviewed. Health promotion activities recommended and reviewed today. All questions addressed. Education and counseling completed regarding risks and benefits of medications and psychotherapy options. Recommend therapy for additional support.     Treatment Plan:    Continue Mydayis ER 50 mg daily as needed for ADHD.     Continue fluoxetine 40 mg daily for mood, anxiety.     Continue lamotrigine 100 mg daily for BPD and Bipolar Disorder.      My direct HIPPA compliant fax number for forms (or upload to [employer]): [phone]    Continue all other cares per primary care provider.     Continue all other medications as reviewed per electronic medical record today.     Safety plan reviewed. To the Emergency Department as needed or call after hours crisis line at [phone] or [phone]. [place] Crisis Text [place]. Text [place] to 318933 or Suicide LifeLine Chat: suicidepreventionlifeline.org/chat    Strongest recommendation is for DBT.     Schedule an appointment with me in 3 months or sooner as needed. Call [facility] at [phone] to schedule.    Follow up with primary care provider as planned or for acute medical concerns.    Call the psychiatric nurse line with medication questions or concerns at [phone].    [employer] may be used to communicate with your provider, but this is not intended to be used for emergencies.    Have previously discussed Lamictal (lamotrigine) can cause serious rashes including Patino-Eric syndrome which may requiring hospitalization and discontinuation of treatment. If any signs of a rash occur, please see your Primary Care Provider or a dermatologist immediately.     Have previously  discussed risks of stimulant medication including, but not limited to, decreased appetite, risk of tics (and that they may be lasting), trouble sleeping, cardiac risks such as increased heart rate and blood pressure, and rare risk of sudden cardiac death.  Also risk of addiction/tolerance/dependence.    Administrative Billing:   Phone Call/Video Duration: 5 Minutes  Start: 11:21a  Stop: 11:26a    Time spent with patient was 5 minutes and greater than 50% of time or 3 minutes was spent in counseling and coordination of care regarding above diagnoses and treatment plan.     Patient Status:  Patient will continue to be seen for ongoing consultation and stabilization.    Signed:   Patty Franco DO  [facility]    Disclaimer: This note consists of symbols derived from keyboarding, dictation and/or voice recognition software. As a result, there may be errors in the script that have gone undetected. Please consider this when interpreting information found in this chart.

## 2022-01-28 NOTE — PROGRESS NOTES
Collaborative Care Psychiatry Service (CCPS)  January 28th, 2022      Behavioral Health Clinician Progress Note    Patient Name: Tana Hernandez      Telemedicine Visit: The patient's condition can be safely assessed and treated via synchronous audio and visual telemedicine encounter.      Reason for Telemedicine Visit: Services only offered telehealth    Originating Site (Patient Location): Patient's home    Distant Site (Provider Location): Provider Remote Setting- Home Office    Consent:  The patient/guardian has verbally consented to: the potential risks and benefits of telemedicine (video visit) versus in person care; bill my insurance or make self-payment for services provided; and responsibility for payment of non-covered services.     Mode of Communication:  Video Conference via [employer]    As the provider I attest to compliance with applicable laws and regulations related to telemedicine.         Service Type:  Individual      Service Location:   Face to Face in Home / Community via [employer]     Session Start Time: 11:00am  Session End Time: 11:06am      Session Length: 6 minutes     Attendees: Patient    Visit Activities (Refresh list every visit): [facility] Only    Diagnostic Assessment Date: 9/28/2021 by Lang Martin  Treatment review date: 4/28/22  See Flowsheets for today's PHQ-9 and ABDIRAHMAN-7 results  Previous PHQ-9:   PHQ-9 SCORE 12/20/2021 12/20/2021 1/28/2022   PHQ-9 Total Score - - -   PHQ-9 Total Score MyChart - 17 (Moderately severe depression) 8 (Mild depression)   PHQ-9 Total Score 17 17 8       Previous ABDIRAHMAN-7:   ABDIRAHMAN-7 SCORE 9/28/2021 9/28/2021 12/20/2021   Total Score - 7 (mild anxiety) 13 (moderate anxiety)   Total Score 7 7 13       WHODAS  WHODAS 2.0 Total Score 9/28/2021 9/28/2021   Total Score 19 19   Total Score MyChart - 19        CAGE  CAGE-AID Flowsheet 9/28/2021 9/28/2021   Have you ever felt you should Cut down on your drinking or drug use? 1 1   Have people Annoyed you by criticizing your  "drinking or drug use? 0 0   Have you ever felt bad or Guilty about your drinking or drug use? 0 0   Have you ever had a drink or used drugs first thing in the morning to steady your nerves or to get rid of a hangover? (Eye opener) 0 0   CAGE-AID SCORE 1 1   1. Have you felt you ought to cut down on your drinking or drug use? - Yes   2. Have people annoyed you by criticizing your drinking or drug use? - No   3. Have you felt bad or guilty about your drinking or drug use? - No   4. Have you ever had a drink or used drugs first thing in the morning to steady your nerves or to get rid of a hangover (eye opener)? - No   CAGE-AID SCORE - 1 (A total score of 2 or greater is considered clinically significant)         DATA  Extended Session (60+ minutes): No  Interactive Complexity: No  Crisis: No    Medication Compliance:  Yes      Chemical Use Review:   Substance Use: Chemical use reviewed, no active concerns identified      Tobacco Use: No current tobacco use.      Current Stressors / Issues:    Patient reports that she is \"feeling really good\". She feels her medications are working well and is happy with how well her symptoms are managed. Patient does not want any changes to her medications. She reports that she has still not connected with a DBT group but is planning on following through with this, denies need help with this. No problems with sleep. Denies suicidal thoughts.       Progress on Treatment Objective(s) / Homework:  Satisfactory progress - MAINTENANCE (Working to maintain change, with risk of relapse); Intervened by continuing to positively reinforce healthy behavior choice     Motivational Interviewing    MI Intervention: Expressed Empathy/Understanding, Supported Autonomy, Collaboration, Evocation, Permission to raise concern or advise, Open-ended questions and Reflections: simple and complex     Change Talk Expressed by the Patient: Desire to change Ability to change Committment to change Activation " Taking steps    Provider Response to Change Talk: E - Evoked more info from patient about behavior change, A - Affirmed patient's thoughts, decisions, or attempts at behavior change, R - Reflected patient's change talk and S - Summarized patient's change talk statements    Also provided psychoeducation about behavioral health condition, symptoms, and treatment options      Review of Symptoms per patient report:  Depression: Lack of interest, Change in energy level, Difficulties concentrating, Change in appetite, Feelings of hopelessness, Feelings of helplessness, Low self-worth, Irritability, Feeling sad, down, or depressed, Withdrawn, Poor hygeine and Frequent crying  Halima:  Elevated mood, Grandiosity, Racing thoughts, Increased activity, Pressured speech, Restlessness, Distractibility and Impulsiveness  Psychosis: No Symptoms  Anxiety: Excessive worry, Nervousness, Physical complaints, such as headaches, stomachaches, muscle tension, Psychomotor agitation, Ruminations, Poor concentration and Irritability  Panic:  Palpitations, Tremors, Shortness of breath and Tingling  Post Traumatic Stress Disorder:  No Symptoms   Eating Disorder: Restriction, Binging, Purging, Laxative use and Weight change  ADD / ADHD:  No symptoms  Conduct Disorder: No symptoms  Autism Spectrum Disorder: No symptoms  Obsessive Compulsive Disorder: No Symptoms     Changes in Health Issues:   None reported    Assessment: Current Emotional / Mental Status (status of significant symptoms):  Risk status (Self / Other harm or suicidal ideation)  Patient denies a history of suicidal ideation, suicide attempts, self-injurious behavior, homicidal ideation, homicidal behavior and and other safety concerns  Patient denies current fears or concerns for personal safety.  Patient denies current or recent suicidal ideation or behaviors.  Patient denies current or recent homicidal ideation or behaviors.  Patient denies current or recent self injurious  behavior or ideation.  Patient denies other safety concerns.  A safety and risk management plan has not been developed at this time, however patient was encouraged to call [place] [phone] should there be a change in any of these risk factors.    Appearance:   Appropriate   Eye Contact:   Good   Psychomotor Behavior: Normal   Attitude:   Cooperative   Orientation:   All  Speech   Rate / Production: Normal    Volume:  Normal   Mood:    Normal  Affect:    Appropriate   Thought Content:  Clear   Thought Form:  Coherent  Logical   Insight:    Fair     Diagnoses:  1. Attention deficit hyperactivity disorder (ADHD), unspecified ADHD type    2. Borderline personality disorder (H)    3. Current moderate episode of major depressive disorder, unspecified whether recurrent (H)        Collateral Reports Completed:  Communicated with: Dr. Franco    Plan: (Homework, other):  Patient was given information about behavioral services and encouraged to schedule a follow up appointment with the clinic [facility]  She was also given information about mental health symptoms and treatment options .  CD Recommendations: No indications of CD issues.     MeasurableTreatment Goal(s) related to diagnosis / functional impairment(s)  Goal 1: Patient will stabilize mood    I will know I've met my goal when I feel more in control of my emotions.      Objective #A (Patient Action)    Patient will identify 4 strategies for managing mood .  Status: New - Date: 1/28/22     Intervention(s)  [facility] will teach emotional regulation skills. to implement into her daily routine for managing emotions.    Objective #B  Patient will Attend DBT group .  Status: New - Date: 1/28/22     Intervention(s)   provided resources for DBT groups on 12/20/21    Patient has reviewed and agreed to the above plan.      Mireya Bella, [facility]  January 28, 2022

## 2022-01-28 NOTE — PATIENT INSTRUCTIONS
Treatment Plan:    Continue Mydayis ER 50 mg daily as needed for ADHD.     Continue fluoxetine 40 mg daily for mood, anxiety.     Continue lamotrigine 100 mg daily for BPD and Bipolar Disorder.      My direct HIPPA compliant fax number for forms (or upload to [employer]): [phone]    Continue all other cares per primary care provider.     Continue all other medications as reviewed per electronic medical record today.     Safety plan reviewed. To the Emergency Department as needed or call after hours crisis line at [phone] or [phone]. [place] Crisis Text Line. Text [place] to 242888 or Suicide LifeLine Chat: suicidepreventionboldUnderline. llcline.org/chat    Strongest recommendation is for DBT.     Schedule an appointment with me in 3 months or sooner as needed. Call [facility] at [phone] to schedule.    Follow up with primary care provider as planned or for acute medical concerns.    Call the psychiatric nurse line with medication questions or concerns at [phone].    [employer] may be used to communicate with your provider, but this is not intended to be used for emergencies.    Have previously discussed Lamictal (lamotrigine) can cause serious rashes including Patino-Eric syndrome which may requiring hospitalization and discontinuation of treatment. If any signs of a rash occur, please see your Primary Care Provider or a dermatologist immediately.     Have previously discussed risks of stimulant medication including, but not limited to, decreased appetite, risk of tics (and that they may be lasting), trouble sleeping, cardiac risks such as increased heart rate and blood pressure, and rare risk of sudden cardiac death.  Also risk of addiction/tolerance/dependence.

## 2022-01-29 ASSESSMENT — PATIENT HEALTH QUESTIONNAIRE - PHQ9: SUM OF ALL RESPONSES TO PHQ QUESTIONS 1-9: 8

## 2022-02-25 ENCOUNTER — VIRTUAL VISIT (OUTPATIENT)
Dept: FAMILY MEDICINE | Facility: CLINIC | Age: 22
End: 2022-02-25
Payer: COMMERCIAL

## 2022-02-25 DIAGNOSIS — E11.65 TYPE 2 DIABETES MELLITUS WITH HYPERGLYCEMIA, WITHOUT LONG-TERM CURRENT USE OF INSULIN (H): Primary | ICD-10-CM

## 2022-02-25 DIAGNOSIS — H92.09 OTALGIA, UNSPECIFIED LATERALITY: ICD-10-CM

## 2022-02-25 DIAGNOSIS — F60.3 BORDERLINE PERSONALITY DISORDER (H): ICD-10-CM

## 2022-02-25 PROCEDURE — 99214 OFFICE O/P EST MOD 30 MIN: CPT | Mod: TEL | Performed by: NURSE PRACTITIONER

## 2022-02-25 RX ORDER — METFORMIN HCL 500 MG
1000 TABLET, EXTENDED RELEASE 24 HR ORAL 2 TIMES DAILY WITH MEALS
Qty: 180 TABLET | Refills: 1 | Status: SHIPPED | OUTPATIENT
Start: 2022-02-25 | End: 2022-06-28

## 2022-02-25 ASSESSMENT — PATIENT HEALTH QUESTIONNAIRE - PHQ9
SUM OF ALL RESPONSES TO PHQ QUESTIONS 1-9: 9
5. POOR APPETITE OR OVEREATING: SEVERAL DAYS

## 2022-02-25 ASSESSMENT — ANXIETY QUESTIONNAIRES
GAD7 TOTAL SCORE: 6
1. FEELING NERVOUS, ANXIOUS, OR ON EDGE: SEVERAL DAYS
5. BEING SO RESTLESS THAT IT IS HARD TO SIT STILL: NOT AT ALL
7. FEELING AFRAID AS IF SOMETHING AWFUL MIGHT HAPPEN: SEVERAL DAYS
6. BECOMING EASILY ANNOYED OR IRRITABLE: SEVERAL DAYS
2. NOT BEING ABLE TO STOP OR CONTROL WORRYING: SEVERAL DAYS
IF YOU CHECKED OFF ANY PROBLEMS ON THIS QUESTIONNAIRE, HOW DIFFICULT HAVE THESE PROBLEMS MADE IT FOR YOU TO DO YOUR WORK, TAKE CARE OF THINGS AT HOME, OR GET ALONG WITH OTHER PEOPLE: SOMEWHAT DIFFICULT
3. WORRYING TOO MUCH ABOUT DIFFERENT THINGS: SEVERAL DAYS

## 2022-02-25 NOTE — PROGRESS NOTES
Piedad is a 21 year old who is being evaluated via a billable telephone visit.      What phone number would you like to be contacted at? [phone]  How would you like to obtain your AVS? MyChart    Assessment & Plan     Type 2 diabetes mellitus with hyperglycemia, without long-term current use of insulin (H)  Only taking metformin once a day.  Discussed goal of 1000mg twice a day of extended release.  Patient would like to be on this for 3 months before recheck.  Future labs placed.  Intentionally not presribed asa, statin, ace due to age  - metFORMIN (GLUCOPHAGE-XR) 500 MG 24 hr tablet; Take 2 tablets (1,000 mg) by mouth 2 times daily (with meals)  - Hemoglobin A1c; Future  - Albumin Random Urine Quantitative with Creat Ratio; Future  - Hepatic panel (Albumin, ALT, AST, Bili, Alk Phos, TP); Future    Borderline personality disorder (H)  Followed by Psychiatry.  stable    Otalgia, unspecified laterality  Will let me know if not improving with supportive cares.  Gina Ferraro CNP     25 minutes spent on the date of the encounter doing chart review, review of outside records, review of test results and interpretation of tests          No follow-ups on file.    Gina Ferraro NP  [facility]    Santa Herbert is a 21 year old who presents for the following health issues     HPI     Diabetes Follow-up, right ear pain.       How often are you checking your blood sugar? Not at all    What concerns do you have today about your diabetes? Other: Wanting to change medication to extended release.      Do you have any of these symptoms? (Select all that apply)  No numbness or tingling in feet.  No redness, sores or blisters on feet.  No complaints of excessive thirst.  No reports of blurry vision.  No significant changes to weight.    Have you had a diabetic eye exam in the last 12 months? No     In the past URI- will lead to ear infections.  Has recent URI- did have fever earlier this week.   Feels pressure and crackling.  She usually runs 97 was running 99.6. has been taking sudafed and flonase.     Mental health- doing well.  Seeing Verenice Franco.  Has dropped out of college.  Had family crisis and was diagnosed with borderline personality disorder.  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           BP Readings from Last 2 Encounters:   09/16/21 130/84   09/13/21 112/82     Hemoglobin A1C (%)   Date Value   09/13/2021 7.8 (H)     LDL Cholesterol Calculated (mg/dL)   Date Value   09/13/2021 96   04/20/2017 80           How many servings of fruits and vegetables do you eat daily?  2-3    On average, how many sweetened beverages do you drink each day (Examples: soda, juice, sweet tea, etc.  Do NOT count diet or artificially sweetened beverages)?   0    How many days per week do you exercise enough to make your heart beat faster? 3 or less    How many minutes a day do you exercise enough to make your heart beat faster? 9 or less  How many days per week do you miss taking your medication? 7 -forgets second dose daily     What makes it hard for you to take your medications?  remembering to take    Depression Followup    How are you doing with your depression since your last visit? Depends on the day    Are you having other symptoms that might be associated with depression? No    Have you had a significant life event?  No     Are you feeling anxious or having panic attacks?   Yes:  anxiety    Do you have any concerns with your use of alcohol or other drugs? No    Social History     Tobacco Use     Smoking status: Never Smoker     Smokeless tobacco: Never Used   Substance Use Topics     Alcohol use: No     Alcohol/week: 0.0 standard drinks     Drug use: No     PHQ 12/20/2021 1/28/2022 2/25/2022   PHQ-9 Total Score 17 8 9   Q9: Thoughts of better off dead/self-harm past 2 weeks Several days Not at all Not at all   F/U: Thoughts of suicide or self-harm Yes - -   F/U: Self harm-plan No - -   F/U:  Self-harm action No - -   F/U: Safety concerns No - -     ABDIRAHMAN-7 SCORE 9/28/2021 12/20/2021 2/25/2022   Total Score 7 (mild anxiety) 13 (moderate anxiety) -   Total Score 7 13 6     Last PHQ-9 2/25/2022   1.  Little interest or pleasure in doing things 0   2.  Feeling down, depressed, or hopeless 1   3.  Trouble falling or staying asleep, or sleeping too much 1   4.  Feeling tired or having little energy 1   5.  Poor appetite or overeating 3   6.  Feeling bad about yourself 2   7.  Trouble concentrating 1   8.  Moving slowly or restless 0   Q9: Thoughts of better off dead/self-harm past 2 weeks 0   PHQ-9 Total Score 9   Difficulty at work, home, or with people Somewhat difficult   In the past two weeks have you had thoughts of suicide or self harm? -   Do you have concerns about your personal safety or the safety of others? -   In the past 2 weeks have you thought about a plan or had intention to harm yourself? -   In the past 2 weeks have you acted on these thoughts in any way? -     ABDIRAHMAN-7  2/25/2022   1. Feeling nervous, anxious, or on edge 1   2. Not being able to stop or control worrying 1   3. Worrying too much about different things 1   4. Trouble relaxing 1   5. Being so restless that it is hard to sit still 0   6. Becoming easily annoyed or irritable 1   7. Feeling afraid, as if something awful might happen 1   ABDIRAHMAN-7 Total Score 6   If you checked any problems, how difficult have they made it for you to do your work, take care of things at home, or get along with other people? Somewhat difficult       Suicide Assessment Five-step Evaluation and Treatment (SAFE-T)  {[employer] Systems   Constitutional, HEENT, cardiovascular, pulmonary, GI, , musculoskeletal, neuro, skin, endocrine and psych systems are negative, except as otherwise noted.      Objective           Vitals:  No vitals were obtained today due to virtual visit.    Physical Exam   healthy, alert and no distress  PSYCH: Alert and oriented times 3;  coherent speech, normal   rate and volume, able to articulate logical thoughts, able   to abstract reason, no tangential thoughts, no hallucinations   or delusions  Her affect is normal and pleasant  RESP: No cough, no audible wheezing, able to talk in full sentences  Remainder of exam unable to be completed due to telephone visits          Phone call duration: 9 minutes

## 2022-02-26 ASSESSMENT — ANXIETY QUESTIONNAIRES: GAD7 TOTAL SCORE: 6

## 2022-03-05 ENCOUNTER — MYC MEDICAL ADVICE (OUTPATIENT)
Dept: PSYCHIATRY | Facility: CLINIC | Age: 22
End: 2022-03-05
Payer: COMMERCIAL

## 2022-03-15 ENCOUNTER — OFFICE VISIT (OUTPATIENT)
Dept: OBGYN | Facility: OTHER | Age: 22
End: 2022-03-15
Payer: COMMERCIAL

## 2022-03-15 ENCOUNTER — MEDICAL CORRESPONDENCE (OUTPATIENT)
Dept: HEALTH INFORMATION MANAGEMENT | Facility: CLINIC | Age: 22
End: 2022-03-15

## 2022-03-15 VITALS
SYSTOLIC BLOOD PRESSURE: 144 MMHG | OXYGEN SATURATION: 100 % | BODY MASS INDEX: 50.21 KG/M2 | DIASTOLIC BLOOD PRESSURE: 98 MMHG | WEIGHT: 293 LBS | HEART RATE: 86 BPM

## 2022-03-15 DIAGNOSIS — Z30.09 FAMILY PLANNING: Primary | ICD-10-CM

## 2022-03-15 PROCEDURE — 11982 REMOVE DRUG IMPLANT DEVICE: CPT | Performed by: OBSTETRICS & GYNECOLOGY

## 2022-03-15 NOTE — TELEPHONE ENCOUNTER
Patty Franco DO routed conversation to Psych Rn - [facility] 1 minute ago (10:50 AM)     Patty Franco DO 1 minute ago (10:50 AM)     AB       Can RN please file the KENNY in pt chart (fax to HIM or print and put in folder for chart upload)? Thanks so much! I will fax completed forms today and also send the forms I have to HIM for chart upload.       Unsigned   Documentation      Patty Franco DO Ortiz, Lucy E 2 minutes ago (10:49 AM)     AB      Thanks Piedad! I will get those forms faxed today    Form faxed to medical records office.

## 2022-03-15 NOTE — CONFIDENTIAL NOTE
Can RN please file the KENNY in pt chart (fax to HIM or print and put in folder for chart upload)? Thanks so much! I will fax completed forms today and also send the forms I have to HIM for chart upload.

## 2022-03-15 NOTE — PROGRESS NOTES
INDICATIONS:                                                      Tana is here today for removal of Nexplanon contraceptive implant.     Is a pregnancy test required: No.  Was a consent obtained?  Yes    Today's PHQ-2 Score:   PHQ-2 ( 1999 [employer]) 9/13/2021   Q1: Little interest or pleasure in doing things 1   Q2: Feeling down, depressed or hopeless 1   PHQ-2 Score 2   PHQ-2 Total Score (12-17 Years)- Positive if 3 or more points; Administer PHQ-A if positive 2   Q1: Little interest or pleasure in doing things Several days   Q2: Feeling down, depressed or hopeless Several days   PHQ-2 Score 2       PROCEDURE:                                                      Patient was placed in dorsal supine position with left arm abducted and externally rotated. Nexplanon was palpated under skin. The area was cleansed with betadine. The distal site was injected with 2 ml of 1% plain lidocaine.  While pushing down on the proximal end, 2 mm incision was made over the distal implant with a scalpel. The implant was grasped with a mosquito forceps and removed intact. The skin was closed with Steristrip. A pressure bandage was placed for the next 12-24 hours.  She tolerated the procedure well. There were no complications. Patient was discharged in stable condition.    Call if bleeding, pain or fever occur. and Birth control counseling given.    Justino Go MD

## 2022-04-05 ENCOUNTER — MYC REFILL (OUTPATIENT)
Dept: PSYCHIATRY | Facility: CLINIC | Age: 22
End: 2022-04-05
Payer: COMMERCIAL

## 2022-04-05 DIAGNOSIS — F90.9 ATTENTION DEFICIT HYPERACTIVITY DISORDER (ADHD), UNSPECIFIED ADHD TYPE: ICD-10-CM

## 2022-04-05 RX ORDER — DEXTROAMPHETAMINE SACCHARATE, AMPHETAMINE ASPARTATE MONOHYDRATE, DEXTROAMPHETAMINE SULFATE, AMPHETAMINE SULFATE 12.5; 12.5; 12.5; 12.5 MG/1; MG/1; MG/1; MG/1
50 CAPSULE, EXTENDED RELEASE ORAL DAILY PRN
Qty: 30 CAPSULE | Refills: 0 | Status: SHIPPED | OUTPATIENT
Start: 2022-04-05 | End: 2022-05-03

## 2022-04-05 NOTE — TELEPHONE ENCOUNTER
Date of Last Office Visit: 1/28/22  Date of Next Office Visit: None routing to scheduling  No shows since last visit: 0  Cancellations since last visit: 0    Medication requested: Amphet-Dextroamphet 3-Bead ER 50 MG CP24 Date last ordered: 1/19/22 Qty: 30 Refills: 0    DOES NOT MATCH OUR RX MED LIST   Review of [place] ?:Yes      Lapse in medication adherence greater than 5 days?: NO PRN  Medication refill request verified as identical to current order?: Yes  Result of Last DAM, VPA, Li+ Level, CBC, or Carbamazepine Level (at or since last visit): N/A      Last visit treatment plan:     Continue Mydayis ER 50 mg daily as needed for ADHD.     Continue fluoxetine 40 mg daily for mood, anxiety.     Continue lamotrigine 100 mg daily for BPD and Bipolar Disorder.      My direct HIPPA compliant fax number for forms (or upload to [employer]): [phone]    Continue all other cares per primary care provider.     Continue all other medications as reviewed per electronic medical record today.     Safety plan reviewed. To the Emergency Department as needed or call after hours crisis line at [phone] or [phone]. [place] Crisis Text Line. Text [place] to 993329 or Suicide LifeLine Chat: suicidepreventionlifeline.org/chat    Strongest recommendation is for DBT.     Schedule an appointment with me in 3 months or sooner as needed. Call [facility] at [phone] to schedule.      []Medication refilled per  Medication Refill in Ambulatory Care  policy.  [x]Medication unable to be refilled by RN due to criteria not met as indicated below:    []Eligibility - not seen in the last year   []Supervision - no future appointment   []Compliance - no shows, cancellations or lapse in therapy   []Verification - order discrepancy   [x]Controlled medication   [x]Medication not included in policy   []90-day supply request   []Other

## 2022-05-10 ENCOUNTER — OFFICE VISIT (OUTPATIENT)
Dept: ORTHOPEDICS | Facility: CLINIC | Age: 22
End: 2022-05-10
Payer: COMMERCIAL

## 2022-05-10 VITALS — BODY MASS INDEX: 47.09 KG/M2 | WEIGHT: 293 LBS | HEIGHT: 66 IN

## 2022-05-10 DIAGNOSIS — M79.602 LEFT ARM PAIN: Primary | ICD-10-CM

## 2022-05-10 DIAGNOSIS — R20.2 NUMBNESS AND TINGLING IN LEFT HAND: ICD-10-CM

## 2022-05-10 DIAGNOSIS — R20.0 NUMBNESS AND TINGLING IN LEFT HAND: ICD-10-CM

## 2022-05-10 PROCEDURE — 99203 OFFICE O/P NEW LOW 30 MIN: CPT | Performed by: PEDIATRICS

## 2022-05-10 ASSESSMENT — ENCOUNTER SYMPTOMS
NUMBNESS: 1
PARESTHESIAS: 1
COLOR CHANGE: 0
WOUND: 0

## 2022-05-10 NOTE — LETTER
5/10/2022         RE: Tana Hernandez  [id number] [address] [place] [id number]        Dear Colleague,    Thank you for referring your patient, Tana Hernandez, to the [facility]. Please see a copy of my visit note below.    ASSESSMENT & PLAN    Tana was seen today for pain.    Diagnoses and all orders for this visit:    Left arm pain    Numbness and tingling in left hand      Acute issues left UE, c/w overuse.  Potential for improvement with time.  We discussed the following: symptom treatment, activity modification/rest, imaging, rehab, medication, potential for improvement with time, support for the affected area and electrodiagnostic testing. Following discussion, plan:  PT offered, HEP reviewed.  Discussed support.  Letter for work.  Questions answered. Discussed signs and symptoms that may indicate more serious issues; the patient was instructed to seek appropriate care if noted. Piedad indicates understanding of these issues and agrees with the plan.      See Patient Instructions  Patient Instructions   Favor the constellation of symptoms from the neck to the left upper extremity being related to recent outdoor activities, overuse.  There may be some component radiating from the neck, but not clearly cervical radiculopathy.  We also discussed consideration of some component of ulnar neuropathy from the elbow, and possible median nerve involvement at the wrist with some exam findings.  Considerations currently include imaging, rehab, support for areas in the left arm, potential use of medication, and future consideration for electrodiagnostic testing.  Home exercises reviewed today, focusing on some stretching for the trap and left arm.  Physical therapy is also a consideration; contact clinic if interested in PT.  Reviewed support options with overnight use of towel roll for the elbow, as well as wrist bracing overnight in neutral position.  Would suggest trying at least a  towel roll at this point.  Provided a letter for work, okay to return to work as able.  Monitor symptoms over the next 1 week to begin.    If you have any further questions for your physician or physician s care team you can call [phone] and use option 3 to leave a voice message. Calls received during business hours will be returned same day.        Axel Baeza [facility]    -----  Chief Complaint   Patient presents with     Left Upper Arm - Pain       SUBJECTIVE  Tana Hernandez is a/an 21 year old female who is seen as a self referral for evaluation of left shoulder and arm pain.  Pain started after working the in garden 5/7/22, and then worked the next day.  Pain from her jaw, down to her shoulder, posterior shoulder and down her arm to her fingers.  Numbness in her small and ring finger.    HX of hypermobile jonts      The patient is seen by themselves.  The patient is Right handed    Onset: 3 day(s) ago. Patient describes injury as working in the garden   Location of Pain: left upper extremity   Worsened by: sleeping on left side, use of arm   Better with: massage, stretching   Treatments tried: massage,   Associated symptoms: numbness, tingling and weakness of hand    Orthopedic/Surgical history: possible dislocated shoulder   Social History/Occupation:      No family history pertinent to patient's problem today.     **  Has had similar symptoms to this off/on during day prior to this episode. Typically resolves with time.  This episode worse, called in to work due to symptoms.    **  More numbness and pain today from diving here. Started with numbness to small finger. Now more in thumb as well. Pain appears to radiate from neck left side laterally.  Currently numbness more in ulnar 2 digits.      **  Also with some hx left IT band issues. In left LE, pain more deep, lateral hip and thigh.      REVIEW OF SYSTEMS:  Review of Systems   Skin:  "Negative for color change, pallor, rash and wound.   Neurological: Positive for numbness and paresthesias.   All other systems reviewed and are negative.        OBJECTIVE:  Ht 1.676 m (5' 6\")   Wt 136.1 kg (300 lb)   BMI 48.42 kg/m     General: healthy, alert and in no distress  HEENT: no scleral icterus or conjunctival erythema  Skin: no suspicious lesions or rash. No jaundice.  CV: distal perfusion intact   Resp: normal respiratory effort without conversational dyspnea   Psych: normal mood and affect  Gait: normal steady gait with appropriate coordination and balance   Neuro: Normal tone      Cervical Spine Exam    Range of Motion:       forward flexion full, no change         extension full, no change        lateral flexion to right with some beneficial stretching on left, some radiation to left UE; to left no change    Tender:      paraspinal muscles       upper border of trapezius  left    Strength:     C4 (shoulder shrug)  symmetric 5/5       C5 (shoulder abduction) symmetric 5/5       C6 (elbow flexion) symmetric 5/5       C7 (elbow extension) symmetric 5/5       C8 (finger abduction, thumb flexion) symmetric 5/5    Reflexes:      C5 (biceps) symmetric        C6 (supinator) symmetric        C7 (triceps) symmetric   intact    Special Tests:     equivocal Spurling to right, min N/T to left, some stretching sensation left UE; to left mild improvement in left UE tingling    Skin:     well perfused       capillary refill brisk    Lymphatics:      no edema noted in the upper extremities           Left Shoulder exam    ROM:      forward flexion         abduction        internal rotation        external rotation   Grossly full, min tightness sensation end of ER          Left Elbow exam:    Inspection:     no ecchymosis       no edema or effusion    ROM:     full with flexion, extension, forearm supination and pronation.  Full flexion with some forearm pain, also similar pain with forearm rotation    Tender: cubital " tunnel    Tinel positive medial elbow, reproduces symptoms to forearm, hand        Hand/wrist (left):    Inspection:  No deformity noted.  No swelling. No ecchymosis.    Motion:  Wrist flexion   Wrist extension   Wrist radial deviation   Wrist ulnar deviation   Digit motion   full    Strength:  Grossly intact    Radial pulses normal, +2/4, capillary refill brisk.    Tinel pos. Phalen pos (more ulnar 2 digits). Reverse Phalen some wrist discomfort, no change in hand.          RADIOLOGY:  None today, without injury and with exam noted above.              35 minutes spent on the date of the encounter doing chart review, history and exam, documentation and further activities per the note           Again, thank you for allowing me to participate in the care of your patient.        Sincerely,        Axel Baeza, DO

## 2022-05-10 NOTE — PATIENT INSTRUCTIONS
Favor the constellation of symptoms from the neck to the left upper extremity being related to recent outdoor activities, overuse.  There may be some component radiating from the neck, but not clearly cervical radiculopathy.  We also discussed consideration of some component of ulnar neuropathy from the elbow, and possible median nerve involvement at the wrist with some exam findings.  Considerations currently include imaging, rehab, support for areas in the left arm, potential use of medication, and future consideration for electrodiagnostic testing.  Home exercises reviewed today, focusing on some stretching for the trap and left arm.  Physical therapy is also a consideration; contact clinic if interested in PT.  Reviewed support options with overnight use of towel roll for the elbow, as well as wrist bracing overnight in neutral position.  Would suggest trying at least a towel roll at this point.  Provided a letter for work, okay to return to work as able.  Monitor symptoms over the next 1 week to begin.    If you have any further questions for your physician or physician s care team you can call [phone] and use option 3 to leave a voice message. Calls received during business hours will be returned same day.

## 2022-05-10 NOTE — PROGRESS NOTES
ASSESSMENT & PLAN    Tana was seen today for pain.    Diagnoses and all orders for this visit:    Left arm pain    Numbness and tingling in left hand      Acute issues left UE, c/w overuse.  Potential for improvement with time.  We discussed the following: symptom treatment, activity modification/rest, imaging, rehab, medication, potential for improvement with time, support for the affected area and electrodiagnostic testing. Following discussion, plan:  PT offered, HEP reviewed.  Discussed support.  Letter for work.  Questions answered. Discussed signs and symptoms that may indicate more serious issues; the patient was instructed to seek appropriate care if noted. Piedad indicates understanding of these issues and agrees with the plan.      See Patient Instructions  Patient Instructions   Favor the constellation of symptoms from the neck to the left upper extremity being related to recent outdoor activities, overuse.  There may be some component radiating from the neck, but not clearly cervical radiculopathy.  We also discussed consideration of some component of ulnar neuropathy from the elbow, and possible median nerve involvement at the wrist with some exam findings.  Considerations currently include imaging, rehab, support for areas in the left arm, potential use of medication, and future consideration for electrodiagnostic testing.  Home exercises reviewed today, focusing on some stretching for the trap and left arm.  Physical therapy is also a consideration; contact clinic if interested in PT.  Reviewed support options with overnight use of towel roll for the elbow, as well as wrist bracing overnight in neutral position.  Would suggest trying at least a towel roll at this point.  Provided a letter for work, okay to return to work as able.  Monitor symptoms over the next 1 week to begin.    If you have any further questions for your physician or physician s care team you can call [phone] and use option 3  "to leave a voice message. Calls received during business hours will be returned same day.        Axel Baeza DO  [facility] [place]    -----  Chief Complaint   Patient presents with     Left Upper Arm - Pain       SUBJECTIVE  Tana Hernandez is a/an 21 year old female who is seen as a self referral for evaluation of left shoulder and arm pain.  Pain started after working the in garden 5/7/22, and then worked the next day.  Pain from her jaw, down to her shoulder, posterior shoulder and down her arm to her fingers.  Numbness in her small and ring finger.    HX of hypermobile jonts      The patient is seen by themselves.  The patient is Right handed    Onset: 3 day(s) ago. Patient describes injury as working in the garden   Location of Pain: left upper extremity   Worsened by: sleeping on left side, use of arm   Better with: massage, stretching   Treatments tried: massage,   Associated symptoms: numbness, tingling and weakness of hand    Orthopedic/Surgical history: possible dislocated shoulder   Social History/Occupation:      No family history pertinent to patient's problem today.     **  Has had similar symptoms to this off/on during day prior to this episode. Typically resolves with time.  This episode worse, called in to work due to symptoms.    **  More numbness and pain today from diving here. Started with numbness to small finger. Now more in thumb as well. Pain appears to radiate from neck left side laterally.  Currently numbness more in ulnar 2 digits.      **  Also with some hx left IT band issues. In left LE, pain more deep, lateral hip and thigh.      REVIEW OF SYSTEMS:  Review of Systems   Skin: Negative for color change, pallor, rash and wound.   Neurological: Positive for numbness and paresthesias.   All other systems reviewed and are negative.        OBJECTIVE:  Ht 1.676 m (5' 6\")   Wt 136.1 kg (300 lb)   BMI 48.42 kg/m     General: healthy, alert and " in no distress  HEENT: no scleral icterus or conjunctival erythema  Skin: no suspicious lesions or rash. No jaundice.  CV: distal perfusion intact   Resp: normal respiratory effort without conversational dyspnea   Psych: normal mood and affect  Gait: normal steady gait with appropriate coordination and balance   Neuro: Normal tone      Cervical Spine Exam    Range of Motion:       forward flexion full, no change         extension full, no change        lateral flexion to right with some beneficial stretching on left, some radiation to left UE; to left no change    Tender:      paraspinal muscles       upper border of trapezius  left    Strength:     C4 (shoulder shrug)  symmetric 5/5       C5 (shoulder abduction) symmetric 5/5       C6 (elbow flexion) symmetric 5/5       C7 (elbow extension) symmetric 5/5       C8 (finger abduction, thumb flexion) symmetric 5/5    Reflexes:      C5 (biceps) symmetric        C6 (supinator) symmetric        C7 (triceps) symmetric   intact    Special Tests:     equivocal Spurling to right, min N/T to left, some stretching sensation left UE; to left mild improvement in left UE tingling    Skin:     well perfused       capillary refill brisk    Lymphatics:      no edema noted in the upper extremities           Left Shoulder exam    ROM:      forward flexion         abduction        internal rotation        external rotation   Grossly full, min tightness sensation end of ER          Left Elbow exam:    Inspection:     no ecchymosis       no edema or effusion    ROM:     full with flexion, extension, forearm supination and pronation.  Full flexion with some forearm pain, also similar pain with forearm rotation    Tender: cubital tunnel    Tinel positive medial elbow, reproduces symptoms to forearm, hand        Hand/wrist (left):    Inspection:  No deformity noted.  No swelling. No ecchymosis.    Motion:  Wrist flexion   Wrist extension   Wrist radial deviation   Wrist ulnar deviation    Digit motion   full    Strength:  Grossly intact    Radial pulses normal, +2/4, capillary refill brisk.    Tinel pos. Phalen pos (more ulnar 2 digits). Reverse Phalen some wrist discomfort, no change in hand.          RADIOLOGY:  None today, without injury and with exam noted above.              35 minutes spent on the date of the encounter doing chart review, history and exam, documentation and further activities per the note

## 2022-05-10 NOTE — LETTER
[facility] [place]  [address]  [place] [address]  Phone: [phone]  Fax: [phone]      May 10, 2022      RE: Tana Hernandez  [id number] [address]        To whom it may concern:    Tana Hernandez was seen in clinic today for left upper extremity musculoskeletal issues. The employee is ABLE to return to work today.    When the patient returns to work, there are no formal work restrictions.  Plan developed together to address the issues, and we will monitor the course over the next 1 week.      Sincerely,            Axel Baeza DO, CAQ

## 2022-05-15 ENCOUNTER — HEALTH MAINTENANCE LETTER (OUTPATIENT)
Age: 22
End: 2022-05-15

## 2022-06-07 NOTE — TELEPHONE ENCOUNTER
DIAGNOSIS: Right knee pain/swelling, limited range of motion. Hasn't improved much since it happened.   APPOINTMENT DATE: 06/21/2022   NOTES STATUS DETAILS   OFFICE NOTE from referring provider N/A    OFFICE NOTE from other specialist Internal 09/27/2018 Dr Krishnan [facility]   10/15/2018 physical therapy Deanne Hart [facility]      DISCHARGE SUMMARY from hospital N/A    DISCHARGE REPORT from the ER N/A    OPERATIVE REPORT N/A    EMG report N/A    MEDICATION LIST N/A    MRI N/A    DEXA (osteoporosis/bone health) N/A    CT SCAN N/A    XRAYS (IMAGES & REPORTS) Internal 09/27/2018 RT knee

## 2022-06-10 ENCOUNTER — VIRTUAL VISIT (OUTPATIENT)
Dept: PSYCHIATRY | Facility: CLINIC | Age: 22
End: 2022-06-10
Payer: COMMERCIAL

## 2022-06-10 ENCOUNTER — VIRTUAL VISIT (OUTPATIENT)
Dept: PSYCHOLOGY | Facility: CLINIC | Age: 22
End: 2022-06-10
Payer: COMMERCIAL

## 2022-06-10 DIAGNOSIS — F90.9 ATTENTION DEFICIT HYPERACTIVITY DISORDER (ADHD), UNSPECIFIED ADHD TYPE: ICD-10-CM

## 2022-06-10 DIAGNOSIS — F60.3 BORDERLINE PERSONALITY DISORDER (H): ICD-10-CM

## 2022-06-10 DIAGNOSIS — F41.9 ANXIETY: ICD-10-CM

## 2022-06-10 DIAGNOSIS — F90.9 ATTENTION DEFICIT HYPERACTIVITY DISORDER (ADHD), UNSPECIFIED ADHD TYPE: Primary | ICD-10-CM

## 2022-06-10 DIAGNOSIS — F32.1 CURRENT MODERATE EPISODE OF MAJOR DEPRESSIVE DISORDER, UNSPECIFIED WHETHER RECURRENT (H): Primary | ICD-10-CM

## 2022-06-10 DIAGNOSIS — F39 MOOD DISORDER (H): ICD-10-CM

## 2022-06-10 PROCEDURE — 90832 PSYTX W PT 30 MINUTES: CPT | Mod: GT | Performed by: PSYCHOLOGIST

## 2022-06-10 PROCEDURE — 99214 OFFICE O/P EST MOD 30 MIN: CPT | Mod: GT | Performed by: PSYCHIATRY & NEUROLOGY

## 2022-06-10 RX ORDER — LAMOTRIGINE 100 MG/1
100 TABLET ORAL DAILY
Qty: 90 TABLET | Refills: 0 | Status: SHIPPED | OUTPATIENT
Start: 2022-06-10 | End: 2022-11-22

## 2022-06-10 RX ORDER — DEXTROAMPHETAMINE SACCHARATE, AMPHETAMINE ASPARTATE MONOHYDRATE, DEXTROAMPHETAMINE SULFATE, AMPHETAMINE SULFATE 12.5; 12.5; 12.5; 12.5 MG/1; MG/1; MG/1; MG/1
50 CAPSULE, EXTENDED RELEASE ORAL DAILY PRN
Qty: 30 CAPSULE | Refills: 0 | Status: SHIPPED | OUTPATIENT
Start: 2022-08-09 | End: 2022-09-22

## 2022-06-10 RX ORDER — FLUOXETINE 40 MG/1
40 CAPSULE ORAL DAILY
Qty: 90 CAPSULE | Refills: 0 | Status: SHIPPED | OUTPATIENT
Start: 2022-06-10 | End: 2022-11-22

## 2022-06-10 RX ORDER — DEXTROAMPHETAMINE SACCHARATE, AMPHETAMINE ASPARTATE MONOHYDRATE, DEXTROAMPHETAMINE SULFATE, AMPHETAMINE SULFATE 12.5; 12.5; 12.5; 12.5 MG/1; MG/1; MG/1; MG/1
50 CAPSULE, EXTENDED RELEASE ORAL DAILY PRN
Qty: 30 CAPSULE | Refills: 0 | Status: SHIPPED | OUTPATIENT
Start: 2022-07-10 | End: 2024-01-08

## 2022-06-10 RX ORDER — DEXTROAMPHETAMINE SACCHARATE, AMPHETAMINE ASPARTATE MONOHYDRATE, DEXTROAMPHETAMINE SULFATE, AMPHETAMINE SULFATE 12.5; 12.5; 12.5; 12.5 MG/1; MG/1; MG/1; MG/1
50 CAPSULE, EXTENDED RELEASE ORAL DAILY PRN
Qty: 30 CAPSULE | Refills: 0 | Status: SHIPPED | OUTPATIENT
Start: 2022-06-10 | End: 2023-01-29

## 2022-06-10 ASSESSMENT — PATIENT HEALTH QUESTIONNAIRE - PHQ9
10. IF YOU CHECKED OFF ANY PROBLEMS, HOW DIFFICULT HAVE THESE PROBLEMS MADE IT FOR YOU TO DO YOUR WORK, TAKE CARE OF THINGS AT HOME, OR GET ALONG WITH OTHER PEOPLE: VERY DIFFICULT
SUM OF ALL RESPONSES TO PHQ QUESTIONS 1-9: 10
SUM OF ALL RESPONSES TO PHQ QUESTIONS 1-9: 10

## 2022-06-10 NOTE — Clinical Note
Please note in patient chart this patient's psychiatric care is being discharged from me and returned to their primary care provider. Thanks!

## 2022-06-10 NOTE — Clinical Note
Psychiatry Update/Consult. I am returning Tana Hernandez's psychiatric care back to you for ongoing management and medication prescribing.  Patient has graduated from [facility] due to ongoing stability and readiness to return to primary care provider. Future medication refills will come from you (I gave 3 months today). I recommended that the patient follow up with you in about 2-3 months for a mental health visit. More details about treatment plan are in my note. If you have any questions or concerns, please don't hesitate to reach out.   Thanks for the referral! It was a pleasure working with Tana Hernandez.   aPtty Franco, DO [facility]

## 2022-06-10 NOTE — PROGRESS NOTES
"Tana Hernandez is a 21 year old year old who is being evaluated via a billable video visit.      How would you like to obtain your AVS? MyChart  If you are dropped from the video visit, the video invite should be resent to: Text to cell phone: see Epic  Will anyone else be joining your video visit? No     Telemedicine Visit: The patient's condition can be safely assessed and treated via synchronous audio and visual telemedicine encounter.      Reason for Telemedicine Visit: Covid-19 Pandemic    Originating Site (Patient Location): Patient's home     Distant Site (Provider Location): Provider Remote Setting    Mode of Communication:  Video Conference via [employer]    As the provider I attest to compliance with applicable laws and regulations related to telemedicine.        Outpatient Psychiatric Progress Note    Name: Tana Hernandez   : 2000                    Primary Care Provider: Gina Ferraro NP   Therapist: Seeing outside therapist    PHQ-9 scores:  PHQ-9 SCORE 2022 2022 6/10/2022   PHQ-9 Total Score - - -   PHQ-9 Total Score [facility] 8 (Mild depression) - 10 (Moderate depression)   PHQ-9 Total Score 8 9 10       ABDIRAHMAN-7 scores:  ABDIRAHMAN-7 SCORE 2021   Total Score 7 (mild anxiety) 13 (moderate anxiety) -   Total Score 7 13 6       Patient Identification:  Patient is a 21 year old, partnered / significant other   or   or  female  who presents for return visit with me.  Patient is currently employed part time. Patient attended the phone/video session alone. Patient prefers to be called: \"Piedad\".    Interim History:  I last saw Tana Hernandez for outpatient psychiatry return visit on 2022. During that appointment, we:     Continue Mydayis ER 50 mg daily as needed for ADHD.     Continue fluoxetine 40 mg daily for mood, anxiety.     Continue lamotrigine 100 mg daily for BPD and Bipolar Disorder.      My direct HIPPA " compliant fax number for forms (or upload to [employer]): [phone]    6/10: Patient has not been seen since January.  Some ups and downs since then but reports she has had some increased stressors per usual.  Continuing to work.  Has not yet gone back to school but they did accept her appeal letter.  Was actually taking too much lamotrigine but is now taking normal amount.  She is on a wait list for DBT but will start later this month.  She is looking forward to this.  She may be ending her romantic relationship.  Continues to have intermittent passive thoughts of suicide but denies any intent or plan to harm herself.  Declined making a safety plan with the [facility] today.  [facility] did send some safety resources.  She overall feels like her medications are still in an okay place.  Overall feels relatively stable.  No problematic drug or alcohol use.    Per [facility], Dr. Lang Martin, during today's team-based visit:  See [facility] note for more details.    Past medication trials include but are not limited to:   Abilify -increased blood sugars  Fluoxetine -stopped by patient  Adderall 3 bead -current  Citalopram  Wellbutrin - hives  Hydroxyzine  Likely others she cannot remember  Lamotrigine - stopped by patient  vyvanse - not helpful    Psychiatric ROS:  Tana Hernandez reports mood has been: Overall stable  Anxiety has been: Overall stable  Sleep has been: Overall stable  Halima sxs: None  Psychosis sxs: None  ADHD/ADD sxs: Stable  PTSD sxs: None  PHQ9 and GAD7 scores were reviewed today if completed.   Medication side effects: Denies  Current stressors include: Symptoms and see HPI above   Coping mechanisms and supports include: Therapy, Family, Hobbies and Friends    Current medications include:   Current Outpatient Medications   Medication Sig     ACE/ARB/ARNI NOT PRESCRIBED (INTENTIONAL) Please choose reason not prescribed from choices below. (Patient not taking: Reported on 3/15/2022)     Amphet-Dextroamphet 3-Bead ER 50 MG CP24  Take 50 mg by mouth daily as needed (ADHD) Need appt for refill.     ASPIRIN NOT PRESCRIBED (INTENTIONAL) Please choose reason not prescribed from choices below. (Patient not taking: Reported on 3/15/2022)     FLUoxetine (PROZAC) 40 MG capsule Take 1 capsule (40 mg) by mouth daily     lamoTRIgine (LAMICTAL) 100 MG tablet Take 1 tablet (100 mg) by mouth daily Start with half-tab for 2 weeks then increase to full tab.     metFORMIN (GLUCOPHAGE-XR) 500 MG 24 hr tablet Take 2 tablets (1,000 mg) by mouth 2 times daily (with meals)     STATIN NOT PRESCRIBED (INTENTIONAL) Please choose reason not prescribed from choices below. (Patient not taking: Reported on 3/15/2022)     No current facility-administered medications for this visit.       The [place] Prescription Monitoring Program has been reviewed and there are no concerns about diversionary activity for controlled substances at this time.   05/04/2022 05/03/2022 05/06/2022 2   Mydayis Er 50 Mg Capsule  30.00 30 [place] [id number] [facility] ([id number]) 0/0  [place] Ins [place]  04/05/2022 04/05/2022 04/05/2022 2   Mydayis Er 50 Mg Capsule  30.00 30 De Cav [id number] [facility] ([id number]) 0/0  [place]  03/04/2022 12/20/2021 03/04/2022 2   Mydayis Er 50 Mg Capsule  30.00 30 Al Bau [id number] [facility] ([id number]) 0/0  [place]  01/31/2022 10/26/2021 02/01/2022 2   Mydayis Er 50 Mg Capsule  30.00 30 Al Bau [id number] [facility] ([id number]) 0/0  [place]      Past Medical/Surgical History:  Past Medical History:   Diagnosis Date     Anxiety      Depressive disorder       has a past medical history of Anxiety and Depressive disorder.    Social History:  Reviewed. No changes to social history except as noted above in HPI.    Vital Signs:   None. This is phone/video visit.     Labs:  Most recent laboratory results reviewed and no new labs.     Review of Systems:  10 systems (general, cardiovascular, respiratory, eyes, ENT, endocrine, GI, , M/S, neurological) were reviewed. Most pertinent finding(s) is/are: denies fever, cough,  headaches, shortness of breath, chest pain, N/V, constipation/diarrhea, trouble urinating, aches and pains. The remaining systems are all unremarkable.    Mental Status Examination (limited as this is by phone/video):  Appearance: Awake, alert, appears stated age, no acute distress, well-groomed   Attitude:  cooperative, pleasant   Motor: No gross abnormalities observed via video, not formally tested   Oriented to:  person, place, time, and situation  Attention Span and Concentration:  normal  Speech:  clear, coherent, regular rate, rhythm, and volume  Language: intact  Mood: A little up and down but overall okay  Affect: Overall appropriate and mood congruent  Associations:  no loose associations  Thought Process:  overall logical, linear and goal oriented  Thought Content:  no evidence of suicidal ideation or homicidal ideation, no evidence of psychotic thought, no auditory hallucinations present and no visual hallucinations present  Recent and Remote Memory:  Intact to interview. Not formally assessed. No amnesia.  Fund of Knowledge: appropriate  Insight:  good  Judgment:  intact, adequate for safety  Impulse Control:  intact    Suicide Risk Assessment:  Today Tana Hernandez reports no acute suicidal ideation. Based on all available evidence including the factors cited above, Tana Hernandez does not appear to be at imminent risk for self-harm, does not meet criteria for a 72-hr hold, and therefore remains appropriate for ongoing outpatient level of care.  A thorough assessment of risk factors related to suicide and self-harm have been reviewed and are noted above. The patient convincingly denies suicidality on several occasions. Local community safety resources printed and reviewed for patient to use if needed. There was no deceit detected, and the patient presented in a manner that was believable.     DSM5 Diagnosis:  Attention-Deficit/Hyperactivity Disorder  314.01 (F90.9) Unspecified Attention -Deficit /  Hyperactivity Disorder  301.83 (F60.3) Borderline Personality Disorder   Mood disorder, unspecified (continue to monitor for bipolar disorder-I am highly suspicious her symptoms are historically likely more related to diagnoses of ADHD, depression, and borderline personality disorder)    Medical comorbidities include:   Patient Active Problem List    Diagnosis Date Noted     Diabetes mellitus, type 2 (H) 09/13/2021     Priority: Medium     Vitamin deficiency 02/05/2019     Priority: Medium     Class 3 severe obesity due to excess calories without serious comorbidity with body mass index (BMI) of 45.0 to 49.9 in adult (H) 01/04/2019     Priority: Medium     Attention deficit hyperactivity disorder (ADHD), unspecified ADHD type 09/29/2017     Priority: Medium     Major depression in complete remission (H) 10/26/2016     Priority: Medium       Psychosocial & Contextual Factors: see HPI above    Assessment:  Intake, 9/28/2021:   Tana Hernandez is a 21-year-old female with past psychiatric history including diagnoses of ADHD and bipolar disorder who presents today for psychiatric evaluation.  After chart review and interview with the patient her bipolar disorder is a little less clear.  She does meet nearly all the criteria for borderline personality disorder (we went through the criteria today).  Discussed she does meet criteria for diagnosis of borderline personality disorder.  Also discussed that borderline and bipolar disorder are highly comorbid.  Could have both, or, the borderline diagnosis might be the root of a lot of her mental health struggles.  Discussed recommendation for DBT.  She did do well on Abilify.  Abilify can be helpful as a mood stabilizer in both bipolar and borderline.  Unfortunately it was increasing her blood sugars.  I recommend a trial with lamotrigine to see if that might be better tolerated and still helpful.  She is agreeable to a trial.  We will continue fluoxetine for now but did  discuss risk of precipitating hypomanic/manic episodes and bipolar disorder.  I do think she needs to further address her disordered eating.  She does states she works on with her therapist.  Could always consider more intensive therapy at [facility], or [facility].  She is agreeable to a trial with Vyvanse and move her Adderall since she does not feel her Adderall has been as helpful as maybe an ADHD medication should be.  Discussed risks and benefits of stimulants.  Strongly recommend she continually some form of therapy, if not DBT.  No acute safety concerns today.  No problematic drug or alcohol use.    10/26/2021:  Patient overall with some improvement on lamotrigine.  Feels more stable.  Did not do as well on Vyvanse as her previous stimulant.  Went back to Mydayis.  Overall this has been a good medication for her.  We will continue to optimize therapy with lamotrigine for mood stabilization, bipolar disorder, borderline personality disorder.  She is still hopeful to get into a DBT program.  This would be strongly recommended.  No acute safety concerns.  Tolerating medications well with no negative side effects.  No rash on lamotrigine.  No problematic drug or alcohol use.    12/20/2021:   Patient fortunately not doing very well.  Her and her brother have been struggling to meet ends meet since her father has been unable to contribute to the financial burden.  Father not doing well with his own mental health.  Discussed getting into DBT for additional support during this difficult time.  DBT would also help her develop necessary coping skills and strategies to better handle everything going on.  Discussed she would definitely benefit from having a  available in a DBT program to contact when necessary.  We will restart her lamotrigine.  She will continue fluoxetine and her Mydayis.  I am agreeable to filling out any form she might need due to dropping out this semester and/or needing to go  back next semester.  No acute safety concerns today.  No problematic drug or alcohol use.    1/28/2022:  Patient overall feeling a lot better back on lamotrigine.  No negative side effects.  Tolerating well.  We will continue to look into the school situation and fax any documents if needed.  I will keep her on our docket of patients since she may need schoolwork filled out.  I will see her back in a few months.  No acute safety concerns.  No SI.  No problematic drug or alcohol use.  No med changes.    6/10/2022:  Patient with some ups and downs but overall continues to maintain relative stability on current medication regimen.  No medication changes for several months.  School accepted her paperwork.  Since there is no additional need for any paperwork and there are no medication changes at this time, her care will be returned back to her primary care provider for on going management.  Patient is encouraged to continue to follow through with DBT once off the wait list.  Continues to have some intermittent passive suicidal ideations that will be best addressed in DBT therapy.  No acute suicidal ideation or acute safety concerns.  She has no plan or intent to harm herself.  No problematic drug or alcohol use.  I continue to be more suspicious for diagnoses of depression, ADHD, borderline versus an actual diagnosis of bipolar disorder.  Patient and providers will continue to need to monitor for bipolar disorder.  It is however nice she is able to be off of Abilify to prevent alterations in blood sugar and metabolic status.  Continue to monitor blood pressures while on stimulant medication.  May need to decrease dose if blood pressures remain high, or add blood pressure medication.    Medication side effects and alternatives were reviewed. Health promotion activities recommended and reviewed today. All questions addressed. Education and counseling completed regarding risks and benefits of medications and psychotherapy  options. Recommend therapy for additional support.     Treatment Plan:    Continue Mydayis ER 50 mg daily as needed for ADHD.     Continue fluoxetine 40 mg daily for mood, anxiety.     Continue lamotrigine 100 mg daily for BPD and Bipolar Disorder.      Your psychiatric care is being returned to your primary care provider for ongoing management.    Continue all other cares per primary care provider.     Continue all other medications as reviewed per electronic medical record today.     Safety plan reviewed. To the Emergency Department as needed or call after hours crisis line at [phone] or [phone]. [place] Crisis Text Line. Text [place] to 524439 or Suicide LifeLine Chat: suicidepreventionlifeline.org/chat    Strongest recommendation is for DBT.     Follow up with primary care provider as planned or for acute medical concerns.    [employer] may be used to communicate with your provider, but this is not intended to be used for emergencies.    Thank you for our work together in the Psychiatry Collaborative Care Model at [facility]. This is our last visit and I am returning your care back to your Primary Care Provider Gina Ferraro NP . If you are not doing well, please contact your Primary Care Provider office.     Have previously discussed Lamictal (lamotrigine) can cause serious rashes including Patino-Eric syndrome which may requiring hospitalization and discontinuation of treatment. If any signs of a rash occur, please see your Primary Care Provider or a dermatologist immediately.     Have previously discussed risks of stimulant medication including, but not limited to, decreased appetite, risk of tics (and that they may be lasting), trouble sleeping, cardiac risks such as increased heart rate and blood pressure, and rare risk of sudden cardiac death.  Also risk of addiction/tolerance/dependence.    Administrative Billing:   Phone Call/Video Duration: 7 Minutes  Start: 11:22a  Stop:  11:30a    Time spent with patient was 7 minutes and greater than 50% of time or 4 minutes was spent in counseling and coordination of care regarding above diagnoses and treatment plan.     Patient Status:  The patient is being returned to the referring provider for ongoing care and medication prescribing.  The patient can be re-referred back to this service for further consultation if needed in the future.    Signed:   Patty Franco, DO  [facility]    Disclaimer: This note consists of symbols derived from keyboarding, dictation and/or voice recognition software. As a result, there may be errors in the script that have gone undetected. Please consider this when interpreting information found in this chart.

## 2022-06-10 NOTE — PATIENT INSTRUCTIONS
Treatment Plan:  Continue Mydayis ER 50 mg daily as needed for ADHD.   Continue fluoxetine 40 mg daily for mood, anxiety.   Continue lamotrigine 100 mg daily for BPD and Bipolar Disorder.    Your psychiatric care is being returned to your primary care provider for ongoing management.  Continue all other cares per primary care provider.   Continue all other medications as reviewed per electronic medical record today.   Safety plan reviewed. To the Emergency Department as needed or call after hours crisis line at [phone] or [phone]. [place] Crisis Text Line. Text [place] to 109106 or Suicide LifeLine Chat: suicidepreventionlifeline.org/chat  Strongest recommendation is for DBT.   Follow up with primary care provider as planned or for acute medical concerns.  MyChart may be used to communicate with your provider, but this is not intended to be used for emergencies.    Thank you for our work together in the Psychiatry Collaborative Care Model at [facility]. This is our last visit and I am returning your care back to your Primary Care Provider Gina Ferraro NP . If you are not doing well, please contact your Primary Care Provider office.      Have previously discussed Lamictal (lamotrigine) can cause serious rashes including Patino-Eric syndrome which may requiring hospitalization and discontinuation of treatment. If any signs of a rash occur, please see your Primary Care Provider or a dermatologist immediately.     Have previously discussed risks of stimulant medication including, but not limited to, decreased appetite, risk of tics (and that they may be lasting), trouble sleeping, cardiac risks such as increased heart rate and blood pressure, and rare risk of sudden cardiac death.  Also risk of addiction/tolerance/dependence.

## 2022-06-10 NOTE — PROGRESS NOTES
[facility]ative [facility] Psychiatry Service  Vicky 10, 2022      Behavioral Health Clinician Progress Note    Patient Name: Tana Hernandez           Service Type:  Individual      Service Location:   [facility]     Session Start Time: 10:30am  Session End Time: 10:50am      Session Length: 16 - 37      Attendees: Patient     Service Modality:  Video Visit:      Provider verified identity through the following two step process.  Patient provided:  Patient is known previously to provider    Telemedicine Visit: The patient's condition can be safely assessed and treated via synchronous audio and visual telemedicine encounter.      Reason for Telemedicine Visit: Services only offered telehealth    Originating Site (Patient Location): Patient's home    Distant Site (Provider Location): Provider Remote Setting- Home Office    Consent:  The patient/guardian has verbally consented to: the potential risks and benefits of telemedicine (video visit) versus in person care; bill my insurance or make self-payment for services provided; and responsibility for payment of non-covered services.     Patient would like the video invitation sent by:  My Chart    Mode of Communication:  Video Conference via [facility]    As the provider I attest to compliance with applicable laws and regulations related to telemedicine.    Visit Activities (Refresh list every visit): [facility] [place] Only    Diagnostic Assessment Date: 09/28/2021  Treatment Plan Review Date: 09/10/2022  See Flowsheets for today's PHQ-9 and ABDIRAHMAN-7 results  Previous PHQ-9:   PHQ-9 SCORE 1/28/2022 2/25/2022 6/10/2022   PHQ-9 Total Score - - -   PHQ-9 Total Score MyChart 8 (Mild depression) - 10 (Moderate depression)   PHQ-9 Total Score 8 9 10     Previous ABDIRAHMAN-7:   ABDIRAHMAN-7 SCORE 9/28/2021 12/20/2021 2/25/2022   Total Score 7 (mild anxiety) 13 (moderate anxiety) -   Total Score 7 13 6       JEREMIAH LEVEL:  JEREMIAH Score (Last Two) 1/7/2019   JEREMIAH Raw Score 40   Activation Score 100   JEREMIAH Level 4  "      DATA  Extended Session (60+ minutes): No  Interactive Complexity: No  Crisis: No  H Patient: No    Treatment Objective(s) Addressed in This Session:  Target Behavior(s): Anxiety    Anxiety: will develop more effective coping skills to manage anxiety symptoms    Current Stressors / Issues:  \"I've been up and down. I don't think it's from the medication though.\" She accidentally was taking too much lamotrigine for almost a week and felt really sluggish. Once she realized that, she got back to the proper dose and she felt better. She is on a wait list for DBT which will start later this month. She liked the brief DBT she tried in high school and is looking forward to trying it again. She noted that she may be ending a relationship which is difficult but probably best for her. This stress has resulted in some passive SI but denied any plan intent. She declined the need for a safety plan but appreciated having some crisis resources if needed. These were sent via [employer].      Progress on Treatment Objective(s) / Homework:  Satisfactory progress - PREPARATION (Decided to change - considering how); Intervened by negotiating a change plan and determining options / strategies for behavior change, identifying triggers, exploring social supports, and working towards setting a date to begin behavior change    Also provided psychoeducation about behavioral health condition, symptoms, and treatment options    Care Plan review completed: Yes    Medication Review:  Changes to psychiatric medications, see updated Medication List in EPIC.     Medication Compliance:  Yes    Changes in Health Issues:   None reported    Chemical Use Review:   Substance Use: Chemical use reviewed, no active concerns identified      Tobacco Use: No current tobacco use.      Assessment: Current Emotional / Mental Status (status of significant symptoms):  Risk status (Self / Other harm or suicidal ideation)  Patient denies a history of suicidal " ideation, suicide attempts, self-injurious behavior, homicidal ideation, homicidal behavior and and other safety concerns  Patient denies current fears or concerns for personal safety.  Patient denies current or recent suicidal ideation or behaviors.  Patient denies current or recent homicidal ideation or behaviors.  Patient denies current or recent self injurious behavior or ideation.  Patient denies other safety concerns.  A safety and risk management plan has not been developed at this time, however patient was encouraged to call [place] [phone] should there be a change in any of these risk factors.    Appearance:   Appropriate   Eye Contact:   Good   Psychomotor Behavior: Normal   Attitude:   Cooperative   Orientation:   All  Speech   Rate / Production: Normal    Volume:  Normal   Mood:    Anxious  Normal  Affect:    Appropriate   Thought Content:  Clear   Thought Form:  Coherent  Logical   Insight:    Good     Diagnoses:  1. Current moderate episode of major depressive disorder, unspecified whether recurrent (H)    2. Attention deficit hyperactivity disorder (ADHD), unspecified ADHD type    3. Anxiety    4. Borderline personality disorder (H)        Collateral Reports Completed:  Communicated with: Dr. Franco    Plan: (Homework, other):  Patient was given information about behavioral services and encouraged to schedule a follow up appointment with the clinic [facility].  She was also given information about mental health symptoms and treatment options .  CD Recommendations: No indications of CD issues. Lang Martin PsyD, LP      ______________________________________________________________________    [facility] Psychiatry Service Treatment Plan    Patient's Name: Tana Hernandez  YOB: 2000    Date of Creation: Vicky 10, 2022  Date Treatment Plan Last Reviewed/Revised: 06/10/2022    DSM5 Diagnoses: Attention-Deficit/Hyperactivity  Disorder  314.01 (F90.9) Unspecified Attention -Deficit / Hyperactivity Disorder, 311 (F32.9) Unspecified Depressive Disorder , 300.02 (F41.1) Generalized Anxiety Disorder or 301.83 (F60.3) Borderline Personality Disorder  Psychosocial / Contextual Factors: relational  PROMIS (reviewed every 90 days):   PROMIS 10-Global Health (only subscores and total score):   PROMIS-10 Scores Only 12/20/2021 6/10/2022   Global Mental Health Score 8 9   Global Physical Health Score 14 13   PROMIS TOTAL - SUBSCORES 22 22       Referral / Collaboration:  Referral to another professional/service is not indicated at this time..    Anticipated number of session for this episode of care: 5-6  Anticipation frequency of session: As determined by Dr. Franco  Anticipated Duration of each session: 16-37 minutes  Treatment plan will be reviewed in 90 days or when goals have been changed.       MeasurableTreatment Goal(s) related to diagnosis / functional impairment(s)  Goal 1: Patient will work with providers to manage symptoms    I will know I've met my goal when I'm less anxious.      Objective #A (Patient Action)  Patient will attend all appointments, take medication as prescribed.  Status: New - Date: 06/10/2022     Intervention(s)  [facility] will Monitor and assist in overcoming barriers to treatment adherence    Objective #B  Patient will consider all recommendations offered.  Status: New - Date: 06/10/2022      Intervention(s)  [facility] will educate patient on treatment options, clarify concerns, work with pt to overcome any resistance to compliance.      Patient has reviewed and agreed to the above plan.      Moises Martin PsyD  Vicky 10, 2022

## 2022-06-21 ENCOUNTER — PRE VISIT (OUTPATIENT)
Dept: ORTHOPEDICS | Facility: CLINIC | Age: 22
End: 2022-06-21
Payer: COMMERCIAL

## 2022-06-21 ENCOUNTER — OFFICE VISIT (OUTPATIENT)
Dept: ORTHOPEDICS | Facility: CLINIC | Age: 22
End: 2022-06-21
Payer: COMMERCIAL

## 2022-06-21 DIAGNOSIS — M25.561 CHRONIC PAIN OF RIGHT KNEE: Primary | ICD-10-CM

## 2022-06-21 DIAGNOSIS — G89.29 CHRONIC PAIN OF RIGHT KNEE: Primary | ICD-10-CM

## 2022-06-21 DIAGNOSIS — S83.001S PATELLAR SUBLUXATION, RIGHT, SEQUELA: ICD-10-CM

## 2022-06-21 DIAGNOSIS — M17.11 ARTHRITIS OF RIGHT KNEE: ICD-10-CM

## 2022-06-21 PROCEDURE — 99204 OFFICE O/P NEW MOD 45 MIN: CPT | Performed by: PREVENTIVE MEDICINE

## 2022-06-21 RX ORDER — CELECOXIB 100 MG/1
100 CAPSULE ORAL 2 TIMES DAILY
Qty: 60 CAPSULE | Refills: 0 | Status: SHIPPED | OUTPATIENT
Start: 2022-06-21 | End: 2022-08-04

## 2022-06-21 NOTE — PROGRESS NOTES
HISTORY OF PRESENT ILLNESS  Ms. Hernandez is a pleasant 21 year old year old female who presents to clinic today with   Tana explains that she dislocated her patella in the same knee in 2018, states she irritated her knee while gardening, the knee has been swells up every day, and her leg cramps up.  Location: right knee  Quality:  achy pain    Severity: 6/10 at worst    Duration: worse over past year, and weeks worse  Timing: occurs intermittently  Context: occurs while walking and bending knee  Modifying factors:  resting and non-use makes it better, movement and use makes it worse  Associated signs & symptoms: swelling in knee  MEDICAL HISTORY  Patient Active Problem List   Diagnosis     Major depression in complete remission (H)     Attention deficit hyperactivity disorder (ADHD), unspecified ADHD type     Class 3 severe obesity due to excess calories without serious comorbidity with body mass index (BMI) of 45.0 to 49.9 in adult (H)     Vitamin deficiency     Diabetes mellitus, type 2 (H)       Current Outpatient Medications   Medication Sig Dispense Refill     ACE/ARB/ARNI NOT PRESCRIBED (INTENTIONAL) Please choose reason not prescribed from choices below. (Patient not taking: Reported on 3/15/2022)       Amphet-Dextroamphet 3-Bead ER 50 MG CP24 Take 50 mg by mouth daily as needed (ADHD) 30 capsule 0     [START ON 7/10/2022] Amphet-Dextroamphet 3-Bead ER 50 MG CP24 Take 50 mg by mouth daily as needed (ADHD) 30 capsule 0     [START ON 8/9/2022] Amphet-Dextroamphet 3-Bead ER 50 MG CP24 Take 50 mg by mouth daily as needed (ADHD) 30 capsule 0     ASPIRIN NOT PRESCRIBED (INTENTIONAL) Please choose reason not prescribed from choices below. (Patient not taking: Reported on 3/15/2022)       FLUoxetine (PROZAC) 40 MG capsule Take 1 capsule (40 mg) by mouth daily 90 capsule 0     lamoTRIgine (LAMICTAL) 100 MG tablet Take 1 tablet (100 mg) by mouth daily 90 tablet 0     metFORMIN (GLUCOPHAGE-XR) 500 MG 24 hr tablet Take 2  tablets (1,000 mg) by mouth 2 times daily (with meals) 180 tablet 1     STATIN NOT PRESCRIBED (INTENTIONAL) Please choose reason not prescribed from choices below. (Patient not taking: Reported on 3/15/2022)         Allergies   Allergen Reactions     Wellbutrin [Bupropion] Hives       No family history on file.  Social History     Socioeconomic History     Marital status: Single   Tobacco Use     Smoking status: Never Smoker     Smokeless tobacco: Never Used   Substance and Sexual Activity     Alcohol use: No     Alcohol/week: 0.0 standard drinks     Drug use: No     Sexual activity: Yes     Partners: Male       Additional medical/Social/Surgical histories reviewed in [facility] and updated as appropriate.     REVIEW OF SYSTEMS (6/21/2022)  10 point ROS of systems including Constitutional, Eyes, Respiratory, Cardiovascular, Gastroenterology, Genitourinary, Integumentary, Musculoskeletal, Psychiatric, Allergic/Immunologic were all negative except for pertinent positives noted in my HPI.     PHYSICAL EXAM  Vital Signs: There were no vitals taken for this visit. Patient declined being weighed. There is no height or weight on file to calculate BMI.    General  - normal appearance, in no obvious distress, overweight  HEENT  - conjunctivae not injected, moist mucous membranes, normocephalic/atraumatic head, ears normal appearance, no lesions, mouth normal appearance, no scars, normal dentition and teeth present  CV  - normal popliteal pulse  Pulm  - normal respiratory pattern, non-labored  Musculoskeletal - right knee  - stance: normal gait with slight limp, no obvious leg length discrepancy, single-leg squat exhibits knee valgus, internal rotation of the hip, contralateral hip drop  - inspection: has some swelling or effusion, normal muscle tone, normal bone and joint alignment, no obvious deformity  - palpation: has medial joint line tenderness, patellar tendon non-tender, tender medial patellar facet  - ROM: 135 degrees  flexion, -5 degrees extension, not painful, crepitus with weight-bearing flexion  - strength: 5/5 in flexion, 5/5 in extension  - neuro: no sensory or motor deficit  - special tests:  (-) Lachman  (-) anterior drawer  (-) varus at 0 and 30 degrees flexion  (-) valgus at 0 and 30 degrees flexion  (+) Benny s compression test  (+) patellar grind  (-) patellar apprehension  Neuro  - no sensory or motor deficit, grossly normal coordination, normal muscle tone  Skin  - no ecchymosis, erythema, warmth, or induration, no obvious rash  Psych  - interactive, appropriate, normal mood and affect  ASSESSMENT & PLAN  22 yo female with right knee recurrent patellar subluxations, arthritis, effusion, worse    I independently reviewed the following imaging studies:  xrays shows no fractures or dislocations, PF arthritis  Discussed and ordered MRI as she has had more recurrent patellar subluxations and more difficulty with pain and swelling, concern for meniscus or cartilage injury  Knee brace recommended  RX given for celebrex  F/u after MRI  Appropriate PPE was utilized for prevention of spread of Covid-19.  Justino Garcia MD, CAQSM

## 2022-06-21 NOTE — LETTER
6/21/2022         RE: Tana Hernandez  [id number] [address] [place] [id number]        Dear Colleague,    Thank you for referring your patient, Tana Hernandez, to the [facility] [place]. Please see a copy of my visit note below.    HISTORY OF PRESENT ILLNESS  Ms. Hernandez is a pleasant 21 year old year old female who presents to clinic today with   Tana explains that she dislocated her patella in the same knee in 2018, states she irritated her knee while gardening, the knee has been swells up every day, and her leg cramps up.  Location: right knee  Quality:  achy pain    Severity: 6/10 at worst    Duration: worse over past year, and weeks worse  Timing: occurs intermittently  Context: occurs while walking and bending knee  Modifying factors:  resting and non-use makes it better, movement and use makes it worse  Associated signs & symptoms: swelling in knee  MEDICAL HISTORY  Patient Active Problem List   Diagnosis     Major depression in complete remission (H)     Attention deficit hyperactivity disorder (ADHD), unspecified ADHD type     Class 3 severe obesity due to excess calories without serious comorbidity with body mass index (BMI) of 45.0 to 49.9 in adult (H)     Vitamin deficiency     Diabetes mellitus, type 2 (H)       Current Outpatient Medications   Medication Sig Dispense Refill     ACE/ARB/ARNI NOT PRESCRIBED (INTENTIONAL) Please choose reason not prescribed from choices below. (Patient not taking: Reported on 3/15/2022)       Amphet-Dextroamphet 3-Bead ER 50 MG CP24 Take 50 mg by mouth daily as needed (ADHD) 30 capsule 0     [START ON 7/10/2022] Amphet-Dextroamphet 3-Bead ER 50 MG CP24 Take 50 mg by mouth daily as needed (ADHD) 30 capsule 0     [START ON 8/9/2022] Amphet-Dextroamphet 3-Bead ER 50 MG CP24 Take 50 mg by mouth daily as needed (ADHD) 30 capsule 0     ASPIRIN NOT PRESCRIBED (INTENTIONAL) Please choose reason not prescribed from choices below. (Patient not  taking: Reported on 3/15/2022)       FLUoxetine (PROZAC) 40 MG capsule Take 1 capsule (40 mg) by mouth daily 90 capsule 0     lamoTRIgine (LAMICTAL) 100 MG tablet Take 1 tablet (100 mg) by mouth daily 90 tablet 0     metFORMIN (GLUCOPHAGE-XR) 500 MG 24 hr tablet Take 2 tablets (1,000 mg) by mouth 2 times daily (with meals) 180 tablet 1     STATIN NOT PRESCRIBED (INTENTIONAL) Please choose reason not prescribed from choices below. (Patient not taking: Reported on 3/15/2022)         Allergies   Allergen Reactions     Wellbutrin [Bupropion] Hives       No family history on file.  Social History     Socioeconomic History     Marital status: Single   Tobacco Use     Smoking status: Never Smoker     Smokeless tobacco: Never Used   Substance and Sexual Activity     Alcohol use: No     Alcohol/week: 0.0 standard drinks     Drug use: No     Sexual activity: Yes     Partners: Male       Additional medical/Social/Surgical histories reviewed in [facility] and updated as appropriate.     REVIEW OF SYSTEMS (6/21/2022)  10 point ROS of systems including Constitutional, Eyes, Respiratory, Cardiovascular, Gastroenterology, Genitourinary, Integumentary, Musculoskeletal, Psychiatric, Allergic/Immunologic were all negative except for pertinent positives noted in my HPI.     PHYSICAL EXAM  Vital Signs: There were no vitals taken for this visit. Patient declined being weighed. There is no height or weight on file to calculate BMI.    General  - normal appearance, in no obvious distress, overweight  HEENT  - conjunctivae not injected, moist mucous membranes, normocephalic/atraumatic head, ears normal appearance, no lesions, mouth normal appearance, no scars, normal dentition and teeth present  CV  - normal popliteal pulse  Pulm  - normal respiratory pattern, non-labored  Musculoskeletal - right knee  - stance: normal gait with slight limp, no obvious leg length discrepancy, single-leg squat exhibits knee valgus, internal rotation of the hip,  contralateral hip drop  - inspection: has some swelling or effusion, normal muscle tone, normal bone and joint alignment, no obvious deformity  - palpation: has medial joint line tenderness, patellar tendon non-tender, tender medial patellar facet  - ROM: 135 degrees flexion, -5 degrees extension, not painful, crepitus with weight-bearing flexion  - strength: 5/5 in flexion, 5/5 in extension  - neuro: no sensory or motor deficit  - special tests:  (-) Lachman  (-) anterior drawer  (-) varus at 0 and 30 degrees flexion  (-) valgus at 0 and 30 degrees flexion  (+) Benny s compression test  (+) patellar grind  (-) patellar apprehension  Neuro  - no sensory or motor deficit, grossly normal coordination, normal muscle tone  Skin  - no ecchymosis, erythema, warmth, or induration, no obvious rash  Psych  - interactive, appropriate, normal mood and affect  ASSESSMENT & PLAN  20 yo female with right knee recurrent patellar subluxations, arthritis, effusion, worse    I independently reviewed the following imaging studies:  xrays shows no fractures or dislocations, PF arthritis  Discussed and ordered MRI as she has had more recurrent patellar subluxations and more difficulty with pain and swelling, concern for meniscus or cartilage injury  Knee brace recommended  RX given for celebrex  F/u after MRI  Appropriate PPE was utilized for prevention of spread of Covid-19.  Justino Garcia MD, [facility]          Again, thank you for allowing me to participate in the care of your patient.        Sincerely,        Justino Garcia MD

## 2022-06-28 ENCOUNTER — MYC MEDICAL ADVICE (OUTPATIENT)
Dept: FAMILY MEDICINE | Facility: CLINIC | Age: 22
End: 2022-06-28

## 2022-06-28 ENCOUNTER — OFFICE VISIT (OUTPATIENT)
Dept: FAMILY MEDICINE | Facility: CLINIC | Age: 22
End: 2022-06-28
Payer: COMMERCIAL

## 2022-06-28 VITALS
SYSTOLIC BLOOD PRESSURE: 142 MMHG | WEIGHT: 290 LBS | DIASTOLIC BLOOD PRESSURE: 90 MMHG | RESPIRATION RATE: 22 BRPM | OXYGEN SATURATION: 100 % | TEMPERATURE: 97 F | HEART RATE: 105 BPM | BODY MASS INDEX: 46.81 KG/M2

## 2022-06-28 DIAGNOSIS — F32.1 CURRENT MODERATE EPISODE OF MAJOR DEPRESSIVE DISORDER, UNSPECIFIED WHETHER RECURRENT (H): ICD-10-CM

## 2022-06-28 DIAGNOSIS — T78.40XA ALLERGIC REACTION, INITIAL ENCOUNTER: ICD-10-CM

## 2022-06-28 DIAGNOSIS — R79.89 ELEVATED TSH: ICD-10-CM

## 2022-06-28 DIAGNOSIS — K21.9 GASTROESOPHAGEAL REFLUX DISEASE WITHOUT ESOPHAGITIS: ICD-10-CM

## 2022-06-28 DIAGNOSIS — E11.65 TYPE 2 DIABETES MELLITUS WITH HYPERGLYCEMIA, WITHOUT LONG-TERM CURRENT USE OF INSULIN (H): Primary | ICD-10-CM

## 2022-06-28 DIAGNOSIS — F90.9 ATTENTION DEFICIT HYPERACTIVITY DISORDER (ADHD), UNSPECIFIED ADHD TYPE: ICD-10-CM

## 2022-06-28 DIAGNOSIS — Z32.00 ENCOUNTER FOR PREGNANCY TEST, RESULT UNKNOWN: Primary | ICD-10-CM

## 2022-06-28 LAB
ALBUMIN SERPL-MCNC: 3.8 G/DL (ref 3.4–5)
ALP SERPL-CCNC: 102 U/L (ref 40–150)
ALT SERPL W P-5'-P-CCNC: 172 U/L (ref 0–50)
AST SERPL W P-5'-P-CCNC: 103 U/L (ref 0–45)
BILIRUB DIRECT SERPL-MCNC: 0.2 MG/DL (ref 0–0.2)
BILIRUB SERPL-MCNC: 0.6 MG/DL (ref 0.2–1.3)
CREAT UR-MCNC: 11 MG/DL
HBA1C MFR BLD: 6.5 % (ref 0–5.6)
MICROALBUMIN UR-MCNC: <5 MG/L
MICROALBUMIN/CREAT UR: NORMAL MG/G{CREAT}
PAP-ABSTRACT: NORMAL
PROT SERPL-MCNC: 7.1 G/DL (ref 6.8–8.8)
TSH SERPL DL<=0.005 MIU/L-ACNC: 3.22 MU/L (ref 0.4–4)

## 2022-06-28 PROCEDURE — 83036 HEMOGLOBIN GLYCOSYLATED A1C: CPT | Performed by: NURSE PRACTITIONER

## 2022-06-28 PROCEDURE — 82043 UR ALBUMIN QUANTITATIVE: CPT | Performed by: NURSE PRACTITIONER

## 2022-06-28 PROCEDURE — 84443 ASSAY THYROID STIM HORMONE: CPT | Performed by: NURSE PRACTITIONER

## 2022-06-28 PROCEDURE — 99214 OFFICE O/P EST MOD 30 MIN: CPT | Performed by: NURSE PRACTITIONER

## 2022-06-28 PROCEDURE — 36415 COLL VENOUS BLD VENIPUNCTURE: CPT | Performed by: NURSE PRACTITIONER

## 2022-06-28 PROCEDURE — 99207 PR FOOT EXAM NO CHARGE: CPT | Performed by: NURSE PRACTITIONER

## 2022-06-28 PROCEDURE — 96127 BRIEF EMOTIONAL/BEHAV ASSMT: CPT | Performed by: NURSE PRACTITIONER

## 2022-06-28 PROCEDURE — 80076 HEPATIC FUNCTION PANEL: CPT | Performed by: NURSE PRACTITIONER

## 2022-06-28 RX ORDER — METFORMIN HCL 500 MG
1000 TABLET, EXTENDED RELEASE 24 HR ORAL 2 TIMES DAILY WITH MEALS
Qty: 180 TABLET | Refills: 1 | Status: SHIPPED | OUTPATIENT
Start: 2022-06-28 | End: 2022-10-26

## 2022-06-28 RX ORDER — FAMOTIDINE 40 MG/1
40 TABLET, FILM COATED ORAL DAILY
Qty: 90 TABLET | Refills: 1 | Status: SHIPPED | OUTPATIENT
Start: 2022-06-28 | End: 2023-01-26

## 2022-06-28 ASSESSMENT — PAIN SCALES - GENERAL: PAINLEVEL: MODERATE PAIN (4)

## 2022-06-28 ASSESSMENT — PATIENT HEALTH QUESTIONNAIRE - PHQ9
SUM OF ALL RESPONSES TO PHQ QUESTIONS 1-9: 8
SUM OF ALL RESPONSES TO PHQ QUESTIONS 1-9: 8
10. IF YOU CHECKED OFF ANY PROBLEMS, HOW DIFFICULT HAVE THESE PROBLEMS MADE IT FOR YOU TO DO YOUR WORK, TAKE CARE OF THINGS AT HOME, OR GET ALONG WITH OTHER PEOPLE: SOMEWHAT DIFFICULT

## 2022-06-28 NOTE — PROGRESS NOTES
"  Assessment & Plan     Type 2 diabetes mellitus with hyperglycemia, without long-term current use of insulin (H)  On metformin 1000mg bid though forgets to take it in the afternoon some days.  Has lost 23lbs with dietary and lifestyle changes.  Recheck labs today.  Foot exam benign.  Due for eye exam.     Intentionally not presribed asa, statin, ace due to age  - metFORMIN (GLUCOPHAGE XR) 500 MG 24 hr tablet; Take 2 tablets (1,000 mg) by mouth 2 times daily (with meals)    Current moderate episode of major depressive disorder, unspecified whether recurrent (H)  PHQ-9 is 8.  Was followed by Psychiatry= stable- will return to me for medications now.    - [place] BEHAV ASSMT W/SCORE & DOCD/STAND INSTRUMENT    Attention deficit hyperactivity disorder (ADHD), unspecified ADHD type  On 50mg adderall.  Has been stable- with Psychiatary- now with me to prescribe    Elevated TSH  recheck  - TSH with free T4 reflex; Future    Gastroesophageal reflux disease without esophagitis  Add pepcid and lifestyle/ dietary changes  - famotidine (PEPCID) 40 MG tablet; Take 1 tablet (40 mg) by mouth daily for 90 days    Allergic reaction, initial encounter  Referral allergy for testing.   - Adult Allergy/Asthma Referral; Future    35 minutes spent on the date of the encounter doing chart review, review of test results, interpretation of tests, patient visit and documentation        BMI:   Estimated body mass index is 46.81 kg/m  as calculated from the following:    Height as of 5/10/22: 1.676 m (5' 6\").    Weight as of this encounter: 131.5 kg (290 lb).   Weight management plan: Discussed healthy diet and exercise guidelines        Return in about 6 months (around 12/28/2022) for recheck with PCP.    Gina Ferraro NP  [facility] Hospital[facility]IETTA Herbert is a 22 year old, presenting for the following health issues:  Follow Up      History of Present Illness       Reason for visit:  Recheck blood, discuss meds,    She " eats 2-3 servings of fruits and vegetables daily.She consumes 1 sweetened beverage(s) daily.She exercises with enough effort to increase her heart rate 10 to 19 minutes per day.  She exercises with enough effort to increase her heart rate 4 days per week. She is missing 5 dose(s) of medications per week.  She is not taking prescribed medications regularly due to side effects and remembering to take.    Today's PHQ-9         PHQ-9 Total Score: 8    PHQ-9 Q9 Thoughts of better off dead/self-harm past 2 weeks :   Not at all    How difficult have these problems made it for you to do your work, take care of things at home, or get along with other people: Somewhat difficult     Answers for HPI/ROS submitted by the patient on 6/28/2022  If you checked off any problems, how difficult have these problems made it for you to do your work, take care of things at home, or get along with other people?: Somewhat difficult  PHQ9 TOTAL SCORE: 8  What is the reason for your visit today? : Recheck blood, discuss meds,  How many servings of fruits and vegetables do you eat daily?: 2-3  On average, how many sweetened beverages do you drink each day (Examples: soda, juice, sweet tea, etc.  Do NOT count diet or artificially sweetened beverages)?: 1  How many minutes a day do you exercise enough to make your heart beat faster?: 10 to 19  How many days a week do you exercise enough to make your heart beat faster?: 4  How many days per week do you miss taking your medication?: 5  What makes it hard for you to take your medication every day?: side effects, remembering to take    Reaction with oregeno oil at work.  Works at [employer].  Face itchy and throat did not swell up.  Not sure what from that time.      Has lost 23 lbs.  Started new job where very active.  Moved back to [place].  Lifestyle changes- gardening.      Review of Systems   Constitutional, HEENT, cardiovascular, pulmonary, GI, , musculoskeletal, neuro, skin, endocrine and  psych systems are negative, except as otherwise noted.      Objective    BP (!) 142/90   Pulse 105   Temp 97  F (36.1  C) (Temporal)   Resp 22   Wt 131.5 kg (290 lb)   SpO2 100%   BMI 46.81 kg/m    Body mass index is 46.81 kg/m .  Physical Exam   GENERAL: healthy, alert and no distress  NECK: no adenopathy, no asymmetry, masses, or scars and thyroid normal to palpation  RESP: lungs clear to auscultation - no rales, rhonchi or wheezes  CV: regular rate and rhythm, normal S1 S2, no S3 or S4, no murmur, click or rub, no peripheral edema and peripheral pulses strong  ABDOMEN: soft, nontender, no hepatosplenomegaly, no masses and bowel sounds normal  MS: no gross musculoskeletal defects noted, no edema    No results found for this or any previous visit (from the past 24 hour(s)).                .  ..

## 2022-07-03 ENCOUNTER — ANCILLARY PROCEDURE (OUTPATIENT)
Dept: MRI IMAGING | Facility: CLINIC | Age: 22
End: 2022-07-03
Attending: PREVENTIVE MEDICINE
Payer: COMMERCIAL

## 2022-07-03 PROCEDURE — 73721 MRI JNT OF LWR EXTRE W/O DYE: CPT | Mod: RT | Performed by: RADIOLOGY

## 2022-07-05 ENCOUNTER — OFFICE VISIT (OUTPATIENT)
Dept: ORTHOPEDICS | Facility: CLINIC | Age: 22
End: 2022-07-05
Payer: COMMERCIAL

## 2022-07-05 DIAGNOSIS — M54.16 LUMBAR RADICULAR PAIN: ICD-10-CM

## 2022-07-05 DIAGNOSIS — M17.11 ARTHRITIS OF RIGHT KNEE: Primary | ICD-10-CM

## 2022-07-05 PROCEDURE — 99214 OFFICE O/P EST MOD 30 MIN: CPT | Performed by: PREVENTIVE MEDICINE

## 2022-07-05 NOTE — PROGRESS NOTES
HISTORY OF PRESENT ILLNESS  Ms. Hernandez is a pleasant 22 year old year old female who presents to clinic today with ongoing right knee pain  Tana explains that she had her MRI done for her knee and would like to go over the results with   Having some low back pain as well  Pain radiates into right leg at times    Location: right knee   Quality:  achy pain    Severity: 7/10 at worst    Medications help a little    MEDICAL HISTORY  Patient Active Problem List   Diagnosis     Major depression in complete remission (H)     Attention deficit hyperactivity disorder (ADHD), unspecified ADHD type     Class 3 severe obesity due to excess calories without serious comorbidity with body mass index (BMI) of 45.0 to 49.9 in adult (H)     Vitamin deficiency     Diabetes mellitus, type 2 (H)       Current Outpatient Medications   Medication Sig Dispense Refill     ACE/ARB/ARNI NOT PRESCRIBED (INTENTIONAL) Please choose reason not prescribed from choices below. (Patient not taking: No sig reported)       Amphet-Dextroamphet 3-Bead ER 50 MG CP24 Take 50 mg by mouth daily as needed (ADHD) 30 capsule 0     [START ON 7/10/2022] Amphet-Dextroamphet 3-Bead ER 50 MG CP24 Take 50 mg by mouth daily as needed (ADHD) 30 capsule 0     [START ON 8/9/2022] Amphet-Dextroamphet 3-Bead ER 50 MG CP24 Take 50 mg by mouth daily as needed (ADHD) 30 capsule 0     ASPIRIN NOT PRESCRIBED (INTENTIONAL) Please choose reason not prescribed from choices below. (Patient not taking: No sig reported)       celecoxib (CELEBREX) 100 MG capsule Take 1 capsule (100 mg) by mouth 2 times daily 60 capsule 0     famotidine (PEPCID) 40 MG tablet Take 1 tablet (40 mg) by mouth daily for 90 days 90 tablet 1     FLUoxetine (PROZAC) 40 MG capsule Take 1 capsule (40 mg) by mouth daily 90 capsule 0     lamoTRIgine (LAMICTAL) 100 MG tablet Take 1 tablet (100 mg) by mouth daily 90 tablet 0     metFORMIN (GLUCOPHAGE XR) 500 MG 24 hr tablet Take 2 tablets (1,000 mg) by  mouth 2 times daily (with meals) 180 tablet 1     STATIN NOT PRESCRIBED (INTENTIONAL) Please choose reason not prescribed from choices below. (Patient not taking: No sig reported)         Allergies   Allergen Reactions     Wellbutrin [Bupropion] Hives       No family history on file.  Social History     Socioeconomic History     Marital status: Single   Tobacco Use     Smoking status: Never Smoker     Smokeless tobacco: Never Used   Substance and Sexual Activity     Alcohol use: No     Alcohol/week: 0.0 standard drinks     Drug use: No     Sexual activity: Yes     Partners: Male       Additional medical/Social/Surgical histories reviewed in [facility] and updated as appropriate.     REVIEW OF SYSTEMS (7/5/2022)  10 point ROS of systems including Constitutional, Eyes, Respiratory, Cardiovascular, Gastroenterology, Genitourinary, Integumentary, Musculoskeletal, Psychiatric, Allergic/Immunologic were all negative except for pertinent positives noted in my HPI.     PHYSICAL EXAM  VSS  General  - normal appearance, in no obvious distress  HEENT  - conjunctivae not injected, moist mucous membranes, normocephalic/atraumatic head, ears normal appearance, no lesions, mouth normal appearance, no scars, normal dentition and teeth present  CV  - normal peripheral perfusion  Pulm  - normal respiratory pattern, non-labored  Musculoskeletal - lumbar spine  - stance: normal gait without limp, no obvious leg length discrepancy, normal heel and toe walk  - inspection: normal bone and joint alignment, no obvious scoliosis  - palpation: no paravertebral or bony tenderness  - ROM: flexion exacerbates pain, normal extension, sidebending, rotation  - strength: lower extremities 5/5 in all planes  - special tests:  (+) straight leg raise- right  (+) slump test  Neuro  - patellar and Achilles DTRs 2+ bilaterally, no lower extremity sensory deficit throughout L5 distribution, grossly normal coordination, normal muscle tone  Skin  - no ecchymosis,  erythema, warmth, or induration, no obvious rash  Psych  - interactive, appropriate, normal mood and affect  Right knee: has some pain with PF compression, flexion of knee, mild effusion, ligaments intact, joint lines normal    ASSESSMENT & PLAN  21 yo female with lumbar radicular pain, right knee pain due to arthritis, patellofemoral and radicular pain, not resolved    I independently reviewed the following imaging studies:  MRI knee: normal, except for some PF maltracking evidence  Lumbar MRI shows some ddd, disc herniations from 2019  Discussed and given knee brace  HEP given   PT ordered  F/u 1 month      Appropriate PPE was utilized for prevention of spread of Covid-19.  Justino Garcia MD, CAM

## 2022-07-05 NOTE — LETTER
7/5/2022         RE: Tana Hernandez  [address] [address] [place] [id number]        Dear Colleague,    Thank you for referring your patient, Tana Hernandez, to the [facility] [place]. Please see a copy of my visit note below.    HISTORY OF PRESENT ILLNESS  Ms. Hernandez is a pleasant 22 year old year old female who presents to clinic today with ongoing right knee pain  Tana explains that she had her MRI done for her knee and would like to go over the results with   Having some low back pain as well  Pain radiates into right leg at times    Location: right knee   Quality:  achy pain    Severity: 7/10 at worst    Medications help a little    MEDICAL HISTORY  Patient Active Problem List   Diagnosis     Major depression in complete remission (H)     Attention deficit hyperactivity disorder (ADHD), unspecified ADHD type     Class 3 severe obesity due to excess calories without serious comorbidity with body mass index (BMI) of 45.0 to 49.9 in adult (H)     Vitamin deficiency     Diabetes mellitus, type 2 (H)       Current Outpatient Medications   Medication Sig Dispense Refill     ACE/ARB/ARNI NOT PRESCRIBED (INTENTIONAL) Please choose reason not prescribed from choices below. (Patient not taking: No sig reported)       Amphet-Dextroamphet 3-Bead ER 50 MG CP24 Take 50 mg by mouth daily as needed (ADHD) 30 capsule 0     [START ON 7/10/2022] Amphet-Dextroamphet 3-Bead ER 50 MG CP24 Take 50 mg by mouth daily as needed (ADHD) 30 capsule 0     [START ON 8/9/2022] Amphet-Dextroamphet 3-Bead ER 50 MG CP24 Take 50 mg by mouth daily as needed (ADHD) 30 capsule 0     ASPIRIN NOT PRESCRIBED (INTENTIONAL) Please choose reason not prescribed from choices below. (Patient not taking: No sig reported)       celecoxib (CELEBREX) 100 MG capsule Take 1 capsule (100 mg) by mouth 2 times daily 60 capsule 0     famotidine (PEPCID) 40 MG tablet Take 1 tablet (40 mg) by mouth daily for 90 days 90  tablet 1     FLUoxetine (PROZAC) 40 MG capsule Take 1 capsule (40 mg) by mouth daily 90 capsule 0     lamoTRIgine (LAMICTAL) 100 MG tablet Take 1 tablet (100 mg) by mouth daily 90 tablet 0     metFORMIN (GLUCOPHAGE XR) 500 MG 24 hr tablet Take 2 tablets (1,000 mg) by mouth 2 times daily (with meals) 180 tablet 1     STATIN NOT PRESCRIBED (INTENTIONAL) Please choose reason not prescribed from choices below. (Patient not taking: No sig reported)         Allergies   Allergen Reactions     Wellbutrin [Bupropion] Hives       No family history on file.  Social History     Socioeconomic History     Marital status: Single   Tobacco Use     Smoking status: Never Smoker     Smokeless tobacco: Never Used   Substance and Sexual Activity     Alcohol use: No     Alcohol/week: 0.0 standard drinks     Drug use: No     Sexual activity: Yes     Partners: Male       Additional medical/Social/Surgical histories reviewed in [facility] and updated as appropriate.     REVIEW OF SYSTEMS (7/5/2022)  10 point ROS of systems including Constitutional, Eyes, Respiratory, Cardiovascular, Gastroenterology, Genitourinary, Integumentary, Musculoskeletal, Psychiatric, Allergic/Immunologic were all negative except for pertinent positives noted in my HPI.     PHYSICAL EXAM  VSS  General  - normal appearance, in no obvious distress  HEENT  - conjunctivae not injected, moist mucous membranes, normocephalic/atraumatic head, ears normal appearance, no lesions, mouth normal appearance, no scars, normal dentition and teeth present  CV  - normal peripheral perfusion  Pulm  - normal respiratory pattern, non-labored  Musculoskeletal - lumbar spine  - stance: normal gait without limp, no obvious leg length discrepancy, normal heel and toe walk  - inspection: normal bone and joint alignment, no obvious scoliosis  - palpation: no paravertebral or bony tenderness  - ROM: flexion exacerbates pain, normal extension, sidebending, rotation  - strength: lower extremities 5/5  in all planes  - special tests:  (+) straight leg raise- right  (+) slump test  Neuro  - patellar and Achilles DTRs 2+ bilaterally, no lower extremity sensory deficit throughout L5 distribution, grossly normal coordination, normal muscle tone  Skin  - no ecchymosis, erythema, warmth, or induration, no obvious rash  Psych  - interactive, appropriate, normal mood and affect  Right knee: has some pain with PF compression, flexion of knee, mild effusion, ligaments intact, joint lines normal    ASSESSMENT & PLAN  23 yo female with lumbar radicular pain, right knee pain due to arthritis, patellofemoral and radicular pain, not resolved    I independently reviewed the following imaging studies:  MRI knee: normal, except for some PF maltracking evidence  Lumbar MRI shows some ddd, disc herniations from 2019  Discussed and given knee brace  HEP given   PT ordered  F/u 1 month      Appropriate PPE was utilized for prevention of spread of Covid-19.  Justino Garcia MD, [facility]          Again, thank you for allowing me to participate in the care of your patient.        Sincerely,        Justino Garcia MD

## 2022-07-05 NOTE — LETTER
July 5, 2022      Tana Hernandez  [address] [facility] [address]            To whom it may concern:  Piedad is under my care for a medial condition, she can return to work as of today, 7/5/22, with the following restrictions, which will be in effect until I re-evaluate her by 8/7/22.    No lifting animals 40 pounds or greater in weight.  Otherwise she can fulfill the rest of her duties for her job position.  Please contact me with any questions or concerns.        Sincerely,      Justino Garcia MD

## 2022-07-06 ENCOUNTER — HOSPITAL ENCOUNTER (OUTPATIENT)
Dept: PHYSICAL THERAPY | Facility: CLINIC | Age: 22
Setting detail: THERAPIES SERIES
Discharge: HOME | End: 2022-07-06
Attending: PREVENTIVE MEDICINE
Payer: COMMERCIAL

## 2022-07-06 DIAGNOSIS — M17.11 ARTHRITIS OF RIGHT KNEE: ICD-10-CM

## 2022-07-06 DIAGNOSIS — M54.16 LUMBAR RADICULAR PAIN: ICD-10-CM

## 2022-07-06 PROCEDURE — 97161 PT EVAL LOW COMPLEX 20 MIN: CPT | Mod: GP | Performed by: PHYSICAL THERAPIST

## 2022-07-06 PROCEDURE — 97140 MANUAL THERAPY 1/> REGIONS: CPT | Mod: GP | Performed by: PHYSICAL THERAPIST

## 2022-07-06 PROCEDURE — 97110 THERAPEUTIC EXERCISES: CPT | Mod: GP | Performed by: PHYSICAL THERAPIST

## 2022-07-07 NOTE — PROGRESS NOTES
07/06/22 1500   General Information   Type of Visit Initial OP Ortho PT Evaluation   Start of Care Date 07/06/22   Referring Physician Dr. Justino Garcia   Orders Evaluate and Treat   Date of Order 07/05/22   Certification Required? Yes   Medical Diagnosis Lumbar radiculopathy, R knee pain   Surgical/Medical history reviewed Yes   Weight-Bearing Status - LUE full weight-bearing   Weight-Bearing Status - RUE full weight-bearing   Weight-Bearing Status - LLE full weight-bearing   Weight-Bearing Status - RLE full weight-bearing   Body Part(s)   Body Part(s) Lumbar Spine/SI;Knee   Presentation and Etiology   Pertinent history of current problem (include personal factors and/or comorbidities that impact the POC) Pt reports gradual onset of R knee pain over the past month.  Pt reports she works as a  and is in awkward positions fairly frequently.  Pt notes that doing some gardening has irritated her knees as well.  Pt reports back pain as well. Pt reports previously had L low back pain with L radiating symptoms.  Pt reports occasionally dislocated her R knee.  PMH: depression, obesity, DMII, ADHD.   Impairments A. Pain;D. Decreased ROM;F. Decreased strength and endurance;G. Impaired balance   Functional Limitations perform activities of daily living;perform required work activities;perform desired leisure / sports activities   Symptom Location Lateral R knee typically, occasionally medial aspect of R knee. Low back and coccyx that occasionally radiates into B LE's.   How/Where did it occur From insidious onset   Onset date of current episode/exacerbation 01/01/19   Chronicity Chronic   Pain rating (0-10 point scale) Best (/10);Worst (/10)   Best (/10) R knee 3/10.  Low back 3/10   Worst (/10) R knee 7/10. Lumbar 3/10.   Pain quality B. Dull;C. Aching   Frequency of pain/symptoms C. With activity   Pain/symptoms are: Worse in the morning   Pain/symptoms exacerbated by G. Certain positions;I. Bending;B. Walking;C.  Lifting   Pain/symptoms eased by E. Changing positions;F. Certain positions;C. Rest;B. Walking   Progression of symptoms since onset: Worsened   Current / Previous Interventions   Diagnostic Tests: MRI   Current Level of Function   Current Community Support Family/friend caregiver   Patient role/employment history A. Employed   Employment Comments .   [facility]   Home/community accessibility No concerns   Current equipment-Gait/Locomotion None   Current equipment-ADL None   Fall Risk Screen   Fall screen completed by PT   Have you fallen 2 or more times in the past year? No   Have you fallen and had an injury in the past year? No   Abuse Screen (yes response referral indicated)   Feels Unsafe at Home or Work/School no   Feels Threatened by Someone no   Does Anyone Try to Keep You From Having Contact with Others or Doing Things Outside Your Home? no   Physical Signs of Abuse Present no   Patient needs abuse support services and resources No   Knee Objective Findings   Palpation Tender to palpation at distal ITB and medial hamstrings   Observation Slight pocket of swelling at Lateral patella.   Side (if bilateral, select both right and left) Right   Right Knee Extension AROM 0   Right Knee Flexion AROM 130   Right Knee Flexion Strength 5   Right Knee Extension Strength 5   Right Hip Abduction Strength 5   Right Quad Set Strength 5   R VMO Strength 4   Lumbar Spine/SI Objective Findings   Flexion %   Extension %   Right Side Bending %   Left Side Bending %   Repeated Extension-Standing ROM Reduced distal symptoms   Repeated Flexion-Standing ROM Increased L posterior thigh pain   Hip Flexion (L2) Strength 5   Hip Abduction Strength 4   Hip Adduction Strength 5   Hip Extension Strength 5   Knee Flexion Strength 5   Knee Extension (L3) Strength 5   Ankle Dorsiflexion (L4) Strength 5   Great Toe Extension (L5) Strength 5   Ankle Plantar Flexion (S1) Strength 5    SLR 90 degrees   Crossover SLR negative   Slump Test positive of L   Planned Therapy Interventions   Planned Therapy Interventions gait training;joint mobilization;neuromuscular re-education;ROM;strengthening;stretching   Planned Modality Interventions   Planned Modality Interventions Cryotherapy;Electrical stimulation;Hot packs;TENS;Ultrasound   Planned Modality Interventions Comments as needed   Clinical Impression   Criteria for Skilled Therapeutic Interventions Met yes, treatment indicated   PT Diagnosis Low back pain and R knee pain.   Influenced by the following impairments Pain, impaired lumbar motor control, weakness.   Functional limitations due to impairments Kneeling, crouching, squatting, bending forward   Clinical Presentation Stable/Uncomplicated   Clinical Presentation Rationale Clinical judgement   Clinical Decision Making (Complexity) Low complexity   Therapy Frequency 1 time/week   Predicted Duration of Therapy Intervention (days/wks) 8 weeks   Risk & Benefits of therapy have been explained Yes   Patient, Family & other staff in agreement with plan of care Yes   Clinical Impression Comments Pt is a 22 y.o. female who presented to PT with symptoms of R knee and Low back pain.  Pt will benefit from skilled PT to improve LE strength, lumbar motor control, and to establish symptom management techniques.   Education Assessment   Preferred Learning Style Demonstration   Barriers to Learning No barriers   ORTHO GOALS   PT Ortho Eval Goals 1;2;3   Ortho Goal 1   Goal Identifier HEP   Goal Description Pt will be independent with HEP in order to improve lumbar motor control and LE strength   Target Date 08/31/22   Ortho Goal 2   Goal Identifier Work   Goal Description Pt will report no more than 2/10 pain in R knee while performing work duties in order to work as a . comfortably.   Target Date 08/31/22   Ortho Goal 3   Goal Identifier Symptom management   Goal Description Pt will demonstrate  understanding of symptom management techniques such as position of relief and taping of knee and utilize them to keep symptoms no more than 2/10 in R knee or low back.   Total Evaluation Time   PT Eval, Low Complexity Minutes (35388) 15   Therapy Certification   Certification date from 07/06/22   Certification date to 08/31/22   Medical Diagnosis Low back pain and R knee pain.

## 2022-07-08 ENCOUNTER — MYC REFILL (OUTPATIENT)
Dept: PSYCHIATRY | Facility: CLINIC | Age: 22
End: 2022-07-08

## 2022-07-08 NOTE — TELEPHONE ENCOUNTER
"Refill request r'cd from [facility] via fax for Lamotrigine 100 mg denied due to request too soon. Faxed \"not authorized\" back to pharmacy.        Kasey Boone RN July 8, 2022 9:29 AM    "

## 2022-07-11 ENCOUNTER — MYC MEDICAL ADVICE (OUTPATIENT)
Dept: ORTHOPEDICS | Facility: CLINIC | Age: 22
End: 2022-07-11

## 2022-07-14 NOTE — TELEPHONE ENCOUNTER
I sent her a message in [employer].  Dr Garcia     Start Pred Acetate 6x/daily.  F/U with [facility] in 1 week and JTM in 2 weeks.

## 2022-07-24 ENCOUNTER — APPOINTMENT (OUTPATIENT)
Dept: GENERAL RADIOLOGY | Facility: CLINIC | Age: 22
End: 2022-07-24
Attending: NURSE PRACTITIONER
Payer: COMMERCIAL

## 2022-07-24 ENCOUNTER — HOSPITAL ENCOUNTER (EMERGENCY)
Facility: CLINIC | Age: 22
Discharge: HOME | End: 2022-07-24
Attending: NURSE PRACTITIONER | Admitting: NURSE PRACTITIONER
Payer: COMMERCIAL

## 2022-07-24 VITALS
SYSTOLIC BLOOD PRESSURE: 140 MMHG | RESPIRATION RATE: 18 BRPM | TEMPERATURE: 98.4 F | DIASTOLIC BLOOD PRESSURE: 67 MMHG | BODY MASS INDEX: 46.65 KG/M2 | HEART RATE: 80 BPM | OXYGEN SATURATION: 99 % | WEIGHT: 289 LBS

## 2022-07-24 DIAGNOSIS — R42 DIZZINESS: ICD-10-CM

## 2022-07-24 DIAGNOSIS — R07.9 CHEST PAIN, UNSPECIFIED TYPE: ICD-10-CM

## 2022-07-24 DIAGNOSIS — R68.83 CHILLS: ICD-10-CM

## 2022-07-24 LAB
ALBUMIN SERPL-MCNC: 3.7 G/DL (ref 3.4–5)
ALP SERPL-CCNC: 95 U/L (ref 40–150)
ALT SERPL W P-5'-P-CCNC: 126 U/L (ref 0–50)
ANION GAP SERPL CALCULATED.3IONS-SCNC: 7 MMOL/L (ref 3–14)
AST SERPL W P-5'-P-CCNC: 58 U/L (ref 0–45)
BASOPHILS # BLD AUTO: 0 10E3/UL (ref 0–0.2)
BASOPHILS NFR BLD AUTO: 1 %
BILIRUB SERPL-MCNC: 0.3 MG/DL (ref 0.2–1.3)
BUN SERPL-MCNC: 12 MG/DL (ref 7–30)
CALCIUM SERPL-MCNC: 9.3 MG/DL (ref 8.5–10.1)
CHLORIDE BLD-SCNC: 107 MMOL/L (ref 94–109)
CO2 SERPL-SCNC: 26 MMOL/L (ref 20–32)
CREAT SERPL-MCNC: 0.63 MG/DL (ref 0.52–1.04)
EOSINOPHIL # BLD AUTO: 0.1 10E3/UL (ref 0–0.7)
EOSINOPHIL NFR BLD AUTO: 1 %
ERYTHROCYTE [DISTWIDTH] IN BLOOD BY AUTOMATED COUNT: 13.4 % (ref 10–15)
FLUAV RNA SPEC QL NAA+PROBE: NEGATIVE
FLUBV RNA RESP QL NAA+PROBE: NEGATIVE
GFR SERPL CREATININE-BSD FRML MDRD: >90 ML/MIN/1.73M2
GLUCOSE BLD-MCNC: 130 MG/DL (ref 70–99)
HCT VFR BLD AUTO: 39.1 % (ref 35–47)
HGB BLD-MCNC: 13 G/DL (ref 11.7–15.7)
IMM GRANULOCYTES # BLD: 0 10E3/UL
IMM GRANULOCYTES NFR BLD: 0 %
LYMPHOCYTES # BLD AUTO: 2.5 10E3/UL (ref 0.8–5.3)
LYMPHOCYTES NFR BLD AUTO: 28 %
MCH RBC QN AUTO: 29.6 PG (ref 26.5–33)
MCHC RBC AUTO-ENTMCNC: 33.2 G/DL (ref 31.5–36.5)
MCV RBC AUTO: 89 FL (ref 78–100)
MONOCYTES # BLD AUTO: 0.5 10E3/UL (ref 0–1.3)
MONOCYTES NFR BLD AUTO: 6 %
NEUTROPHILS # BLD AUTO: 5.7 10E3/UL (ref 1.6–8.3)
NEUTROPHILS NFR BLD AUTO: 64 %
NRBC # BLD AUTO: 0 10E3/UL
NRBC BLD AUTO-RTO: 0 /100
PLATELET # BLD AUTO: 321 10E3/UL (ref 150–450)
POTASSIUM BLD-SCNC: 3.8 MMOL/L (ref 3.4–5.3)
PROT SERPL-MCNC: 7 G/DL (ref 6.8–8.8)
RBC # BLD AUTO: 4.39 10E6/UL (ref 3.8–5.2)
SARS-COV-2 RNA RESP QL NAA+PROBE: NEGATIVE
SODIUM SERPL-SCNC: 140 MMOL/L (ref 133–144)
TROPONIN I SERPL HS-MCNC: 4 NG/L
WBC # BLD AUTO: 8.9 10E3/UL (ref 4–11)

## 2022-07-24 PROCEDURE — 93005 ELECTROCARDIOGRAM TRACING: CPT | Performed by: NURSE PRACTITIONER

## 2022-07-24 PROCEDURE — 80053 COMPREHEN METABOLIC PANEL: CPT | Performed by: NURSE PRACTITIONER

## 2022-07-24 PROCEDURE — 36415 COLL VENOUS BLD VENIPUNCTURE: CPT | Performed by: NURSE PRACTITIONER

## 2022-07-24 PROCEDURE — C9803 HOPD COVID-19 SPEC COLLECT: HCPCS | Performed by: NURSE PRACTITIONER

## 2022-07-24 PROCEDURE — 99285 EMERGENCY DEPT VISIT HI MDM: CPT | Mod: CS | Performed by: NURSE PRACTITIONER

## 2022-07-24 PROCEDURE — 85025 COMPLETE CBC W/AUTO DIFF WBC: CPT | Performed by: NURSE PRACTITIONER

## 2022-07-24 PROCEDURE — 93010 ELECTROCARDIOGRAM REPORT: CPT | Performed by: NURSE PRACTITIONER

## 2022-07-24 PROCEDURE — 99285 EMERGENCY DEPT VISIT HI MDM: CPT | Mod: CS,25 | Performed by: NURSE PRACTITIONER

## 2022-07-24 PROCEDURE — 84484 ASSAY OF TROPONIN QUANT: CPT | Performed by: NURSE PRACTITIONER

## 2022-07-24 PROCEDURE — 71045 X-RAY EXAM CHEST 1 VIEW: CPT

## 2022-07-24 PROCEDURE — 87636 SARSCOV2 & INF A&B AMP PRB: CPT | Performed by: NURSE PRACTITIONER

## 2022-07-24 NOTE — ED TRIAGE NOTES
Here with several concerns. States she almost past out at her work today. Currently  she feels chilled and has trouble focusing. She was seen in [facility] yesterday but did not feel like they addressed her heart issues. Reports having elevated blood pressure. 155/100.      Triage Assessment     Row Name 07/24/22 9499       Triage Assessment (Adult)    Airway WDL WDL    Additional Documentation Breath Sounds (Group)       Respiratory WDL    Respiratory WDL WDL       Skin Circulation/Temperature WDL    Skin Circulation/Temperature WDL WDL       Cardiac WDL    Cardiac WDL WDL

## 2022-07-25 ENCOUNTER — PATIENT OUTREACH (OUTPATIENT)
Dept: CARE COORDINATION | Facility: CLINIC | Age: 22
End: 2022-07-25

## 2022-07-25 ENCOUNTER — TELEPHONE (OUTPATIENT)
Dept: FAMILY MEDICINE | Facility: CLINIC | Age: 22
End: 2022-07-25

## 2022-07-25 DIAGNOSIS — Z71.89 OTHER SPECIFIED COUNSELING: ICD-10-CM

## 2022-07-25 NOTE — DISCHARGE INSTRUCTIONS
Unclear cause for your symptoms.  Your labs, EKG and chest xray are reassuring.  You could have a viral syndrome which would explain the chills.  You should have recheck with Dr. Ferraro, call tomorrow to make appointment.  Return for fevers, vomiting, abdominal pain, persistent chest pain, or any other symptoms of concern.

## 2022-07-25 NOTE — PROGRESS NOTES
"Clinic Care Coordination Contact  [facility]: Post-Discharge Note  SITUATION                                                      Admission:    Admission Date: 07/24/22   Reason for Admission: Generalized weakness  Discharge:   Discharge Date: 07/24/22  Discharge Diagnosis: Chills, dizziness, chest pain unspecified type    BACKGROUND                                                      Per hospital discharge summary and inpatient provider notes:    Tana Hernandez is a 22 year old female with history of T2DM, ADHD, and depression who presents for evaluation of weakness, dizziness, chills, and feeling like she can't focus or concentrate. Intermittent central chest pain and jaw pain. Bilateral arms aching/tingling.  Symptoms started yesterday.  She was seen at [facility] emergency department yesterday for vaginal bleeding.  She had a negative UPT.  She had an ultrasound showing a small amount of internal fluid in the endometrium.  Otherwise ovaries appeared normal and no free fluid.  Patient presents here today with concern for her array of other symptoms, and concerned that her heart was not checked yesterday during her emergency department visit.  She is also concerned for her elevated blood pressure.  She was at work today and her coworkers told her she looked pale and like she was going to pass out.  She has had loose stools today.  Denies urinary symptoms, or urinary urgency/frequency.  Home meds include Adderall, metformin, fluoxetine, lamotrigine, famotidine, and tizanidine.  Non-smoker.  Drinks alcohol occasionally, but none recently.  Denies illicit drug or marijuana use.    ASSESSMENT      Enrollment  Primary Care Care Coordination Status: Declined    Discharge Assessment  How are you doing now that you are home?: \"Still not feeling great\"  How are your symptoms? (Red Flag symptoms escalate to triage hotline per guidelines): Unchanged  Do you feel your condition is stable enough to be safe at home " until your provider visit?: Yes  Does the patient have their discharge instructions? : Yes  Does the patient have questions regarding their discharge instructions? : No  Were you started on any new medications or were there changes to any of your previous medications? : No  Does the patient have all of their medications?: Yes  Do you have questions regarding any of your medications? : No  Do you have all of your needed medical supplies or equipment (DME)?  (i.e. oxygen tank, CPAP, cane, etc.): Yes  Discharge follow-up appointment scheduled within 14 calendar days? : Yes  Discharge Follow Up Appointment Date: 08/26/22  Discharge Follow Up Appointment Scheduled with?: Primary Care Provider  Is patient agreeable to assistance with scheduling? : Yes   [facility] was unable to find a hospital follow up appointment with patient's PCP within the next 2 weeks and transferred patient to her clinic to see if they are able to find a sooner appointment time.    Post-op ([facility] CTA Only)  If the patient had a surgery or procedure, do they have any questions for a nurse?: No    PLAN                                                      Outpatient Plan:      Schedule an appointment with Gina Ferraro NP (Nurse Practitioner - Family)    Follow up with [facility] (EMERGENCY MEDICINE); if symptoms worsen or any other concern      Future Appointments   Date Time Provider Department Center   7/26/2022  9:30 AM Kacey Wang, PT [facility]   8/2/2022  9:00 AM [facility]   8/5/2022 10:00 AM Osmar Haque MD [facility]   8/9/2022  8:00 AM Justino Garcia MD [facility]   8/9/2022  1:00 PM Justino Garcia MD [facility]   8/26/2022 10:30 AM Gina Ferraro NP [facility]         For any urgent concerns, please contact our 24 hour nurse triage line: [phone] ([phone])       Cassandra Pinzon  [facility] Health Worker  [facility]  [facility], [facility]  Ph: [phone]

## 2022-07-25 NOTE — ED PROVIDER NOTES
History     Chief Complaint   Patient presents with     Generalized Weakness     HPI  Tana Hernandez is a 22 year old female with history of T2DM, ADHD, and depression who presents for evaluation of weakness, dizziness, chills, and feeling like she can't focus or concentrate. Intermittent central chest pain and jaw pain. Bilateral arms aching/tingling.  Symptoms started yesterday.  She was seen at [place] emergency department yesterday for vaginal bleeding.  She had a negative UPT.  She had an ultrasound showing a small amount of internal fluid in the endometrium.  Otherwise ovaries appeared normal and no free fluid.  Patient presents here today with concern for her array of other symptoms, and concerned that her heart was not checked yesterday during her emergency department visit.  She is also concerned for her elevated blood pressure.  She was at work today and her coworkers told her she looked pale and like she was going to pass out.  She has had loose stools today.  Denies urinary symptoms, or urinary urgency/frequency.  Home meds include Adderall, metformin, fluoxetine, lamotrigine, famotidine, and tizanidine.  Non-smoker.  Drinks alcohol occasionally, but none recently.  Denies illicit drug or marijuana use.      Ultrasound 7/23/2022:  IMPRESSION:   1. Small cystic area in the endometrium measuring 0.5 x 0.2 x 0.3 cm is nonspecific. This could represent a small amount of internal fluid. Alternatively this could represent an intrauterine gestational sac. Correlation with serum beta hCG level is suggested.   2. Normal appearance of the ovaries. No free fluid.     Report signed by Dr. Jorge Rudolph       Allergies:  Allergies   Allergen Reactions     Wellbutrin [Bupropion] Hives       Problem List:    Patient Active Problem List    Diagnosis Date Noted     Diabetes mellitus, type 2 (H) 09/13/2021     Priority: Medium     Vitamin deficiency 02/05/2019     Priority: Medium     Class 3 severe obesity due to  excess calories without serious comorbidity with body mass index (BMI) of 45.0 to 49.9 in adult (H) 01/04/2019     Priority: Medium     Attention deficit hyperactivity disorder (ADHD), unspecified ADHD type 09/29/2017     Priority: Medium     Major depression in complete remission (H) 10/26/2016     Priority: Medium        Past Medical History:    Past Medical History:   Diagnosis Date     Anxiety      Depressive disorder        Past Surgical History:    History reviewed. No pertinent surgical history.    Family History:    History reviewed. No pertinent family history.    Social History:  Marital Status:  Single [1]  Social History     Tobacco Use     Smoking status: Never Smoker     Smokeless tobacco: Never Used   Substance Use Topics     Alcohol use: No     Alcohol/week: 0.0 standard drinks     Drug use: No        Medications:    ACE/ARB/ARNI NOT PRESCRIBED (INTENTIONAL)  Amphet-Dextroamphet 3-Bead ER 50 MG CP24  Amphet-Dextroamphet 3-Bead ER 50 MG CP24  [START ON 8/9/2022] Amphet-Dextroamphet 3-Bead ER 50 MG CP24  ASPIRIN NOT PRESCRIBED (INTENTIONAL)  celecoxib (CELEBREX) 100 MG capsule  famotidine (PEPCID) 40 MG tablet  FLUoxetine (PROZAC) 40 MG capsule  lamoTRIgine (LAMICTAL) 100 MG tablet  metFORMIN (GLUCOPHAGE XR) 500 MG 24 hr tablet  STATIN NOT PRESCRIBED (INTENTIONAL)  tiZANidine (ZANAFLEX) 4 MG tablet          Review of Systems  As mentioned above in the history present illness. All other systems were reviewed and are negative.    Physical Exam   BP: (!) 157/99  Pulse: 90  Temp: 98  F (36.7  C)  Weight: 131.1 kg (289 lb)  SpO2: 98 %      Physical Exam  Constitutional:       General: She is not in acute distress.     Appearance: Normal appearance. She is well-developed. She is not ill-appearing.      Comments: Wrapped in several blankets, shivering and stating she feels very cold.   HENT:      Head: Normocephalic and atraumatic.      Right Ear: External ear normal.      Left Ear: External ear normal.       Nose: Nose normal.      Mouth/Throat:      Mouth: Mucous membranes are moist.   Eyes:      Conjunctiva/sclera: Conjunctivae normal.   Cardiovascular:      Rate and Rhythm: Normal rate and regular rhythm.      Heart sounds: Normal heart sounds. No murmur heard.  Pulmonary:      Effort: Pulmonary effort is normal. No respiratory distress.      Breath sounds: Normal breath sounds.   Abdominal:      General: Bowel sounds are normal. There is no distension.      Palpations: Abdomen is soft.      Tenderness: There is no abdominal tenderness.   Musculoskeletal:         General: Normal range of motion.   Skin:     General: Skin is warm and dry.      Findings: No rash.   Neurological:      General: No focal deficit present.      Mental Status: She is alert and oriented to person, place, and time.      GCS: GCS eye subscore is 4. GCS verbal subscore is 5. GCS motor subscore is 6.      Comments: Clear speech. Speaking in full sentences.          ED Course               EKG Interpretation:      Interpreted by JAMA Wade CNP  Time reviewed: 1718   Symptoms at time of EKG: None   Rhythm: Normal sinus   Rate: Normal  Axis: Normal  Ectopy: None  Conduction: Normal  ST Segments/ T Waves: negative T-wave, abnormal precordial QRS contours, no acute ischemic changes.  Q Waves: None  Comparison to prior: No old EKG available    Clinical Impression: NSR with no acute ischemic changes.         Procedures              Results for orders placed or performed during the hospital encounter of 07/24/22 (from the past 24 hour(s))   CBC with platelets differential    Narrative    The following orders were created for panel order CBC with platelets differential.  Procedure                               Abnormality         Status                     ---------                               -----------         ------                     CBC with platelets and d...[[id number]]                      Final result                 Please  view results for these tests on the individual orders.   Comprehensive metabolic panel   Result Value Ref Range    Sodium 140 133 - 144 mmol/L    Potassium 3.8 3.4 - 5.3 mmol/L    Chloride 107 94 - 109 mmol/L    Carbon Dioxide (CO2) 26 20 - 32 mmol/L    Anion Gap 7 3 - 14 mmol/L    Urea Nitrogen 12 7 - 30 mg/dL    Creatinine 0.63 0.52 - 1.04 mg/dL    Calcium 9.3 8.5 - 10.1 mg/dL    Glucose 130 (H) 70 - 99 mg/dL    Alkaline Phosphatase 95 40 - 150 U/L    AST 58 (H) 0 - 45 U/L     (H) 0 - 50 U/L    Protein Total 7.0 6.8 - 8.8 g/dL    Albumin 3.7 3.4 - 5.0 g/dL    Bilirubin Total 0.3 0.2 - 1.3 mg/dL    GFR Estimate >90 >60 mL/min/1.73m2   Troponin I   Result Value Ref Range    Troponin I High Sensitivity 4 <54 ng/L   CBC with platelets and differential   Result Value Ref Range    WBC Count 8.9 4.0 - 11.0 10e3/uL    RBC Count 4.39 3.80 - 5.20 10e6/uL    Hemoglobin 13.0 11.7 - 15.7 g/dL    Hematocrit 39.1 35.0 - 47.0 %    MCV 89 78 - 100 fL    MCH 29.6 26.5 - 33.0 pg    MCHC 33.2 31.5 - 36.5 g/dL    RDW 13.4 10.0 - 15.0 %    Platelet Count 321 150 - 450 10e3/uL    % Neutrophils 64 %    % Lymphocytes 28 %    % Monocytes 6 %    % Eosinophils 1 %    % Basophils 1 %    % Immature Granulocytes 0 %    NRBCs per 100 WBC 0 <1 /100    Absolute Neutrophils 5.7 1.6 - 8.3 10e3/uL    Absolute Lymphocytes 2.5 0.8 - 5.3 10e3/uL    Absolute Monocytes 0.5 0.0 - 1.3 10e3/uL    Absolute Eosinophils 0.1 0.0 - 0.7 10e3/uL    Absolute Basophils 0.0 0.0 - 0.2 10e3/uL    Absolute Immature Granulocytes 0.0 <=0.4 10e3/uL    Absolute NRBCs 0.0 10e3/uL   Symptomatic; Yes; 7/23/2022 Influenza A/B & SARS-CoV2 (COVID-19) Virus PCR Multiplex Nasopharyngeal    Specimen: Nasopharyngeal; Swab   Result Value Ref Range    Influenza A PCR Negative Negative    Influenza B PCR Negative Negative    SARS CoV2 PCR Negative Negative    Narrative    Testing was performed using the natalie SARS-CoV-2 & Influenza A/B Assay on the natalie Marilou System. This test  should be ordered for the detection of SARS-CoV-2 and influenza viruses in individuals who meet clinical and/or epidemiological criteria. Test performance is unknown in asymptomatic patients. This test is for in vitro diagnostic use under the FDA EUA for laboratories certified under CLIA to perform moderate and/or high complexity testing. This test has not been FDA cleared or approved. A negative result does not rule out the presence of PCR inhibitors in the specimen or target RNA in concentration below the limit of detection for the assay. If only one viral target is positive but coinfection with multiple targets is suspected, the sample should be re-tested with another FDA cleared, approved or authorized test, if coinfection would change clinical management. [facility] are certified under the Clinical Laboratory Improvement Amendments of 1988 (CLIA-88) as  qualified to perform moderate and/or high complexity laboratory testing.   XR Chest Port 1 View    Narrative    EXAM: XR CHEST PORT 1 VIEW  LOCATION: [facility]  DATE/TIME: 7/24/2022 5:35 PM    INDICATION: chest pain  COMPARISON: None.      Impression    IMPRESSION: Heart size and pulmonary vessels normal. Lungs are clear.       Medications - No data to display  1900-patient was brought to the bathroom to collect a urine specimen, but after she came back to the room she states she forgot to collect the specimen she was in there.  She is not having any urinary symptoms and she did have a UPT that was negative yesterday so I opted to not pursue a urinalysis for her today.  1910-at bedside.  I reviewed the lab and imaging findings with patient.  She states she feels much better.  She wonders if she is having some kind of spells.  Assessments & Plan (with Medical Decision Making)   No worrisome findings on her labs or imaging today.  Her LFTs are mildly elevated, but actually better when compared to prior. Her BP  is elevated and is 140/67 at time of discharge. She should continue to monitor this and recheck with her PCP.    Plan:  Unclear cause for your symptoms.  Your labs, EKG and chest xray are reassuring.  You could have a viral syndrome which would explain the chills.  You should have recheck with Dr. Ferraro, call tomorrow to make appointment.  Return for fevers, vomiting, abdominal pain, persistent chest pain, or any other symptoms of concern.      New Prescriptions    No medications on file       Final diagnoses:   Chills   Dizziness   Chest pain, unspecified type       7/24/2022   [facility] EMERGENCY DEPT     Drea, JAMA Keller CNP  07/24/22 1315

## 2022-07-25 NOTE — TELEPHONE ENCOUNTER
Reason for Call:  Same Day Appointment, Requested [facility] worker called for patient     PCP: Gina Ferraro    Reason for visit: ED follow up      Have you been treated for this in the past? Yes    Additional comments: patient was seen in the ED on 7/23 and 7/24 ED notes state that the patient needs a follow up as soon as possible     Can we leave a detailed message on this number? YES    Phone number patient can be reached at: Cell number on file:    Telephone Information:   Mobile [phone]       Best Time: any    Call taken on 7/25/2022 at 2:11 PM by Janet Honeycutt

## 2022-08-02 ENCOUNTER — HOSPITAL ENCOUNTER (OUTPATIENT)
Dept: CARDIOLOGY | Facility: CLINIC | Age: 22
Discharge: HOME | End: 2022-08-02
Attending: NURSE PRACTITIONER | Admitting: NURSE PRACTITIONER
Payer: COMMERCIAL

## 2022-08-02 ENCOUNTER — OFFICE VISIT (OUTPATIENT)
Dept: FAMILY MEDICINE | Facility: CLINIC | Age: 22
End: 2022-08-02
Payer: COMMERCIAL

## 2022-08-02 VITALS
OXYGEN SATURATION: 98 % | TEMPERATURE: 97.6 F | SYSTOLIC BLOOD PRESSURE: 132 MMHG | HEART RATE: 120 BPM | WEIGHT: 280.13 LBS | RESPIRATION RATE: 20 BRPM | DIASTOLIC BLOOD PRESSURE: 88 MMHG | BODY MASS INDEX: 45.21 KG/M2

## 2022-08-02 DIAGNOSIS — R00.2 PALPITATIONS: ICD-10-CM

## 2022-08-02 DIAGNOSIS — R00.2 PALPITATIONS: Primary | ICD-10-CM

## 2022-08-02 DIAGNOSIS — R42 DIZZINESS: ICD-10-CM

## 2022-08-02 DIAGNOSIS — F32.1 CURRENT MODERATE EPISODE OF MAJOR DEPRESSIVE DISORDER, UNSPECIFIED WHETHER RECURRENT (H): ICD-10-CM

## 2022-08-02 DIAGNOSIS — Z11.3 SCREENING FOR STDS (SEXUALLY TRANSMITTED DISEASES): ICD-10-CM

## 2022-08-02 LAB
ALBUMIN SERPL-MCNC: 4.3 G/DL (ref 3.4–5)
ALP SERPL-CCNC: 85 U/L (ref 40–150)
ALT SERPL W P-5'-P-CCNC: 174 U/L (ref 0–50)
ANION GAP SERPL CALCULATED.3IONS-SCNC: 7 MMOL/L (ref 3–14)
AST SERPL W P-5'-P-CCNC: 100 U/L (ref 0–45)
B-HCG SERPL-ACNC: <1 IU/L (ref 0–5)
BILIRUB SERPL-MCNC: 0.7 MG/DL (ref 0.2–1.3)
BUN SERPL-MCNC: 8 MG/DL (ref 7–30)
CALCIUM SERPL-MCNC: 9.6 MG/DL (ref 8.5–10.1)
CHLORIDE BLD-SCNC: 103 MMOL/L (ref 94–109)
CO2 SERPL-SCNC: 26 MMOL/L (ref 20–32)
CREAT SERPL-MCNC: 0.84 MG/DL (ref 0.52–1.04)
ERYTHROCYTE [DISTWIDTH] IN BLOOD BY AUTOMATED COUNT: 13.2 % (ref 10–15)
GFR SERPL CREATININE-BSD FRML MDRD: >90 ML/MIN/1.73M2
GLUCOSE BLD-MCNC: 114 MG/DL (ref 70–99)
HCT VFR BLD AUTO: 42.2 % (ref 35–47)
HGB BLD-MCNC: 14 G/DL (ref 11.7–15.7)
MCH RBC QN AUTO: 29.8 PG (ref 26.5–33)
MCHC RBC AUTO-ENTMCNC: 33.2 G/DL (ref 31.5–36.5)
MCV RBC AUTO: 90 FL (ref 78–100)
PLATELET # BLD AUTO: 338 10E3/UL (ref 150–450)
POTASSIUM BLD-SCNC: 3.9 MMOL/L (ref 3.4–5.3)
PROT SERPL-MCNC: 7.8 G/DL (ref 6.8–8.8)
RBC # BLD AUTO: 4.7 10E6/UL (ref 3.8–5.2)
SODIUM SERPL-SCNC: 136 MMOL/L (ref 133–144)
WBC # BLD AUTO: 8.6 10E3/UL (ref 4–11)

## 2022-08-02 PROCEDURE — 96127 BRIEF EMOTIONAL/BEHAV ASSMT: CPT | Performed by: NURSE PRACTITIONER

## 2022-08-02 PROCEDURE — 93242 EXT ECG>48HR<7D RECORDING: CPT

## 2022-08-02 PROCEDURE — 85027 COMPLETE CBC AUTOMATED: CPT | Performed by: NURSE PRACTITIONER

## 2022-08-02 PROCEDURE — 36415 COLL VENOUS BLD VENIPUNCTURE: CPT | Performed by: NURSE PRACTITIONER

## 2022-08-02 PROCEDURE — 99214 OFFICE O/P EST MOD 30 MIN: CPT | Performed by: NURSE PRACTITIONER

## 2022-08-02 PROCEDURE — 80053 COMPREHEN METABOLIC PANEL: CPT | Performed by: NURSE PRACTITIONER

## 2022-08-02 PROCEDURE — 84702 CHORIONIC GONADOTROPIN TEST: CPT | Performed by: NURSE PRACTITIONER

## 2022-08-02 RX ORDER — IBUPROFEN 200 MG
TABLET ORAL
COMMUNITY

## 2022-08-02 RX ORDER — CETIRIZINE HYDROCHLORIDE 10 MG/1
TABLET ORAL
COMMUNITY

## 2022-08-02 ASSESSMENT — PATIENT HEALTH QUESTIONNAIRE - PHQ9
10. IF YOU CHECKED OFF ANY PROBLEMS, HOW DIFFICULT HAVE THESE PROBLEMS MADE IT FOR YOU TO DO YOUR WORK, TAKE CARE OF THINGS AT HOME, OR GET ALONG WITH OTHER PEOPLE: SOMEWHAT DIFFICULT
SUM OF ALL RESPONSES TO PHQ QUESTIONS 1-9: 10
SUM OF ALL RESPONSES TO PHQ QUESTIONS 1-9: 10

## 2022-08-02 ASSESSMENT — PAIN SCALES - GENERAL: PAINLEVEL: MODERATE PAIN (4)

## 2022-08-02 NOTE — PROGRESS NOTES
Assessment & Plan     Palpitations/ dizziness  Serum hcg is negative- done for possible cystic structure on pelvic us.  Has had heavy periods but otherwise us is normal.  Check labs.  EKG and troponin normal at outside facility.  Normal orthostatic vitals here.  Will obtain Zio patch and have her keep a symptoms diary.  Follow up in clinic if no improvement, worsening symptoms, or concerns.   - HCG Quantitative Pregnancy, Blood ([id number]); Future  - Adult Leadless EKG Monitor 3 to 7 Days; Future  - CBC with platelets; Future  - Comprehensive metabolic panel (BMP + Alb, Alk Phos, ALT, AST, Total. Bili, TP); Future  - HCG Quantitative Pregnancy, Blood ([id number])  - CBC with platelets  - Comprehensive metabolic panel (BMP + Alb, Alk Phos, ALT, AST, Total. Bili, TP)      Current moderate episode of major depressive disorder, unspecified whether recurrent (H)  Followed by Psychiatry  - HI BEHAV ASSMT W/SCORE & DOCD/STAND INSTRUMENT    35 minutes spent on the date of the encounter doing chart review, interpretation of tests, patient visit and documentation        Depression Screening Follow Up    PHQ 8/2/2022   PHQ-9 Total Score 10   Q9: Thoughts of better off dead/self-harm past 2 weeks Several days   F/U: Thoughts of suicide or self-harm Yes   F/U: Self harm-plan No   F/U: Self-harm action No   F/U: Safety concerns No     Last PHQ-9 8/2/2022   1.  Little interest or pleasure in doing things 1   2.  Feeling down, depressed, or hopeless 1   3.  Trouble falling or staying asleep, or sleeping too much 1   4.  Feeling tired or having little energy 1   5.  Poor appetite or overeating 2   6.  Feeling bad about yourself 1   7.  Trouble concentrating 1   8.  Moving slowly or restless 1   Q9: Thoughts of better off dead/self-harm past 2 weeks 1   PHQ-9 Total Score 10   Difficulty at work, home, or with people -   In the past two weeks have you had thoughts of suicide or self harm? Yes   Do you have concerns about your personal  "safety or the safety of others? No   In the past 2 weeks have you thought about a plan or had intention to harm yourself? No   In the past 2 weeks have you acted on these thoughts in any way? No       No follow-ups on file.    Gina Ferraro NP  [facility] OBDULIO Herbert is a 22 year old, presenting for the following health issues:  ER visit follow up       HPI     Post Discharge Outreach 7/25/2022   Admission Date 7/24/2022   Reason for Admission Generalized weakness   Discharge Date 7/24/2022   Discharge Diagnosis Chills, dizziness, chest pain unspecified type   How are you doing now that you are home? \"Still not feeling great\"   How are your symptoms? (Red Flag symptoms escalate to triage hotline per guidelines) Unchanged   Do you feel your condition is stable enough to be safe at home until your provider visit? Yes   Does the patient have their discharge instructions?  Yes   Does the patient have questions regarding their discharge instructions?  No   Were you started on any new medications or were there changes to any of your previous medications?  No   Does the patient have all of their medications? Yes   Do you have questions regarding any of your medications?  No   Do you have all of your needed medical supplies or equipment (DME)?  (i.e. oxygen tank, CPAP, cane, etc.) Yes   Discharge follow-up appointment scheduled within 14 calendar days?  Yes   Discharge Follow Up Appointment Date 8/26/2022   Discharge Follow Up Appointment Scheduled with? Primary Care Provider     She has hypermobility syndrome.  When sits or stands always feels blood pressure go up and dizziness and feels she could maybe have POTS.    Had sat for lunch and felt dizzy - put head on table.  Co-worker felt she was really pale- sweaty, eyes glossy.  Was \"freezing\" through warm in room.  Had brother come pick her up.  Felt so bad she was laying on the floor.  Warmed up finally in sun.  Temperature was " normal.      First epidose felt very dizzy, felt like going to pass out.  Hands felt cold felt very weak/ heavy.  Seeing starts.  Happened second time as well.  Last A1C Hgb A1c was 6.5.    Went through menstrual cup in 3-4 hours.  Had nexplanon removed.  Removed March 3/15/22  Has not had menstrual cramps in some time.  Did have bleeding after that  Pelvic us with possible cystic structure vs. Gest sac-=  Neg     Is feeling some irregular heart beat- more flip flop during those episodes.  Happens with caffeine also.      Plan of care communicated with patient      Review of Systems   Constitutional, HEENT, cardiovascular, pulmonary, GI, , musculoskeletal, neuro, skin, endocrine and psych systems are negative, except as otherwise noted.      Objective    /88   Pulse 120   Temp 97.6  F (36.4  C) (Temporal)   Resp 20   Wt 127.1 kg (280 lb 2 oz)   LMP 07/22/2022   SpO2 98%   Breastfeeding No   BMI 45.21 kg/m    Body mass index is 45.21 kg/m .  Physical Exam   GENERAL: healthy, alert and no distress  NECK: no adenopathy, no asymmetry, masses, or scars and thyroid normal to palpation  RESP: lungs clear to auscultation - no rales, rhonchi or wheezes  CV: regular rate and rhythm, normal S1 S2, no S3 or S4, no murmur, click or rub, no peripheral edema and peripheral pulses strong  ABDOMEN: soft, nontender, no hepatosplenomegaly, no masses and bowel sounds normal  MS: no gross musculoskeletal defects noted, no edema    Results for orders placed or performed in visit on 08/02/22 (from the past 24 hour(s))   HCG Quantitative Pregnancy, Blood (DQB990)   Result Value Ref Range    hCG Quantitative <1 0 - 5 IU/L   CBC with platelets   Result Value Ref Range    WBC Count 8.6 4.0 - 11.0 10e3/uL    RBC Count 4.70 3.80 - 5.20 10e6/uL    Hemoglobin 14.0 11.7 - 15.7 g/dL    Hematocrit 42.2 35.0 - 47.0 %    MCV 90 78 - 100 fL    MCH 29.8 26.5 - 33.0 pg    MCHC 33.2 31.5 - 36.5 g/dL    RDW 13.2 10.0 - 15.0 %    Platelet  Count 338 150 - 450 10e3/uL   Comprehensive metabolic panel (BMP + Alb, Alk Phos, ALT, AST, Total. Bili, TP)   Result Value Ref Range    Sodium 136 133 - 144 mmol/L    Potassium 3.9 3.4 - 5.3 mmol/L    Chloride 103 94 - 109 mmol/L    Carbon Dioxide (CO2) 26 20 - 32 mmol/L    Anion Gap 7 3 - 14 mmol/L    Urea Nitrogen 8 7 - 30 mg/dL    Creatinine 0.84 0.52 - 1.04 mg/dL    Calcium 9.6 8.5 - 10.1 mg/dL    Glucose 114 (H) 70 - 99 mg/dL    Alkaline Phosphatase 85 40 - 150 U/L     (H) 0 - 45 U/L     (H) 0 - 50 U/L    Protein Total 7.8 6.8 - 8.8 g/dL    Albumin 4.3 3.4 - 5.0 g/dL    Bilirubin Total 0.7 0.2 - 1.3 mg/dL    GFR Estimate >90 >60 mL/min/1.73m2                   .  ..  Answers for HPI/ROS submitted by the patient on 8/2/2022  If you checked off any problems, how difficult have these problems made it for you to do your work, take care of things at home, or get along with other people?: Somewhat difficult  PHQ9 TOTAL SCORE: 10

## 2022-08-04 DIAGNOSIS — G89.29 CHRONIC PAIN OF RIGHT KNEE: ICD-10-CM

## 2022-08-04 DIAGNOSIS — S83.001S PATELLAR SUBLUXATION, RIGHT, SEQUELA: ICD-10-CM

## 2022-08-04 DIAGNOSIS — M25.561 CHRONIC PAIN OF RIGHT KNEE: ICD-10-CM

## 2022-08-04 RX ORDER — CELECOXIB 100 MG/1
CAPSULE ORAL
Qty: 60 CAPSULE | Refills: 0 | Status: SHIPPED | OUTPATIENT
Start: 2022-08-04 | End: 2023-05-24

## 2022-08-04 NOTE — TELEPHONE ENCOUNTER
Prescription refill requested for:   celecoxib (CELEBREX) 100 MG capsule      Last Written Prescription Date:  6/21/22  Last Fill Quantity: 60,   # refills: 0  Last Office Visit: 7/5/22  Future Office visit:           Iman Zamora ATC

## 2022-08-09 NOTE — PROGRESS NOTES
[facility]    Outpatient Physical Therapy Discharge Note  Patient: Tana Hernandez  : 2000    Beginning/End Dates of Reporting Period:  22 to 22    Referring Provider: Dr. Justino Garcia.    Therapy Diagnosis: Low back pain. R knee pain.     Client Self Report: See eval.    Objective Measurements:  Objective Measure: Symptom location     Objective Measure: Symptom intensity.          Outcome Measures (most recent score):  Braulio STarT Sub-Score (Q5-9): 3  Braulio STarT Total Score (all 9): 6  Oswestry Score (%): 24 %    Goals:  Goal Identifier HEP   Goal Description Pt will be independent with HEP in order to improve lumbar motor control and LE strength   Target Date 22   Date Met      Progress (detail required for progress note):  Did not return to clinic.     Goal Identifier Work   Goal Description Pt will report no more than 2/10 pain in R knee while performing work duties in order to work as a . comfortably.   Target Date 22   Date Met      Progress (detail required for progress note):  Did not return to clinic.     Goal Identifier Symptom management   Goal Description Pt will demonstrate understanding of symptom management techniques such as position of relief and taping of knee and utilize them to keep symptoms no more than 2/10 in R knee or low back.   Target Date     Date Met      Progress (detail required for progress note):  Did not return to clinic.     Plan:  Discharge from therapy.    Discharge:    Reason for Discharge: Patient has not made expected progress due to interrupted treatment attendance.    Equipment Issued: None.    Discharge Plan: Patient to continue home program.

## 2022-09-10 ENCOUNTER — HEALTH MAINTENANCE LETTER (OUTPATIENT)
Age: 22
End: 2022-09-10

## 2022-09-21 ENCOUNTER — MYC MEDICAL ADVICE (OUTPATIENT)
Dept: FAMILY MEDICINE | Facility: CLINIC | Age: 22
End: 2022-09-21

## 2022-09-21 DIAGNOSIS — F90.9 ATTENTION DEFICIT HYPERACTIVITY DISORDER (ADHD), UNSPECIFIED ADHD TYPE: ICD-10-CM

## 2022-09-22 RX ORDER — DEXTROAMPHETAMINE SACCHARATE, AMPHETAMINE ASPARTATE MONOHYDRATE, DEXTROAMPHETAMINE SULFATE, AMPHETAMINE SULFATE 12.5; 12.5; 12.5; 12.5 MG/1; MG/1; MG/1; MG/1
50 CAPSULE, EXTENDED RELEASE ORAL DAILY PRN
Qty: 30 CAPSULE | Refills: 0 | Status: SHIPPED | OUTPATIENT
Start: 2022-09-22 | End: 2022-09-27

## 2022-09-27 ENCOUNTER — TELEPHONE (OUTPATIENT)
Dept: FAMILY MEDICINE | Facility: CLINIC | Age: 22
End: 2022-09-27

## 2022-09-27 DIAGNOSIS — F90.9 ATTENTION DEFICIT HYPERACTIVITY DISORDER (ADHD), UNSPECIFIED ADHD TYPE: ICD-10-CM

## 2022-09-27 RX ORDER — DEXTROAMPHETAMINE SACCHARATE, AMPHETAMINE ASPARTATE MONOHYDRATE, DEXTROAMPHETAMINE SULFATE, AMPHETAMINE SULFATE 12.5; 12.5; 12.5; 12.5 MG/1; MG/1; MG/1; MG/1
50 CAPSULE, EXTENDED RELEASE ORAL DAILY PRN
Qty: 30 CAPSULE | Refills: 0 | Status: SHIPPED | OUTPATIENT
Start: 2022-09-27 | End: 2022-11-22

## 2022-09-27 NOTE — TELEPHONE ENCOUNTER
Patient needs her yearly prior authorization renewed for her Amphet-Dextroamphet.     Routing to provider and PA Team    COLTON FranksN, RN

## 2022-09-28 ENCOUNTER — TELEPHONE (OUTPATIENT)
Dept: FAMILY MEDICINE | Facility: CLINIC | Age: 22
End: 2022-09-28

## 2022-09-28 NOTE — TELEPHONE ENCOUNTER
Central Prior Authorization Team   Phone: [phone]      PA Initiation    Medication: Amphet-Dextroamphet 3-Bead ER 50 MG CP24 - INITIATED  Insurance Company: [employer] - Phone [phone] Fax [phone]  Pharmacy Filling the Rx: [id number] - [place], [place] - [address]  Filling Pharmacy Phone: [phone]  Filling Pharmacy Fax:    Start Date: 9/28/2022

## 2022-09-28 NOTE — TELEPHONE ENCOUNTER
Central Prior Authorization Team   Phone: [phone]      Prior Authorization Approval    Authorization Effective Date: 8/29/2022  Authorization Expiration Date: 9/28/2023  Medication: Amphet-Dextroamphet 3-Bead ER 50 MG CP24 - PA APPROVED  Insurance Company: [employer] - Phone [phone] Fax [phone]  Which Pharmacy is filling the prescription (Not needed for infusion/clinic administered): [id number] - [place], [place] - [address] [place] [address] [address]  Pharmacy Notified: Yes  Patient Notified: Yes (pharmacy will notify patient when ready)

## 2022-09-28 NOTE — TELEPHONE ENCOUNTER
Prior Authorization Retail Medication Request    Medication/Dose: Amphet-Dextroamphet 3-Bead ER 50 MG CP24  ICD code (if different than what is on RX): Attention deficit hyperactivity disorder (ADHD), unspecified ADHD type [F90.9]   Previously Tried and Failed:    Rationale:      Insurance Name:  [employer]  Insurance ID:  [id number]      Pharmacy Information (if different than what is on RX)  Name:  [facility]  Phone:  [phone]

## 2022-10-25 DIAGNOSIS — E11.65 TYPE 2 DIABETES MELLITUS WITH HYPERGLYCEMIA, WITHOUT LONG-TERM CURRENT USE OF INSULIN (H): ICD-10-CM

## 2022-10-25 DIAGNOSIS — F39 MOOD DISORDER (H): ICD-10-CM

## 2022-10-26 RX ORDER — FLUOXETINE 40 MG/1
CAPSULE ORAL
Qty: 90 CAPSULE | Refills: 0 | OUTPATIENT
Start: 2022-10-26

## 2022-10-26 RX ORDER — METFORMIN HCL 500 MG
TABLET, EXTENDED RELEASE 24 HR ORAL
Qty: 180 TABLET | Refills: 2 | Status: SHIPPED | OUTPATIENT
Start: 2022-10-26 | End: 2023-10-23

## 2022-10-26 NOTE — TELEPHONE ENCOUNTER
Metformin  Prescription approved per [facility] Refill Protocol.    prozac  Will forward to prescribing provider    Evon Leal RN

## 2022-10-26 NOTE — TELEPHONE ENCOUNTER
Pt's care returned to pcp in June 2022 with message CC'd on last note. Will route back to primary care provider for refill. Thanks!

## 2022-11-22 ENCOUNTER — VIRTUAL VISIT (OUTPATIENT)
Dept: FAMILY MEDICINE | Facility: CLINIC | Age: 22
End: 2022-11-22
Payer: COMMERCIAL

## 2022-11-22 DIAGNOSIS — Z11.3 SCREEN FOR STD (SEXUALLY TRANSMITTED DISEASE): ICD-10-CM

## 2022-11-22 DIAGNOSIS — F60.3 BORDERLINE PERSONALITY DISORDER (H): ICD-10-CM

## 2022-11-22 DIAGNOSIS — F90.9 ATTENTION DEFICIT HYPERACTIVITY DISORDER (ADHD), UNSPECIFIED ADHD TYPE: ICD-10-CM

## 2022-11-22 DIAGNOSIS — E11.65 TYPE 2 DIABETES MELLITUS WITH HYPERGLYCEMIA, WITHOUT LONG-TERM CURRENT USE OF INSULIN (H): Primary | ICD-10-CM

## 2022-11-22 DIAGNOSIS — F39 MOOD DISORDER (H): ICD-10-CM

## 2022-11-22 PROCEDURE — 99214 OFFICE O/P EST MOD 30 MIN: CPT | Mod: TEL | Performed by: NURSE PRACTITIONER

## 2022-11-22 PROCEDURE — 96127 BRIEF EMOTIONAL/BEHAV ASSMT: CPT | Mod: TEL | Performed by: NURSE PRACTITIONER

## 2022-11-22 RX ORDER — LAMOTRIGINE 100 MG/1
100 TABLET ORAL DAILY
Qty: 90 TABLET | Refills: 0 | Status: SHIPPED | OUTPATIENT
Start: 2022-11-22 | End: 2023-04-06

## 2022-11-22 RX ORDER — DEXTROAMPHETAMINE SACCHARATE, AMPHETAMINE ASPARTATE MONOHYDRATE, DEXTROAMPHETAMINE SULFATE, AMPHETAMINE SULFATE 12.5; 12.5; 12.5; 12.5 MG/1; MG/1; MG/1; MG/1
50 CAPSULE, EXTENDED RELEASE ORAL DAILY PRN
Qty: 30 CAPSULE | Refills: 0 | Status: SHIPPED | OUTPATIENT
Start: 2022-11-22 | End: 2022-12-29

## 2022-11-22 RX ORDER — FLUOXETINE 40 MG/1
40 CAPSULE ORAL DAILY
Qty: 90 CAPSULE | Refills: 0 | Status: SHIPPED | OUTPATIENT
Start: 2022-11-22 | End: 2023-03-07

## 2022-11-22 ASSESSMENT — ANXIETY QUESTIONNAIRES
6. BECOMING EASILY ANNOYED OR IRRITABLE: SEVERAL DAYS
5. BEING SO RESTLESS THAT IT IS HARD TO SIT STILL: SEVERAL DAYS
IF YOU CHECKED OFF ANY PROBLEMS ON THIS QUESTIONNAIRE, HOW DIFFICULT HAVE THESE PROBLEMS MADE IT FOR YOU TO DO YOUR WORK, TAKE CARE OF THINGS AT HOME, OR GET ALONG WITH OTHER PEOPLE: SOMEWHAT DIFFICULT
GAD7 TOTAL SCORE: 7
GAD7 TOTAL SCORE: 7
8. IF YOU CHECKED OFF ANY PROBLEMS, HOW DIFFICULT HAVE THESE MADE IT FOR YOU TO DO YOUR WORK, TAKE CARE OF THINGS AT HOME, OR GET ALONG WITH OTHER PEOPLE?: SOMEWHAT DIFFICULT
3. WORRYING TOO MUCH ABOUT DIFFERENT THINGS: SEVERAL DAYS
1. FEELING NERVOUS, ANXIOUS, OR ON EDGE: SEVERAL DAYS
7. FEELING AFRAID AS IF SOMETHING AWFUL MIGHT HAPPEN: SEVERAL DAYS
7. FEELING AFRAID AS IF SOMETHING AWFUL MIGHT HAPPEN: SEVERAL DAYS
2. NOT BEING ABLE TO STOP OR CONTROL WORRYING: SEVERAL DAYS
4. TROUBLE RELAXING: SEVERAL DAYS
GAD7 TOTAL SCORE: 7

## 2022-11-22 ASSESSMENT — PATIENT HEALTH QUESTIONNAIRE - PHQ9
SUM OF ALL RESPONSES TO PHQ QUESTIONS 1-9: 9
10. IF YOU CHECKED OFF ANY PROBLEMS, HOW DIFFICULT HAVE THESE PROBLEMS MADE IT FOR YOU TO DO YOUR WORK, TAKE CARE OF THINGS AT HOME, OR GET ALONG WITH OTHER PEOPLE: SOMEWHAT DIFFICULT
SUM OF ALL RESPONSES TO PHQ QUESTIONS 1-9: 9

## 2022-11-22 NOTE — PROGRESS NOTES
Piedad is a 22 year old who is being evaluated via a billable video visit.      How would you like to obtain your AVS? MyChart  If the video visit is dropped, the invitation should be resent by: Text to cell phone: [phone]  Will anyone else be joining your video visit? No          Assessment & Plan     Borderline personality disorder (H)  Was followed by Psych- Nettie Franco- now stable and I can take over medication.  Denies suicidal plans.    - lamoTRIgine (LAMICTAL) 100 MG tablet; Take 1 tablet (100 mg) by mouth daily    Mood disorder (H)  As above  - lamoTRIgine (LAMICTAL) 100 MG tablet; Take 1 tablet (100 mg) by mouth daily  - FLUoxetine (PROZAC) 40 MG capsule; Take 1 capsule (40 mg) by mouth daily    Type 2 diabetes mellitus with hyperglycemia, without long-term current use of insulin (H)  Last check with good control- has been losing weight.  On metformin.  Check future labs  - Lipid panel reflex to direct LDL Fasting; Future  - Hemoglobin A1c; Future  - Basic metabolic panel  (Ca, Cl, CO2, Creat, Gluc, K, Na, BUN); Future    Attention deficit hyperactivity disorder (ADHD), unspecified ADHD type  Stable .   - Amphet-Dextroamphet 3-Bead ER 50 MG CP24; Take 50 mg by mouth daily as needed (ADHD)    Screen for STD (sexually transmitted disease)  Screen- asymptomatic  - NEISSERIA GONORRHOEA PCR; Future  - CHLAMYDIA TRACHOMATIS PCR; Future  - HIV Antigen Antibody Combo; Future  - Hepatitis C antibody; Future  - Treponema Abs w Reflex to RPR and Titer; Future  - Hepatitis B surface antigen; Future  - Hepatitis B Surface Antibody; Future    28 minutes spent on the date of the encounter doing chart review, review of test results, interpretation of tests, patient visit and documentation        Depression Screening Follow Up    PHQ 11/22/2022   PHQ-9 Total Score 9   Q9: Thoughts of better off dead/self-harm past 2 weeks Several days   F/U: Thoughts of suicide or self-harm No   F/U: Self harm-plan -   F/U: Self-harm  action -   F/U: Safety concerns No     Last PHQ-9 11/22/2022   1.  Little interest or pleasure in doing things 1   2.  Feeling down, depressed, or hopeless 1   3.  Trouble falling or staying asleep, or sleeping too much 1   4.  Feeling tired or having little energy 1   5.  Poor appetite or overeating 1   6.  Feeling bad about yourself 1   7.  Trouble concentrating 1   8.  Moving slowly or restless 1   Q9: Thoughts of better off dead/self-harm past 2 weeks 1   PHQ-9 Total Score 9   Difficulty at work, home, or with people -   In the past two weeks have you had thoughts of suicide or self harm? No   Do you have concerns about your personal safety or the safety of others? No   In the past 2 weeks have you thought about a plan or had intention to harm yourself? -   In the past 2 weeks have you acted on these thoughts in any way? -         Return in about 6 months (around 5/22/2023) for Physical Exam.    Gina Ferraro NP  [facility] OBDULIO Herbert is a 22 year oldpresenting for the following health issues:  Follow Up (Heart monitoring)      History of Present Illness       Mental Health Follow-up:  Patient presents to follow-up on Depression & Anxiety.Patient's depression since last visit has been:  Better  The patient is not having other symptoms associated with depression.  Patient's anxiety since last visit has been:  Better  The patient is not having other symptoms associated with anxiety.  Any significant life events: relationship concerns, job concerns, financial concerns and health concerns  Patient is not feeling anxious or having panic attacks.  Patient has no concerns about alcohol or drug use.    Diabetes:   She presents for follow up of diabetes.  She is not checking blood glucose. She has no concerns regarding her diabetes at this time.  She is having excessive thirst and weight loss. The patient has not had a diabetic eye exam in the last 12 months.         Reason for  visit:  Multiple things, old er follow up, med refills,    She eats 2-3 servings of fruits and vegetables daily.She consumes 0 sweetened beverage(s) daily.She exercises with enough effort to increase her heart rate 9 or less minutes per day.  She exercises with enough effort to increase her heart rate 3 or less days per week. She is missing 3 dose(s) of medications per week.  She is not taking prescribed medications regularly due to remembering to take and other.    Today's PHQ-9         PHQ-9 Total Score: 9    PHQ-9 Q9 Thoughts of better off dead/self-harm past 2 weeks :   Several days  Thoughts of suicide or self harm: (P) No  Self-harm Plan:     Self-harm Action:       Safety concerns for self or others: (P) No    How difficult have these problems made it for you to do your work, take care of things at home, or get along with other people: Somewhat difficult  Today's ABDIRAHMAN-7 Score: 7     Started DBT with counseling, PHQ-9 is 9- 1- has suicidal ideation- no plan.     Feels weight is down 10lbs.      Headaches- allergy related- push on forehead- hurts so bad.  Taking nasal decongest when happens         Review of Systems   Constitutional, HEENT, cardiovascular, pulmonary, GI, , musculoskeletal, neuro, skin, endocrine and psych systems are negative, except as otherwise noted.      Objective           Vitals:  No vitals were obtained today due to virtual visit.    Physical Exam   GENERAL: Healthy, alert and no distress

## 2023-01-22 ENCOUNTER — HEALTH MAINTENANCE LETTER (OUTPATIENT)
Age: 23
End: 2023-01-22

## 2023-01-23 DIAGNOSIS — K21.9 GASTROESOPHAGEAL REFLUX DISEASE WITHOUT ESOPHAGITIS: ICD-10-CM

## 2023-01-25 ENCOUNTER — MYC REFILL (OUTPATIENT)
Dept: FAMILY MEDICINE | Facility: CLINIC | Age: 23
End: 2023-01-25
Payer: COMMERCIAL

## 2023-01-25 DIAGNOSIS — F90.9 ATTENTION DEFICIT HYPERACTIVITY DISORDER (ADHD), UNSPECIFIED ADHD TYPE: ICD-10-CM

## 2023-01-25 RX ORDER — DEXTROAMPHETAMINE SACCHARATE, AMPHETAMINE ASPARTATE MONOHYDRATE, DEXTROAMPHETAMINE SULFATE, AMPHETAMINE SULFATE 12.5; 12.5; 12.5; 12.5 MG/1; MG/1; MG/1; MG/1
50 CAPSULE, EXTENDED RELEASE ORAL DAILY PRN
Qty: 30 CAPSULE | Refills: 0 | Status: CANCELLED | OUTPATIENT
Start: 2023-01-25

## 2023-01-25 NOTE — TELEPHONE ENCOUNTER
"Requested Prescriptions   Pending Prescriptions Disp Refills    famotidine (PEPCID) 40 MG tablet [Pharmacy Med Name: FAMOTIDINE 40MG TABLETS] 90 tablet 1     Sig: TAKE 1 TABLET(40 MG) BY MOUTH DAILY       H2 Blockers Protocol Failed - 1/23/2023  3:43 AM        Failed - Medication is active on med list        Passed - Patient is age 12 or older        Passed - Recent (12 mo) or future (30 days) visit within the authorizing provider's specialty     Patient has had an office visit with the authorizing provider or a provider within the authorizing providers department within the previous 12 mos or has a future within next 30 days. See \"Patient Info\" tab in inbasket, or \"Choose Columns\" in Meds & Orders section of the refill encounter.                   "

## 2023-01-26 RX ORDER — FAMOTIDINE 40 MG/1
TABLET, FILM COATED ORAL
Qty: 90 TABLET | Refills: 1 | Status: SHIPPED | OUTPATIENT
Start: 2023-01-26 | End: 2023-08-04

## 2023-01-26 NOTE — TELEPHONE ENCOUNTER
Requested Prescriptions   Pending Prescriptions Disp Refills     Amphet-Dextroamphet 3-Bead ER 50 MG CP24 30 capsule 0     Sig: Take 50 mg by mouth daily as needed (ADHD)         Routing refill request to provider for review/approval because:  Drug not on the [facility] refill protocol or controlled substance

## 2023-01-26 NOTE — TELEPHONE ENCOUNTER
Requested Prescriptions   Pending Prescriptions Disp Refills    Amphet-Dextroamphet 3-Bead ER 50 MG CP24 30 capsule 0     Sig: Take 50 mg by mouth daily as needed (ADHD)       There is no refill protocol information for this order

## 2023-01-27 ENCOUNTER — MYC REFILL (OUTPATIENT)
Dept: FAMILY MEDICINE | Facility: CLINIC | Age: 23
End: 2023-01-27
Payer: COMMERCIAL

## 2023-01-27 DIAGNOSIS — F90.9 ATTENTION DEFICIT HYPERACTIVITY DISORDER (ADHD), UNSPECIFIED ADHD TYPE: ICD-10-CM

## 2023-01-27 RX ORDER — DEXTROAMPHETAMINE SACCHARATE, AMPHETAMINE ASPARTATE MONOHYDRATE, DEXTROAMPHETAMINE SULFATE, AMPHETAMINE SULFATE 12.5; 12.5; 12.5; 12.5 MG/1; MG/1; MG/1; MG/1
50 CAPSULE, EXTENDED RELEASE ORAL DAILY PRN
Qty: 30 CAPSULE | Refills: 0 | Status: CANCELLED | OUTPATIENT
Start: 2023-01-27

## 2023-01-27 NOTE — TELEPHONE ENCOUNTER
Requested Prescriptions   Pending Prescriptions Disp Refills     Amphet-Dextroamphet 3-Bead ER 50 MG CP24 30 capsule 0     Sig: Take 50 mg by mouth daily as needed (ADHD)       Routing refill request to provider for review/approval because:  Drug not on the [facility], [facility], [facility] or [facility] refill protocol or controlled substance      This is a duplicate request see encounter open from 1/25/2023. Patient informed. Closing encounter.   Dee Puentes MA

## 2023-01-29 RX ORDER — DEXTROAMPHETAMINE SACCHARATE, AMPHETAMINE ASPARTATE MONOHYDRATE, DEXTROAMPHETAMINE SULFATE, AMPHETAMINE SULFATE 12.5; 12.5; 12.5; 12.5 MG/1; MG/1; MG/1; MG/1
50 CAPSULE, EXTENDED RELEASE ORAL DAILY PRN
Qty: 30 CAPSULE | Refills: 0 | Status: SHIPPED | OUTPATIENT
Start: 2023-01-29 | End: 2023-03-03

## 2023-02-12 ENCOUNTER — NURSE TRIAGE (OUTPATIENT)
Dept: NURSING | Facility: CLINIC | Age: 23
End: 2023-02-12
Payer: COMMERCIAL

## 2023-02-12 NOTE — TELEPHONE ENCOUNTER
Nurse Triage SBAR    Situation:   - request    Background:   -Patient calling.    Assessment:   -4w5d pregnant  -back pain (this is chronic, but thinks the pregnancy is making worse, she declined triage for this)  -no vaginal discharge or bleeding  -no other concerning symptoms  -[facility], but is wondering if she would have faster options though [place]    Recommendation:   Care team: Please call patient back at [phone] and advise. It is okay to leave a detailed message at this number.     ALFONSO MONTEJO RN on 2023 at 1:01 PM      Reason for Disposition    [1] Caller requesting NON-URGENT health information AND [2] PCP's office is the best resource    Protocols used: INFORMATION ONLY CALL - NO TRIAGE-A-

## 2023-02-13 NOTE — TELEPHONE ENCOUNTER
[facility] does not do abortions.  These are the two I know if.  Gina Ferraro, ELVIE   Name & Phone Address Services   [facility]  [phone]  [address], [place] [address]  Pregnancy testing, STI testing, contraception, medical & surgical abortions, abortions up to 24 weeks   [facility]  [phone] [address], [place] [address] Pregnancy testing, STI testing, contraception, PAP, Colpo, & LEEP, vasectomies, medical & surgical abortions, abortions up to 24 weeks

## 2023-02-13 NOTE — TELEPHONE ENCOUNTER
RN has attempted to contact this patient by phone to return their call, but there is no response and unable to leave a VM because mailbox is full.  Will try again later.     COLTON NguyenN, RN

## 2023-02-14 NOTE — TELEPHONE ENCOUNTER
RN has attempted to contact this patient by phone to return their call again, but there is no response and unable to leave a VM because mailbox is full.  RN sent a MyChart to patient as well. Closing this encounter.      COLTON NguyenN, RN

## 2023-02-28 ENCOUNTER — OFFICE VISIT (OUTPATIENT)
Dept: FAMILY MEDICINE | Facility: CLINIC | Age: 23
End: 2023-02-28
Payer: COMMERCIAL

## 2023-02-28 VITALS
SYSTOLIC BLOOD PRESSURE: 139 MMHG | TEMPERATURE: 97.6 F | HEART RATE: 100 BPM | DIASTOLIC BLOOD PRESSURE: 87 MMHG | BODY MASS INDEX: 44.1 KG/M2 | RESPIRATION RATE: 16 BRPM | OXYGEN SATURATION: 100 % | WEIGHT: 273.2 LBS

## 2023-02-28 DIAGNOSIS — Z23 NEED FOR IMMUNIZATION AGAINST INFLUENZA: ICD-10-CM

## 2023-02-28 DIAGNOSIS — Z23 NEED FOR PNEUMOCOCCAL VACCINATION: ICD-10-CM

## 2023-02-28 DIAGNOSIS — E11.9 TYPE 2 DIABETES MELLITUS WITHOUT COMPLICATION, WITHOUT LONG-TERM CURRENT USE OF INSULIN (H): ICD-10-CM

## 2023-02-28 DIAGNOSIS — H66.003 ACUTE SUPPURATIVE OTITIS MEDIA OF BOTH EARS WITHOUT SPONTANEOUS RUPTURE OF TYMPANIC MEMBRANES, RECURRENCE NOT SPECIFIED: Primary | ICD-10-CM

## 2023-02-28 PROCEDURE — 90686 IIV4 VACC NO PRSV 0.5 ML IM: CPT | Performed by: NURSE PRACTITIONER

## 2023-02-28 PROCEDURE — 90677 PCV20 VACCINE IM: CPT | Performed by: NURSE PRACTITIONER

## 2023-02-28 PROCEDURE — 90471 IMMUNIZATION ADMIN: CPT | Performed by: NURSE PRACTITIONER

## 2023-02-28 PROCEDURE — 90472 IMMUNIZATION ADMIN EACH ADD: CPT | Performed by: NURSE PRACTITIONER

## 2023-02-28 PROCEDURE — 99213 OFFICE O/P EST LOW 20 MIN: CPT | Mod: 25 | Performed by: NURSE PRACTITIONER

## 2023-02-28 RX ORDER — AMOXICILLIN 875 MG
875 TABLET ORAL 2 TIMES DAILY
Qty: 20 TABLET | Refills: 0 | Status: SHIPPED | OUTPATIENT
Start: 2023-02-28 | End: 2023-03-10

## 2023-02-28 ASSESSMENT — PATIENT HEALTH QUESTIONNAIRE - PHQ9
10. IF YOU CHECKED OFF ANY PROBLEMS, HOW DIFFICULT HAVE THESE PROBLEMS MADE IT FOR YOU TO DO YOUR WORK, TAKE CARE OF THINGS AT HOME, OR GET ALONG WITH OTHER PEOPLE: SOMEWHAT DIFFICULT
SUM OF ALL RESPONSES TO PHQ QUESTIONS 1-9: 9
SUM OF ALL RESPONSES TO PHQ QUESTIONS 1-9: 9

## 2023-02-28 ASSESSMENT — PAIN SCALES - GENERAL: PAINLEVEL: NO PAIN (1)

## 2023-02-28 NOTE — PATIENT INSTRUCTIONS
At [facility], we strive to deliver an exceptional experience to you, every time we see you. If you receive a survey, please complete it as we do value your feedback.  If you have MyChart, you can expect to receive results automatically within 24 hours of their completion.  Your provider will send a note interpreting your results as well.   If you do not have MyChart, you should receive your results in about a week by mail.    Your care team:                            Family Medicine Internal Medicine   MD Vinicio Vazquez MD Shantel Branch-Fleming, MD Srinivasa Vaka, MD Katya Belousova, PAJAMA Castillo CNP, MD ([facility]   Dutch Diaz, MD Monica Vargas MD Amelia Massimini APRN ELVIE Kapoor APRN MD Alfreda Mendieta MD          Clinic hours: Monday - Thursday 7 am-6 pm; Fridays 7 am-5 pm.   Urgent care: Monday - Friday 10 am- 8 pm; Saturday and Sunday 9 am-5 pm.    Clinic: [phone]       [facility]: Monday - Thursday 8 am - 7 pm; Friday 8 am - 6 pm  [facility]: [phone]

## 2023-02-28 NOTE — PROGRESS NOTES
"  Assessment & Plan     Acute suppurative otitis media of both ears without spontaneous rupture of tympanic membranes, recurrence not specified  Treating with Amoxil, follow back 1 month for re check and AWE  - amoxicillin (AMOXIL) 875 MG tablet  Dispense: 20 tablet; Refill: 0    Type 2 diabetes mellitus without complication, without long-term current use of insulin (H)  Due for ey exam, will have labs drawn prior to her AWE- she'll schedule appt .  - Adult Eye  Referral    Need for immunization against influenza    - INFLUENZA VACCINE IM > 6 MONTHS VALENT IIV4 (AFLURIA/FLUZONE)    Need for pneumococcal vaccination    - Pneumococcal 20 Valent Conjugate (Prevnar 20)      Ordering of each unique test  Prescription drug management  17 minutes spent on the date of the encounter doing chart review, history and exam, documentation and further activities per the note       BMI:   Estimated body mass index is 44.1 kg/m  as calculated from the following:    Height as of 5/10/22: 1.676 m (5' 6\").    Weight as of this encounter: 123.9 kg (273 lb 3.2 oz).   Weight management plan: Discussed healthy diet and exercise guidelines    Depression Screening Follow Up    PHQ 2/28/2023   PHQ-9 Total Score 9   Q9: Thoughts of better off dead/self-harm past 2 weeks Several days   F/U: Thoughts of suicide or self-harm No   F/U: Self harm-plan -   F/U: Self-harm action -   F/U: Safety concerns No     Followed by Psychiatry and currently doing DBT, patient feels her symptoms are stable.        Follow Up    Follow Up Actions Taken  Referred patient back to mental health provider    Discussed the following ways the patient can remain in a safe environment:  be around others  Work on weight loss  Regular exercise  See Patient Instructions    Return in about 4 weeks (around 3/28/2023), or if symptoms worsen or fail to improve, for Routine preventive.    Sary Kapoor, JAMA [facility]    Subjective "   Piedad is a 22 year old{ presenting for the following health issues:  Otalgia (Bilateral )      History of Present Illness       Reason for visit:  Ear infection  Symptom onset:  3-4 weeks ago  Symptoms include:  Ear drainage into throat fullness in ear canal  Symptom intensity:  Mild  Symptom progression:  Worsening  Had these symptoms before:  Yes  Has tried/received treatment for these symptoms:  Yes  Previous treatment was successful:  No  What makes it worse:  Allergies  What makes it better:  Heat and moving head in certain positions    She eats 2-3 servings of fruits and vegetables daily.She consumes 0 sweetened beverage(s) daily.She exercises with enough effort to increase her heart rate 20 to 29 minutes per day.  She exercises with enough effort to increase her heart rate 3 or less days per week. She is missing 1 dose(s) of medications per week.    Today's PHQ-9         PHQ-9 Total Score: 9    PHQ-9 Q9 Thoughts of better off dead/self-harm past 2 weeks :   Several days  Thoughts of suicide or self harm: (P) No  Self-harm Plan:     Self-harm Action:       Safety concerns for self or others: (P) No    How difficult have these problems made it for you to do your work, take care of things at home, or get along with other people: Somewhat difficult       Diabetes Follow-up      How often are you checking your blood sugar? Not at all    What concerns do you have today about your diabetes? None and Getting infections often     Do you have any of these symptoms? (Select all that apply)  No numbness or tingling in feet.  No redness, sores or blisters on feet.  No complaints of excessive thirst.  No reports of blurry vision.  No significant changes to weight.    Have you had a diabetic eye exam in the last 12 months? No    Diet controlled.    BP Readings from Last 2 Encounters:   02/28/23 139/87   08/02/22 132/88     Hemoglobin A1C (%)   Date Value   06/28/2022 6.5 (H)   09/13/2021 7.8 (H)     LDL Cholesterol  Calculated (mg/dL)   Date Value   09/13/2021 96   04/20/2017 80     Patient declines pap today    Review of Systems   Constitutional, HEENT, cardiovascular, pulmonary, gi and gu systems are negative, except as otherwise noted.      Objective    /87   Pulse 100   Temp 97.6  F (36.4  C) (Oral)   Resp 16   Wt 123.9 kg (273 lb 3.2 oz)   LMP 01/10/2023   SpO2 100%   BMI 44.10 kg/m    Body mass index is 44.1 kg/m .  Physical Exam   GENERAL: healthy, alert and no distress  EYES: Eyes grossly normal to inspection, PERRL and conjunctivae and sclerae normal  HENT: normal cephalic/atraumatic, both ears: erythematous and mucopurulent effusion, nose and mouth without ulcers or lesions, oropharynx clear and oral mucous membranes moist  NECK: no adenopathy, no asymmetry, masses, or scars and thyroid normal to palpation  RESP: lungs clear to auscultation - no rales, rhonchi or wheezes  CV: regular rate and rhythm, normal S1 S2, no S3 or S4, no murmur, click or rub, no peripheral edema and peripheral pulses strong  ABDOMEN: soft, nontender, no hepatosplenomegaly, no masses and bowel sounds normal  MS: no gross musculoskeletal defects noted, no edema

## 2023-02-28 NOTE — NURSING NOTE
Prior to immunization administration, verified patients identity using patient s name and date of birth. Please see Immunization Activity for additional information.     Screening Questionnaire for Adult Immunization    Are you sick today?   No   Do you have allergies to medications, food, a vaccine component or latex?   Yes- wellbutrin   Have you ever had a serious reaction after receiving a vaccination?   No   Do you have a long-term health problem with heart, lung, kidney, or metabolic disease (e.g., diabetes), asthma, a blood disorder, no spleen, complement component deficiency, a cochlear implant, or a spinal fluid leak?  Are you on long-term aspirin therapy?   Yes- diabetes   Do you have cancer, leukemia, HIV/AIDS, or any other immune system problem?   No   Do you have a parent, brother, or sister with an immune system problem?   No   In the past 3 months, have you taken medications that affect  your immune system, such as prednisone, other steroids, or anticancer drugs; drugs for the treatment of rheumatoid arthritis, Crohn s disease, or psoriasis; or have you had radiation treatments?   No   Have you had a seizure, or a brain or other nervous system problem?   No   During the past year, have you received a transfusion of blood or blood    products, or been given immune (gamma) globulin or antiviral drug?   No   For women: Are you pregnant or is there a chance you could become       pregnant during the next month?   No   Have you received any vaccinations in the past 4 weeks?   No     Immunization questionnaire was positive for at least one answer.  Notified  Thein.        Per orders of Thein, injection of PCV20 and FLuzone given by Anali Sanabria RN. Patient instructed to remain in clinic for 15 minutes afterwards, and to report any adverse reaction to me immediately.       Screening performed by Anali Sanabria RN on 2/28/2023 at 10:10 AM.

## 2023-03-03 ENCOUNTER — MYC REFILL (OUTPATIENT)
Dept: FAMILY MEDICINE | Facility: CLINIC | Age: 23
End: 2023-03-03
Payer: COMMERCIAL

## 2023-03-03 DIAGNOSIS — F90.9 ATTENTION DEFICIT HYPERACTIVITY DISORDER (ADHD), UNSPECIFIED ADHD TYPE: ICD-10-CM

## 2023-03-03 RX ORDER — DEXTROAMPHETAMINE SACCHARATE, AMPHETAMINE ASPARTATE MONOHYDRATE, DEXTROAMPHETAMINE SULFATE, AMPHETAMINE SULFATE 12.5; 12.5; 12.5; 12.5 MG/1; MG/1; MG/1; MG/1
50 CAPSULE, EXTENDED RELEASE ORAL DAILY PRN
Qty: 30 CAPSULE | Refills: 0 | Status: SHIPPED | OUTPATIENT
Start: 2023-03-03 | End: 2023-04-05

## 2023-03-04 DIAGNOSIS — F39 MOOD DISORDER (H): ICD-10-CM

## 2023-03-07 RX ORDER — FLUOXETINE 40 MG/1
CAPSULE ORAL
Qty: 90 CAPSULE | Refills: 1 | Status: SHIPPED | OUTPATIENT
Start: 2023-03-07 | End: 2023-09-08

## 2023-03-07 NOTE — TELEPHONE ENCOUNTER
Pending Prescriptions:                       Disp   Refills    FLUoxetine (PROZAC) 40 MG capsule [Pharmac*90 cap*1        Sig: TAKE 1 CAPSULE(40 MG) BY MOUTH DAILY    Routing refill request to provider for review/approval because:  Drug interaction warning

## 2023-03-09 ENCOUNTER — TELEPHONE (OUTPATIENT)
Dept: FAMILY MEDICINE | Facility: CLINIC | Age: 23
End: 2023-03-09

## 2023-03-09 NOTE — TELEPHONE ENCOUNTER
Prior Authorization Retail Medication Request    Medication/Dose: Amphet-Dextroamphet 3-Bead ER 50 MG CP24  ICD code (if different than what is on RX):  Attention deficit hyperactivity disorder (ADHD), unspecified ADHD type [F90.9]   Previously Tried and Failed:  na  Rationale:  na    Insurance Name:  [employer]  Insurance ID:  [id number]      Pharmacy Information (if different than what is on RX)  Name:  [facility]  Phone:  [phone]

## 2023-03-20 DIAGNOSIS — E11.65 TYPE 2 DIABETES MELLITUS WITH HYPERGLYCEMIA, WITHOUT LONG-TERM CURRENT USE OF INSULIN (H): ICD-10-CM

## 2023-03-21 RX ORDER — METFORMIN HCL 500 MG
TABLET, EXTENDED RELEASE 24 HR ORAL
Qty: 180 TABLET | Refills: 2 | OUTPATIENT
Start: 2023-03-21

## 2023-03-31 ENCOUNTER — OFFICE VISIT (OUTPATIENT)
Dept: FAMILY MEDICINE | Facility: CLINIC | Age: 23
End: 2023-03-31
Payer: COMMERCIAL

## 2023-03-31 VITALS
DIASTOLIC BLOOD PRESSURE: 98 MMHG | SYSTOLIC BLOOD PRESSURE: 142 MMHG | HEIGHT: 65 IN | OXYGEN SATURATION: 98 % | TEMPERATURE: 97.7 F | WEIGHT: 270 LBS | BODY MASS INDEX: 44.98 KG/M2 | RESPIRATION RATE: 18 BRPM | HEART RATE: 88 BPM

## 2023-03-31 DIAGNOSIS — H66.91 RIGHT OTITIS MEDIA, UNSPECIFIED OTITIS MEDIA TYPE: Primary | ICD-10-CM

## 2023-03-31 PROCEDURE — 99213 OFFICE O/P EST LOW 20 MIN: CPT | Performed by: PHYSICIAN ASSISTANT

## 2023-03-31 RX ORDER — CEFDINIR 300 MG/1
600 CAPSULE ORAL DAILY
Qty: 14 CAPSULE | Refills: 0 | Status: SHIPPED | OUTPATIENT
Start: 2023-03-31 | End: 2023-05-24

## 2023-03-31 ASSESSMENT — PATIENT HEALTH QUESTIONNAIRE - PHQ9
SUM OF ALL RESPONSES TO PHQ QUESTIONS 1-9: 9
SUM OF ALL RESPONSES TO PHQ QUESTIONS 1-9: 9
10. IF YOU CHECKED OFF ANY PROBLEMS, HOW DIFFICULT HAVE THESE PROBLEMS MADE IT FOR YOU TO DO YOUR WORK, TAKE CARE OF THINGS AT HOME, OR GET ALONG WITH OTHER PEOPLE: SOMEWHAT DIFFICULT

## 2023-03-31 ASSESSMENT — PAIN SCALES - GENERAL: PAINLEVEL: MILD PAIN (3)

## 2023-03-31 NOTE — PROGRESS NOTES
Assessment & Plan     (H66.91) Right otitis media, unspecified otitis media type  (primary encounter diagnosis)  Comment: Patient with ongoing right ear pain.  Consider pressure versus diagnosis including mastoiditis, otitis media, eustachian tube dysfunction, amongst others.  Patient has not had any fevers.  On examination she does have fluid behind the right panic membrane.  Discussed with her trial of cefdinir as well as follow-up with ENT.  She has been using Afrin intermittent.  Advised using Afrin for 3 days twice a day as well as Flonase for the next month.  Suspicious this may be more of eustachian tube dysfunction.  Given the longevity of symptoms no indications to repeat testing for COVID or influenza.  patient will return with any worsening or new concerns.  Plan: cefdinir (OMNICEF) 300 MG capsule, Adult ENT          Referral            Depression Screening Follow Up    PHQ 3/31/2023   PHQ-9 Total Score 9   Q9: Thoughts of better off dead/self-harm past 2 weeks Several days   F/U: Thoughts of suicide or self-harm No   F/U: Self harm-plan -   F/U: Self-harm action -   F/U: Safety concerns No               Patient will continue to follow with therapist and psychiatry  Discussed if there is any worsening or thoughts of self-harm she will seek emergency department evaluation    Francisco He PA-C  [facility]   Piedad is a 22 year old, presenting for the following health issues:  Otalgia (Pain in both ears. )  No flowsheet data found.  History of Present Illness       Reason for visit:  Ear infection    She eats 2-3 servings of fruits and vegetables daily.She consumes 0 sweetened beverage(s) daily.She exercises with enough effort to increase her heart rate 10 to 19 minutes per day.  She exercises with enough effort to increase her heart rate 3 or less days per week. She is missing 7 dose(s) of medications per week.    Today's PHQ-9         PHQ-9 Total Score:  "9    PHQ-9 Q9 Thoughts of better off dead/self-harm past 2 weeks :   Several days  Thoughts of suicide or self harm: (P) No  Self-harm Plan:     Self-harm Action:       Safety concerns for self or others: (P) No    How difficult have these problems made it for you to do your work, take care of things at home, or get along with other people: Somewhat difficult     Patient did follow with her therapist today.  She is doing DBT.  Overall feels as though her mental health is close to baseline.  She denies any suicidal ideation or thoughts of self-harm.    December 2022 patient was ill with COVID.  Developed sinus congestion and ear pain at that time.  Was evaluated in January for right ear pain.  Was prescribed Augmentin, however, did not finish the course that she became too nauseated and ill with this.  Last month was prescribed amoxicillin for right otitis.  Despite this continues to have ear pressure with crackling in the ear.  Sinus pressure is since improved.  Patient has not had any fevers, cough, nausea, vomiting.      Review of Systems   Constitutional, HEENT, cardiovascular, pulmonary, gi and gu systems are negative, except as otherwise noted.      Objective    BP (!) 142/98   Pulse 88   Temp 97.7  F (36.5  C) (Tympanic)   Resp 18   Ht 1.651 m (5' 5\")   Wt 77.5 kg (170 lb 12.8 oz)   LMP 01/10/2023   SpO2 98%   BMI 28.42 kg/m    Body mass index is 28.42 kg/m .  Physical Exam   GENERAL: healthy, alert and no distress  EYES: Eyes grossly normal to inspection, PERRL and conjunctivae and sclerae normal  HENT: normal cephalic/atraumatic, right ear: With purulent fluid without erythema, left ear: normal: no effusions, no erythema, normal landmarks, no mastoid tenderness nose and mouth without ulcers or lesions, oropharynx clear and oral mucous membranes moist  NECK: no adenopathy, no asymmetry, masses, or scars and thyroid normal to palpation  RESP: lungs clear to auscultation - no rales, rhonchi or " wheezes  CV: regular rates and rhythm, no murmur, click or rub, peripheral pulses strong and no peripheral edema  MS: no gross musculoskeletal defects noted, no edema

## 2023-04-04 DIAGNOSIS — F60.3 BORDERLINE PERSONALITY DISORDER (H): ICD-10-CM

## 2023-04-04 DIAGNOSIS — F39 MOOD DISORDER (H): ICD-10-CM

## 2023-04-05 ENCOUNTER — MYC REFILL (OUTPATIENT)
Dept: FAMILY MEDICINE | Facility: CLINIC | Age: 23
End: 2023-04-05
Payer: COMMERCIAL

## 2023-04-05 DIAGNOSIS — F90.9 ATTENTION DEFICIT HYPERACTIVITY DISORDER (ADHD), UNSPECIFIED ADHD TYPE: ICD-10-CM

## 2023-04-05 NOTE — TELEPHONE ENCOUNTER
Pending Prescriptions:                       Disp   Refills    lamoTRIgine (LAMICTAL) 100 MG tablet [Phar*90 tab*0        Sig: TAKE 1 TABLET(100 MG) BY MOUTH DAILY      Routing refill request to provider for review/approval because:  Labs out of range:  ast. alt    Aliyah Marcum RN on 4/5/2023 at 1:01 PM

## 2023-04-06 RX ORDER — DEXTROAMPHETAMINE SACCHARATE, AMPHETAMINE ASPARTATE MONOHYDRATE, DEXTROAMPHETAMINE SULFATE, AMPHETAMINE SULFATE 12.5; 12.5; 12.5; 12.5 MG/1; MG/1; MG/1; MG/1
50 CAPSULE, EXTENDED RELEASE ORAL DAILY PRN
Qty: 30 CAPSULE | Refills: 0 | Status: SHIPPED | OUTPATIENT
Start: 2023-04-06 | End: 2023-05-03

## 2023-04-06 RX ORDER — LAMOTRIGINE 100 MG/1
TABLET ORAL
Qty: 90 TABLET | Refills: 0 | Status: SHIPPED | OUTPATIENT
Start: 2023-04-06 | End: 2023-09-08

## 2023-04-06 NOTE — TELEPHONE ENCOUNTER
Requested Prescriptions   Pending Prescriptions Disp Refills     Amphet-Dextroamphet 3-Bead ER 50 MG CP24 30 capsule 0     Sig: Take 50 mg by mouth daily as needed (ADHD)       There is no refill protocol information for this order           Routing refill request to provider for review/approval because:  Drug not on the [facility], [facility] or [facility] refill protocol or controlled substance

## 2023-04-30 ENCOUNTER — HEALTH MAINTENANCE LETTER (OUTPATIENT)
Age: 23
End: 2023-04-30

## 2023-05-03 ENCOUNTER — MYC REFILL (OUTPATIENT)
Dept: FAMILY MEDICINE | Facility: CLINIC | Age: 23
End: 2023-05-03
Payer: COMMERCIAL

## 2023-05-03 DIAGNOSIS — F90.9 ATTENTION DEFICIT HYPERACTIVITY DISORDER (ADHD), UNSPECIFIED ADHD TYPE: ICD-10-CM

## 2023-05-03 NOTE — TELEPHONE ENCOUNTER
Pending Prescriptions:                       Disp   Refills    Amphet-Dextroamphet 3-Bead ER 50 MG CP24   30 cap*0        Sig: Take 50 mg by mouth daily as needed (ADHD)    Routing refill request to provider for review/approval because:  Drug not on the [facility] refill protocol     PHQ-9 score:        3/31/2023     2:11 PM   PHQ   PHQ-9 Total Score 9   Q9: Thoughts of better off dead/self-harm past 2 weeks Several days   F/U: Thoughts of suicide or self-harm No   F/U: Safety concerns No         Requested Prescriptions   Pending Prescriptions Disp Refills    Amphet-Dextroamphet 3-Bead ER 50 MG CP24 30 capsule 0     Sig: Take 50 mg by mouth daily as needed (ADHD)       There is no refill protocol information for this order

## 2023-05-04 RX ORDER — DEXTROAMPHETAMINE SACCHARATE, AMPHETAMINE ASPARTATE MONOHYDRATE, DEXTROAMPHETAMINE SULFATE, AMPHETAMINE SULFATE 12.5; 12.5; 12.5; 12.5 MG/1; MG/1; MG/1; MG/1
50 CAPSULE, EXTENDED RELEASE ORAL DAILY PRN
Qty: 30 CAPSULE | Refills: 0 | Status: SHIPPED | OUTPATIENT
Start: 2023-05-04 | End: 2023-06-03

## 2023-05-24 ENCOUNTER — OFFICE VISIT (OUTPATIENT)
Dept: FAMILY MEDICINE | Facility: CLINIC | Age: 23
End: 2023-05-24
Payer: COMMERCIAL

## 2023-05-24 VITALS
TEMPERATURE: 98.3 F | OXYGEN SATURATION: 100 % | DIASTOLIC BLOOD PRESSURE: 84 MMHG | RESPIRATION RATE: 18 BRPM | BODY MASS INDEX: 44.2 KG/M2 | SYSTOLIC BLOOD PRESSURE: 127 MMHG | HEART RATE: 85 BPM | HEIGHT: 66 IN | WEIGHT: 275 LBS

## 2023-05-24 DIAGNOSIS — H66.91 RIGHT OTITIS MEDIA, UNSPECIFIED OTITIS MEDIA TYPE: ICD-10-CM

## 2023-05-24 PROCEDURE — 99213 OFFICE O/P EST LOW 20 MIN: CPT | Performed by: PHYSICIAN ASSISTANT

## 2023-05-24 RX ORDER — CEFDINIR 300 MG/1
600 CAPSULE ORAL DAILY
Qty: 14 CAPSULE | Refills: 0 | Status: SHIPPED | OUTPATIENT
Start: 2023-05-24 | End: 2023-12-01

## 2023-05-24 ASSESSMENT — PATIENT HEALTH QUESTIONNAIRE - PHQ9
10. IF YOU CHECKED OFF ANY PROBLEMS, HOW DIFFICULT HAVE THESE PROBLEMS MADE IT FOR YOU TO DO YOUR WORK, TAKE CARE OF THINGS AT HOME, OR GET ALONG WITH OTHER PEOPLE: SOMEWHAT DIFFICULT
SUM OF ALL RESPONSES TO PHQ QUESTIONS 1-9: 8
SUM OF ALL RESPONSES TO PHQ QUESTIONS 1-9: 8

## 2023-05-24 ASSESSMENT — PAIN SCALES - GENERAL: PAINLEVEL: SEVERE PAIN (7)

## 2023-05-24 NOTE — PROGRESS NOTES
Assessment & Plan     Right otitis media, unspecified otitis media type  Discussed antibiotic use and risk of cdiff- she states she will take probiotic and reports that symptoms improve with antibiotic use and then recur. States she has number for ENT and on antihistamine and flonase     - cefdinir (OMNICEF) 300 MG capsule  Dispense: 14 capsule; Refill: 0    Declines labs for her diabetes check.  Due for follow up with PCP now - has nothing on the books     Prescription drug management  22 minutes spent by me on the date of the encounter doing chart review, history and exam, documentation and further activities per the note       Patient Instructions   Take omnicef twice a day for 7 days   Schedule follow up with ENT as soon as possible  Return urgently if any change in symptoms.    Schedule appointment with your primary care provider as soon as possible       Joana Low PA-C  [facility]    Santa Herbert is a 22 year old, presenting for the following health issues:  Ear Problem (Bilateral ear infection on and off since 12/2022)        5/24/2023     2:48 PM   Additional Questions   Roomed by Crystal   Accompanied by self         5/24/2023     2:48 PM   Patient Reported Additional Medications   Patient reports taking the following new medications none     History of Present Illness       Reason for visit:  Same ear infection is back. Didnt get in with ENT    She eats 2-3 servings of fruits and vegetables daily.She consumes 0 sweetened beverage(s) daily.She exercises with enough effort to increase her heart rate 10 to 19 minutes per day.  She exercises with enough effort to increase her heart rate 3 or less days per week. She is missing 1 dose(s) of medications per week.  She is not taking prescribed medications regularly due to other.    Today's PHQ-9         PHQ-9 Total Score: 8    PHQ-9 Q9 Thoughts of better off dead/self-harm past 2 weeks :   Not at all    How difficult have  "these problems made it for you to do your work, take care of things at home, or get along with other people: Somewhat difficult       PT STATES THAT THE EAR INFECTION HAS BEEN GOING ON FOR 5 MONTHS    Patient unknown to me with history of recurrent otitis presents with Right ear pain for 4-5 days- reports worse since allergies worse. Takes   certizine and fluconazole for allergies   Had been seen and given course of omnicef and improved but then worse again.  Reports amoxicillin made her ill and never took.  No fever.  When allergies are super bad has to struggle to take a deep breath at time..  Mom is on advair. She reports that her allergies are rarely that bad and declines inhaler.   History of diabetes- declines labs reports that she has an upcoming appointment for physical with [facility] for eating disorders- she does not check her blood sugars- last A1C 11 months ago  She does not have any upcoming appointments with her primary care provider    Works as a             Review of Systems   Constitutional, HEENT, cardiovascular, pulmonary, gi and gu systems are negative, except as otherwise noted.      Objective    /84 (BP Location: Right arm, Patient Position: Sitting, Cuff Size: Adult Large)   Pulse 85   Temp 98.3  F (36.8  C) (Oral)   Resp 18   Ht 1.67 m (5' 5.75\")   Wt 124.7 kg (275 lb)   LMP 05/20/2023   SpO2 100%   BMI 44.73 kg/m    Body mass index is 44.73 kg/m .  Physical Exam   GENERAL: alert, no distress and obese  EYES: Eyes grossly normal to inspection, PERRL and conjunctivae and sclerae normal  HENT: normal cephalic/atraumatic, right ear: clear effusion, left ear: normal: no effusions, no erythema, normal landmarks, nose and mouth without ulcers or lesions, oropharynx clear and oral mucous membranes moist  NECK: no adenopathy, no asymmetry, masses, or scars and thyroid normal to palpation  RESP: lungs clear to auscultation - no rales, rhonchi or wheezes  CV: regular rate and " rhythm, normal S1 S2, no S3 or S4, no murmur, click or rub, no peripheral edema and peripheral pulses strong  MS: no gross musculoskeletal defects noted, no edema  PSYCH: mentation appears normal, affect normal/bright

## 2023-05-24 NOTE — PATIENT INSTRUCTIONS
Take omnicef twice a day for 7 days   Schedule follow up with ENT as soon as possible  Return urgently if any change in symptoms.    Schedule appointment with your primary care provider as soon as possible

## 2023-06-03 ENCOUNTER — MYC REFILL (OUTPATIENT)
Dept: FAMILY MEDICINE | Facility: CLINIC | Age: 23
End: 2023-06-03
Payer: COMMERCIAL

## 2023-06-03 DIAGNOSIS — F90.9 ATTENTION DEFICIT HYPERACTIVITY DISORDER (ADHD), UNSPECIFIED ADHD TYPE: ICD-10-CM

## 2023-06-06 RX ORDER — DEXTROAMPHETAMINE SACCHARATE, AMPHETAMINE ASPARTATE MONOHYDRATE, DEXTROAMPHETAMINE SULFATE, AMPHETAMINE SULFATE 12.5; 12.5; 12.5; 12.5 MG/1; MG/1; MG/1; MG/1
50 CAPSULE, EXTENDED RELEASE ORAL DAILY PRN
Qty: 30 CAPSULE | Refills: 0 | Status: SHIPPED | OUTPATIENT
Start: 2023-06-06 | End: 2023-07-11

## 2023-06-06 NOTE — TELEPHONE ENCOUNTER
Routing refill request to provider for review/approval because:    Requested Prescriptions   Pending Prescriptions Disp Refills    Amphet-Dextroamphet 3-Bead ER 50 MG CP24 30 capsule 0     Sig: Take 50 mg by mouth daily as needed (ADHD)       There is no refill protocol information for this order

## 2023-06-08 ENCOUNTER — TELEPHONE (OUTPATIENT)
Dept: FAMILY MEDICINE | Facility: CLINIC | Age: 23
End: 2023-06-08
Payer: COMMERCIAL

## 2023-06-08 ENCOUNTER — TRANSFERRED RECORDS (OUTPATIENT)
Dept: MULTI SPECIALTY CLINIC | Facility: CLINIC | Age: 23
End: 2023-06-08

## 2023-06-08 LAB — HBA1C MFR BLD: 6 %

## 2023-06-19 NOTE — Clinical Note
SARAH- has nothing on the books for you and from your notes looks like she is due for follow up with you  no

## 2023-06-26 NOTE — TELEPHONE ENCOUNTER
Called pharmacy to obtain new insurance information -     BIN [id number]  [facility] [address]  ID [id number]  
Central Prior Authorization Team   Phone: [phone]      Tried contacting patient multiple times with no answer. I will inform clinic so they can resend the request when we have all the insurance processing information from patient.  
Central Prior Authorization Team   Phone: [phone]      PA Initiation    Medication: MYDAYIS 50 MG PO CP24  Insurance Company: [employer] - Phone [phone] Fax [phone]  Pharmacy Filling the Rx: [id number] - [place], [place] - [facility] [address] [facility] AT [address]  Filling Pharmacy Phone: [phone]  Filling Pharmacy Fax:    Start Date: 6/14/2023      
Prior Authorization Follow-Up  Tried calling patient again - no answer so left v/m for a call back.  
Prior Authorization Retail Medication Request    Medication/Dose: Amphet-Dextroamphet 3-Bead ER 50 MG CP24  ICD code (if different than what is on RX):  Attention deficit hyperactivity disorder (ADHD), unspecified ADHD type [F90.9]   Previously Tried and Failed:    Rationale:      Insurance Name:  [employer]  Insurance ID:     [id number]           Pharmacy Information (if different than what is on RX)  Name:  [facility]  Phone:  [phone]  
Received error message from [facility] -       Tried calling patient, no answer so left v/m requesting a call back.   
No

## 2023-07-07 ENCOUNTER — OFFICE VISIT (OUTPATIENT)
Dept: ORTHOPEDICS | Facility: CLINIC | Age: 23
End: 2023-07-07
Payer: COMMERCIAL

## 2023-07-07 ENCOUNTER — ANCILLARY PROCEDURE (OUTPATIENT)
Dept: GENERAL RADIOLOGY | Facility: CLINIC | Age: 23
End: 2023-07-07
Attending: FAMILY MEDICINE
Payer: COMMERCIAL

## 2023-07-07 VITALS
SYSTOLIC BLOOD PRESSURE: 163 MMHG | HEIGHT: 65 IN | BODY MASS INDEX: 45.82 KG/M2 | DIASTOLIC BLOOD PRESSURE: 113 MMHG | WEIGHT: 275 LBS

## 2023-07-07 DIAGNOSIS — R20.2 NUMBNESS AND TINGLING IN LEFT HAND: ICD-10-CM

## 2023-07-07 DIAGNOSIS — M79.602 LEFT ARM PAIN: ICD-10-CM

## 2023-07-07 DIAGNOSIS — M54.2 CERVICALGIA: ICD-10-CM

## 2023-07-07 DIAGNOSIS — R29.898 LEFT ARM WEAKNESS: ICD-10-CM

## 2023-07-07 DIAGNOSIS — R20.0 NUMBNESS AND TINGLING IN LEFT HAND: Primary | ICD-10-CM

## 2023-07-07 DIAGNOSIS — R20.0 NUMBNESS AND TINGLING IN LEFT HAND: ICD-10-CM

## 2023-07-07 DIAGNOSIS — R20.2 NUMBNESS AND TINGLING IN LEFT HAND: Primary | ICD-10-CM

## 2023-07-07 PROCEDURE — 99214 OFFICE O/P EST MOD 30 MIN: CPT | Performed by: FAMILY MEDICINE

## 2023-07-07 PROCEDURE — 72040 X-RAY EXAM NECK SPINE 2-3 VW: CPT | Mod: TC | Performed by: RADIOLOGY

## 2023-07-07 RX ORDER — PREDNISONE 20 MG/1
20 TABLET ORAL DAILY
Qty: 10 TABLET | Refills: 0 | Status: SHIPPED | OUTPATIENT
Start: 2023-07-07 | End: 2023-12-01

## 2023-07-07 NOTE — PROGRESS NOTES
"Tana Hernandez  :  2000  DOS: 2023  MRN: [id number]    Sports Medicine Clinic Visit    PCP: Gina Ferraro    Tana Hernandez is a 23 year old Right hand dominant female who is seen as a self referral presenting with Left arm pain/numbness/tingling.    Had seen Dr. Baeza 5/10/2022    Injury: Gradual onset of pain over the past 1-2 year(s).  Pain located over left shoulder, radiating to fingers (ring and long fingers primarily, pain in thumb and sometimes radiating to hypothenar).  Reports intermittent radiating, but notes that the last week it has been constant, numbness and tingling, pain and weakness to fine motor skills (drawing vaccines), notes that pain increases with sitting at the base of the skull.  Additional Features:  Positive: paresthesias, numbness, weakness and pain in other joints (notes hypermobility).  Symptoms are better with Tylenol, Ibuprofen and lidocaine patch.  Symptoms are worse with: Unsure, but notes that turning the head makes her dizzy, sitting makes it worse, noting pressure in head and neck.  Other evaluation and/or treatments so far consists of: Tylenol and Ibuprofen.  Recent imaging completed: No recent imaging completed.  Prior History of related problems: 5/10/2022    Social History: currently employed as Vet Technician    Review of Systems  Musculoskeletal: as above  Remainder of review of systems is negative including constitutional, CV, pulmonary, GI, Skin and Neurologic except as noted in HPI or medical history.    Past Medical History:   Diagnosis Date     Anxiety      Depressive disorder      No past surgical history on file.  No family history on file.    Objective  BP (!) 163/113   Ht 1.651 m (5' 5\")   Wt 124.7 kg (275 lb)   LMP 2023   BMI 45.76 kg/m        General: healthy, alert and in no distress      HEENT: no scleral icterus or conjunctival erythema     Skin: no suspicious lesions or rash. No jaundice.     CV: regular rhythm by palpation, " 2+ distal pulses, no pedal edema      Resp: normal respiratory effort without conversational dyspnea     Psych: normal mood and affect      Gait: nonantalgic, appropriate coordination and balance     Neuro: normal light touch sensory exam of the extremities. Motor strength as noted below     Left Shoulder exam    ROM:        Full active and passive ROM with flexion, extension, abduction, internal and external rotation.    Tender:        Mild generalized TTP about the upper and posterior shoulder musculature, active spasm and TART changes in the upper trapezius, levator scapula, posterior scalenes, supraspinatus, cervical paraspinals    Non Tender:       remainder of shoulder       sternoclavicular joint       acromioclavicular joint       subacromial space       posterior shoulder    Strength:        abduction 5-/5       internal rotation 5/5       external rotation 5-/5       adduction 5/5    Impingement testing:        neg (-) Neer       positive (+) Fonseca       neg (-) empty can       neg (-) crossover       neg (-) crank    Stability testing:       neg (-) anterior glide       neg (-) sulcus sign    Skin:       no visible deformities       well perfused       capillary refill brisk    Sensation:        normal sensation over shoulder and upper extremity     Cervical spine Exam  Inspection: normal cervical lordosis  Tender:  medial border of scapula, superior angle of scapula, left paracervical muscles > right paracervical muscles, left trapezius muscles > right trapezius muscles  Non-tender:  spinous processes  Range of Motion:  Full ROM of cervical spine, pain with most movements L>R  Strength: Full strength of all neck muscles  Special tests:  Spurling's - left - equivocal, Spurling's - negative - right, equivocal left adson's      Radiology  Recent Results (from the past 744 hour(s))   XR Cervical Spine 2/3 vws    Narrative    CERVICAL SPINE TWO TO THREE VIEWS  7/7/2023 8:29 AM     HISTORY: Numbness and  tingling in left hand.    COMPARISON: None.      Impression    IMPRESSION: No gross vertebral body height loss is identified. There  is slight reversal of the normal cervical lordosis. Alignment  otherwise appears within normal limits. The intervertebral disc spaces  appear relatively maintained in height. No advanced facet arthropathy  is identified. The prevertebral soft tissues appear normal in  thickness. Small metallic density structure projecting over the upper  mediastinal region on the frontal view only may be external to the  patient; recommend clinical correlation.       Assessment:  1. Numbness and tingling in left hand    2. Left arm pain    3. Cervicalgia    4. Left arm weakness        Plan:  Discussed the assessment with the patient.  Follow up: prn based on short term clinical progress  Acute flare of chronic left sided neck and arm sx, complicated by multiple traumatic life events recently  Constellation of sx on hx and exam have components of cervical radiculopathy, neurogenic TOS, trigger points  Hx of generalized hypermobility, can also play a role  XR images independently visualized and reviewed with patient today in clinic  Straightening of cervical lordosis from muscle tension, no other acute findings  MRI ordered today to eval for possible cervical nerve/disc pathology, especially given acute on chronic and some weakness and significant abnormal sensation in the left UE  Consider EMG for further diagnostic clarity based on progress clinically  Offered trial of oral steroid burst, prefer to limit steroid as much as possible but may be helpful if sx not improving  Potential for muscle relaxer vs gabapentin also reviewed, can initiate anytime if desired, would choose one or the other to start  Will avoid opiate pain medications, and she is not interested  Oral Tylenol and topical Voltaren gel reviewed as safe OTC options, reviewed safe dosing strategies  PT strongly encouraged, declined for now,  available anytime, offered online resources as time/cost are considerations in the short term  Will contact with MRi results if obtained  Work note provided  Consider further referral in the future based on progress/diagnostics  Continue with weightloss, has done with diet/activity  Posture control reviewed in detail  We discussed modified progressive pain-free activity as tolerated  Home handouts provided and supportive care reviewed  All questions were answered today  Contact us with additional questions or concerns  Signs and sx of concern reviewed      Axel Schafer DO, CAJUSTYN  Sports Medicine Physician  [facility] and Sports Medicine      Time spent in chart review, one-on-one evaluation, discussion with patient regarding: nature of problem, clinical course, prior treatments, therapeutic options, shared-decision making, potential procedures and referrals, and charting related to the visit: 38 minutes.  If applicable, time does not include time spent performing any procedure.          Disclaimer: This note consists of symbols derived from keyboarding, dictation and/or voice recognition software. As a result, there may be errors in the script that have gone undetected. Please consider this when interpreting information found in this chart.

## 2023-07-07 NOTE — LETTER
2023         RE: Tana Hernandez  [id number] [facility] [address]        Dear Colleague,    Thank you for referring your patient, Tana Hernandez, to the [facility]. Please see a copy of my visit note below.    Tana Hernandez  :  2000  DOS: 2023  MRN: [id number]    Sports Medicine Clinic Visit    PCP: Gina Ferraro    Tana Hernandez is a 23 year old Right hand dominant female who is seen as a self referral presenting with Left arm pain/numbness/tingling.    Had seen Dr. Baeza 5/10/2022    Injury: Gradual onset of pain over the past 1-2 year(s).  Pain located over left shoulder, radiating to fingers (ring and long fingers primarily, pain in thumb and sometimes radiating to hypothenar).  Reports intermittent radiating, but notes that the last week it has been constant, numbness and tingling, pain and weakness to fine motor skills (drawing vaccines), notes that pain increases with sitting at the base of the skull.  Additional Features:  Positive: paresthesias, numbness, weakness and pain in other joints (notes hypermobility).  Symptoms are better with Tylenol, Ibuprofen and lidocaine patch.  Symptoms are worse with: Unsure, but notes that turning the head makes her dizzy, sitting makes it worse, noting pressure in head and neck.  Other evaluation and/or treatments so far consists of: Tylenol and Ibuprofen.  Recent imaging completed: No recent imaging completed.  Prior History of related problems: 5/10/2022    Social History: currently employed as Vet Technician    Review of Systems  Musculoskeletal: as above  Remainder of review of systems is negative including constitutional, CV, pulmonary, GI, Skin and Neurologic except as noted in HPI or medical history.    Past Medical History:   Diagnosis Date     Anxiety      Depressive disorder      No past surgical history on file.  No family history on file.    Objective  BP (!) 163/113   Ht 1.651 m (5'  "5\")   Wt 124.7 kg (275 lb)   LMP 05/20/2023   BMI 45.76 kg/m        General: healthy, alert and in no distress      HEENT: no scleral icterus or conjunctival erythema     Skin: no suspicious lesions or rash. No jaundice.     CV: regular rhythm by palpation, 2+ distal pulses, no pedal edema      Resp: normal respiratory effort without conversational dyspnea     Psych: normal mood and affect      Gait: nonantalgic, appropriate coordination and balance     Neuro: normal light touch sensory exam of the extremities. Motor strength as noted below     Left Shoulder exam    ROM:        Full active and passive ROM with flexion, extension, abduction, internal and external rotation.    Tender:        Mild generalized TTP about the upper and posterior shoulder musculature, active spasm and TART changes in the upper trapezius, levator scapula, posterior scalenes, supraspinatus, cervical paraspinals    Non Tender:       remainder of shoulder       sternoclavicular joint       acromioclavicular joint       subacromial space       posterior shoulder    Strength:        abduction 5-/5       internal rotation 5/5       external rotation 5-/5       adduction 5/5    Impingement testing:        neg (-) Neer       positive (+) Fonseca       neg (-) empty can       neg (-) crossover       neg (-) crank    Stability testing:       neg (-) anterior glide       neg (-) sulcus sign    Skin:       no visible deformities       well perfused       capillary refill brisk    Sensation:        normal sensation over shoulder and upper extremity     Cervical spine Exam  Inspection: normal cervical lordosis  Tender:  medial border of scapula, superior angle of scapula, left paracervical muscles > right paracervical muscles, left trapezius muscles > right trapezius muscles  Non-tender:  spinous processes  Range of Motion:  Full ROM of cervical spine, pain with most movements L>R  Strength: Full strength of all neck muscles  Special tests:  " Spurling's - left - equivocal, Spurling's - negative - right, equivocal left adson's      Radiology  Recent Results (from the past 744 hour(s))   XR Cervical Spine 2/3 vws    Narrative    CERVICAL SPINE TWO TO THREE VIEWS  7/7/2023 8:29 AM     HISTORY: Numbness and tingling in left hand.    COMPARISON: None.      Impression    IMPRESSION: No gross vertebral body height loss is identified. There  is slight reversal of the normal cervical lordosis. Alignment  otherwise appears within normal limits. The intervertebral disc spaces  appear relatively maintained in height. No advanced facet arthropathy  is identified. The prevertebral soft tissues appear normal in  thickness. Small metallic density structure projecting over the upper  mediastinal region on the frontal view only may be external to the  patient; recommend clinical correlation.       Assessment:  1. Numbness and tingling in left hand    2. Left arm pain    3. Cervicalgia    4. Left arm weakness        Plan:  Discussed the assessment with the patient.  Follow up: prn based on short term clinical progress  Acute flare of chronic left sided neck and arm sx, complicated by multiple traumatic life events recently  Constellation of sx on hx and exam have components of cervical radiculopathy, neurogenic TOS, trigger points  Hx of generalized hypermobility, can also play a role  XR images independently visualized and reviewed with patient today in clinic  Straightening of cervical lordosis from muscle tension, no other acute findings  MRI ordered today to eval for possible cervical nerve/disc pathology, especially given acute on chronic and some weakness and significant abnormal sensation in the left UE  Consider EMG for further diagnostic clarity based on progress clinically  Offered trial of oral steroid burst, prefer to limit steroid as much as possible but may be helpful if sx not improving  Potential for muscle relaxer vs gabapentin also reviewed, can initiate  anytime if desired, would choose one or the other to start  Will avoid opiate pain medications, and she is not interested  Oral Tylenol and topical Voltaren gel reviewed as safe OTC options, reviewed safe dosing strategies  PT strongly encouraged, declined for now, available anytime, offered online resources as time/cost are considerations in the short term  Will contact with MRi results if obtained  Work note provided  Consider further referral in the future based on progress/diagnostics  Continue with weightloss, has done with diet/activity  Posture control reviewed in detail  We discussed modified progressive pain-free activity as tolerated  Home handouts provided and supportive care reviewed  All questions were answered today  Contact us with additional questions or concerns  Signs and sx of concern reviewed      Axel Schafer DO, CAQ  Sports Medicine Physician  [facility] and Sports Medicine      Time spent in chart review, one-on-one evaluation, discussion with patient regarding: nature of problem, clinical course, prior treatments, therapeutic options, shared-decision making, potential procedures and referrals, and charting related to the visit: 38 minutes.  If applicable, time does not include time spent performing any procedure.          Disclaimer: This note consists of symbols derived from keyboarding, dictation and/or voice recognition software. As a result, there may be errors in the script that have gone undetected. Please consider this when interpreting information found in this chart.      Again, thank you for allowing me to participate in the care of your patient.        Sincerely,        Axel Schaefr DO

## 2023-07-07 NOTE — LETTER
July 7, 2023      Piedad was seen in my office today and is under my care.  She will have a lift/carry restriction of 25 pounds for the next 6 weeks.  Limit work above shoulder height where possible.  She should also strictly modify or eliminate any activity that increases her pain or feels unsafe.  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  She can otherwise work as tolerated.  Any absence from work over the next 6 weeks due to increased pain should be considered medically excused.  Updated recommendations will be provided as needed.      Axel Hendrickson DO, CAQ  Sports Medicine Physician  [facility] and Sports Medicine

## 2023-07-11 ENCOUNTER — MYC REFILL (OUTPATIENT)
Dept: FAMILY MEDICINE | Facility: CLINIC | Age: 23
End: 2023-07-11
Payer: COMMERCIAL

## 2023-07-11 DIAGNOSIS — F90.9 ATTENTION DEFICIT HYPERACTIVITY DISORDER (ADHD), UNSPECIFIED ADHD TYPE: ICD-10-CM

## 2023-07-11 NOTE — TELEPHONE ENCOUNTER
Requested Prescriptions   Pending Prescriptions Disp Refills     Amphet-Dextroamphet 3-Bead ER 50 MG CP24 30 capsule 0     Sig: Take 50 mg by mouth daily as needed (ADHD)       There is no refill protocol information for this order          Routing refill request to provider for review/approval because:  Drug not on the [facility], [facility] or [facility] refill protocol or controlled substance

## 2023-07-12 RX ORDER — DEXTROAMPHETAMINE SACCHARATE, AMPHETAMINE ASPARTATE MONOHYDRATE, DEXTROAMPHETAMINE SULFATE, AMPHETAMINE SULFATE 12.5; 12.5; 12.5; 12.5 MG/1; MG/1; MG/1; MG/1
50 CAPSULE, EXTENDED RELEASE ORAL DAILY PRN
Qty: 30 CAPSULE | Refills: 0 | Status: SHIPPED | OUTPATIENT
Start: 2023-07-12 | End: 2023-08-09

## 2023-07-25 NOTE — TELEPHONE ENCOUNTER
DIAGNOSIS: Nerve pain- left arm/hand - getting an MRI on 07/27/23   APPOINTMENT DATE: 07/28/23   NOTES STATUS DETAILS   OFFICE NOTE from referring provider Internal 07/07/23- [facility]:  - Axel Schafer,    [facility]ICE [facility] specialist [facility]  - Axel Baeza,    MEDICATION LIST Internal    MRI Internal 07/27/23- [facility]:  - MR Cervical Spine - Axel Schafer DO   XRAYS (IMAGES & REPORTS) Internal 07/07/23- [facility]:  - XR Cervical Spine - Axel Schafer,

## 2023-07-26 ENCOUNTER — MYC MEDICAL ADVICE (OUTPATIENT)
Dept: ORTHOPEDICS | Facility: CLINIC | Age: 23
End: 2023-07-26

## 2023-07-27 NOTE — TELEPHONE ENCOUNTER
PETRA LVM for patient inquiring about appointment on 7/28/2023 with Dr. Garcia.  ATC explained that if the appointment is to review results from cervical spine MRI, then she should follow up with Dr. Schafer as he is the ordering physician.    ATC explained she has touched base with Dr. Schafer's team and they confirmed that Dr. Schafer will be reaching out to patient to review MRI results when they're available.    ATC provided call back number of [phone] or she may respond to my chart message sent on 7/26/2023.    PETRA Burroughs

## 2023-07-28 ENCOUNTER — PRE VISIT (OUTPATIENT)
Dept: ORTHOPEDICS | Facility: CLINIC | Age: 23
End: 2023-07-28

## 2023-08-04 ENCOUNTER — ANCILLARY PROCEDURE (OUTPATIENT)
Dept: MRI IMAGING | Facility: CLINIC | Age: 23
End: 2023-08-04
Attending: FAMILY MEDICINE
Payer: COMMERCIAL

## 2023-08-04 DIAGNOSIS — R20.0 NUMBNESS AND TINGLING IN LEFT HAND: ICD-10-CM

## 2023-08-04 DIAGNOSIS — M54.2 CERVICALGIA: ICD-10-CM

## 2023-08-04 DIAGNOSIS — R20.2 NUMBNESS AND TINGLING IN LEFT HAND: ICD-10-CM

## 2023-08-04 DIAGNOSIS — K21.9 GASTROESOPHAGEAL REFLUX DISEASE WITHOUT ESOPHAGITIS: ICD-10-CM

## 2023-08-04 DIAGNOSIS — M79.602 LEFT ARM PAIN: ICD-10-CM

## 2023-08-04 PROCEDURE — 72141 MRI NECK SPINE W/O DYE: CPT | Mod: TC | Performed by: RADIOLOGY

## 2023-08-04 RX ORDER — FAMOTIDINE 40 MG/1
TABLET, FILM COATED ORAL
Qty: 90 TABLET | Refills: 1 | Status: SHIPPED | OUTPATIENT
Start: 2023-08-04 | End: 2024-06-25

## 2023-08-09 ENCOUNTER — MYC REFILL (OUTPATIENT)
Dept: FAMILY MEDICINE | Facility: CLINIC | Age: 23
End: 2023-08-09
Payer: COMMERCIAL

## 2023-08-09 DIAGNOSIS — F90.9 ATTENTION DEFICIT HYPERACTIVITY DISORDER (ADHD), UNSPECIFIED ADHD TYPE: ICD-10-CM

## 2023-08-09 NOTE — TELEPHONE ENCOUNTER
Requested Prescriptions   Pending Prescriptions Disp Refills    Amphet-Dextroamphet 3-Bead ER 50 MG CP24 30 capsule 0     Sig: Take 50 mg by mouth daily as needed (ADHD)     Routing refill request to provider for review/approval because:  Drug not on the G, P or [facility] refill protocol or controlled substance

## 2023-08-10 ENCOUNTER — TELEPHONE (OUTPATIENT)
Dept: ORTHOPEDICS | Facility: CLINIC | Age: 23
End: 2023-08-10

## 2023-08-10 RX ORDER — DEXTROAMPHETAMINE SACCHARATE, AMPHETAMINE ASPARTATE MONOHYDRATE, DEXTROAMPHETAMINE SULFATE, AMPHETAMINE SULFATE 12.5; 12.5; 12.5; 12.5 MG/1; MG/1; MG/1; MG/1
50 CAPSULE, EXTENDED RELEASE ORAL DAILY PRN
Qty: 30 CAPSULE | Refills: 0 | Status: SHIPPED | OUTPATIENT
Start: 2023-08-10 | End: 2023-09-18

## 2023-08-10 NOTE — TELEPHONE ENCOUNTER
LVM for Piedad advising a call-back to go over MRI results or we will send a [employer] Message if we do not hear from her.  Rico Dukes, LAT, ATC

## 2023-08-10 NOTE — TELEPHONE ENCOUNTER
Please contact Piedad with MRI results.  Overall very reassuring in that there appears to be no significant disc/nerve pathology. Very mild disc bulges in lower cervical region, but no narrowing of nerve tunnels or compression on nerves noted. I continune to strongly recommend PT for her, but time/cost was an issue, I provided some online options previously, curious if she has tried any of this.  Also discussed at visit option for EMG nerve test to help with diagnosis, may or may not give more information but available anytime if she has ongoing sx.  Can discuss further if needed based on her update.    Thanks,     Axel Schafer DO, CAQ  Sports Medicine Physician  [facility]

## 2023-08-14 NOTE — TELEPHONE ENCOUNTER
JAYLINM for return call to discuss MRI results and recommendations.  Will send patient results note in MyChart.    Nicolás Maldonado ATC

## 2023-09-08 DIAGNOSIS — F39 MOOD DISORDER (H): ICD-10-CM

## 2023-09-08 DIAGNOSIS — F60.3 BORDERLINE PERSONALITY DISORDER (H): ICD-10-CM

## 2023-09-08 RX ORDER — FLUOXETINE 40 MG/1
CAPSULE ORAL
Qty: 90 CAPSULE | Refills: 0 | Status: SHIPPED | OUTPATIENT
Start: 2023-09-08 | End: 2023-12-01

## 2023-09-08 RX ORDER — LAMOTRIGINE 100 MG/1
TABLET ORAL
Qty: 90 TABLET | Refills: 0 | Status: SHIPPED | OUTPATIENT
Start: 2023-09-08 | End: 2023-12-01

## 2023-09-18 ENCOUNTER — MYC REFILL (OUTPATIENT)
Dept: FAMILY MEDICINE | Facility: CLINIC | Age: 23
End: 2023-09-18
Payer: COMMERCIAL

## 2023-09-18 DIAGNOSIS — F90.9 ATTENTION DEFICIT HYPERACTIVITY DISORDER (ADHD), UNSPECIFIED ADHD TYPE: ICD-10-CM

## 2023-09-18 RX ORDER — DEXTROAMPHETAMINE SACCHARATE, AMPHETAMINE ASPARTATE MONOHYDRATE, DEXTROAMPHETAMINE SULFATE, AMPHETAMINE SULFATE 12.5; 12.5; 12.5; 12.5 MG/1; MG/1; MG/1; MG/1
50 CAPSULE, EXTENDED RELEASE ORAL DAILY PRN
Qty: 30 CAPSULE | Refills: 0 | Status: SHIPPED | OUTPATIENT
Start: 2023-09-18 | End: 2023-10-18

## 2023-10-01 ENCOUNTER — HEALTH MAINTENANCE LETTER (OUTPATIENT)
Age: 23
End: 2023-10-01

## 2023-10-05 ENCOUNTER — TELEPHONE (OUTPATIENT)
Dept: ORTHOPEDICS | Facility: CLINIC | Age: 23
End: 2023-10-05

## 2023-10-05 NOTE — TELEPHONE ENCOUNTER
Talked with Piedad,  Piedad wants to follow-up with Dr. Schafer. Would like her upcoming appt. With dr. Baeza on 10/10 cancelled as she has an appt. With Dr. Schafer on Friday 10/13. Appt. Cancelled.  Rico Dukes, LAT, ATC

## 2023-10-10 NOTE — PROGRESS NOTES
Tana Hernandez  :  2000  DOS: 10/13/23  MRN: [id number]    Sports Medicine Clinic Visit    PCP: Gina Ferraro    Tana Hernandez is a 23 year old Right hand dominant female who is seen as a self referral presenting with Left arm pain/numbness/tingling.    Had seen Dr. Baeza 5/10/2022    Injury: Gradual onset of pain over the past 1-2 year(s).  Pain located over left shoulder, radiating to fingers (ring and long fingers primarily, pain in thumb and sometimes radiating to hypothenar).  Reports intermittent radiating, but notes that the last week it has been constant, numbness and tingling, pain and weakness to fine motor skills (drawing vaccines), notes that pain increases with sitting at the base of the skull.  Additional Features:  Positive: paresthesias, numbness, weakness and pain in other joints (notes hypermobility).  Symptoms are better with Tylenol, Ibuprofen and lidocaine patch.  Symptoms are worse with: Unsure, but notes that turning the head makes her dizzy, sitting makes it worse, noting pressure in head and neck.  Other evaluation and/or treatments so far consists of: Tylenol and Ibuprofen.  Recent imaging completed: No recent imaging completed.  Prior History of related problems: 5/10/2022    Social History: currently employed as Vet Technician    Interim History - 2023  Since last visit on 2023 patient states that her shoulder got better with restrictions and taking it easy, but more recently she is having more pain. Complains of her shoulder feeling cold and numbness and tingling in 3rd and 4th fingers, also having pain with neck rotation. Denies trying PT and currently taking ibuprofen and tylenol prn. Has muscle relaxer's, ut refrains from using them as much as possible.   No interim injury.       Review of Systems  Musculoskeletal: as above  Remainder of review of systems is negative including constitutional, CV, pulmonary, GI, Skin and Neurologic except as noted  in HPI or medical history.    Past Medical History:   Diagnosis Date    Anxiety     Depressive disorder      No past surgical history on file.  No family history on file.    Objective  BP (!) 155/101   Pulse 92   Wt 124.7 kg (275 lb)   BMI 45.76 kg/m      General: healthy, alert and in no distress    HEENT: no scleral icterus or conjunctival erythema   Skin: no suspicious lesions or rash. No jaundice.   CV: regular rhythm by palpation, 2+ distal pulses, no pedal edema    Resp: normal respiratory effort without conversational dyspnea   Psych: normal mood and affect    Gait: nonantalgic, appropriate coordination and balance   Neuro: normal light touch sensory exam of the extremities. Motor strength as noted below     Left Shoulder exam    ROM:        Mildly limited terminal active and passive ROM with flexion, extension, abduction, internal and external rotation.    Tender:        Mild generalized TTP about the upper and posterior shoulder musculature, active spasm and TART changes in the upper trapezius, levator scapula, posterior scalenes, supraspinatus, cervical paraspinals       Also tender subacromial space, lateral deltoid, anterior GH joint    Non Tender:       remainder of shoulder       sternoclavicular joint       acromioclavicular joint       posterior shoulder    Strength:        abduction 5-/5       internal rotation 5/5       external rotation 5-/5       adduction 5/5    Impingement testing:        neg (-) Neer       positive (+) Fonseca       Positive empty can       neg (-) crossover       positive crank    Stability testing:       neg (-) anterior glide       neg (-) sulcus sign    Skin:       no visible deformities       well perfused       capillary refill brisk    Sensation:        normal sensation over shoulder and upper extremity     Cervical spine Exam  Inspection: normal cervical lordosis  Tender:  medial border of scapula, superior angle of scapula, left paracervical muscles > right  paracervical muscles, left trapezius muscles > right trapezius muscles  Non-tender:  spinous processes  Range of Motion:  Full ROM of cervical spine, pain with most movements L>R  Strength: Full strength of all neck muscles  Special tests:  Spurling's - left - equivocal, Spurling's - negative - right, equivocal left adson's      Radiology  Recent Results (from the past 744 hour(s))   XR Cervical Spine 2/3 vws    Narrative    CERVICAL SPINE TWO TO THREE VIEWS  7/7/2023 8:29 AM     HISTORY: Numbness and tingling in left hand.    COMPARISON: None.      Impression    IMPRESSION: No gross vertebral body height loss is identified. There  is slight reversal of the normal cervical lordosis. Alignment  otherwise appears within normal limits. The intervertebral disc spaces  appear relatively maintained in height. No advanced facet arthropathy  is identified. The prevertebral soft tissues appear normal in  thickness. Small metallic density structure projecting over the upper  mediastinal region on the frontal view only may be external to the  patient; recommend clinical correlation.     MRI CERVICAL SPINE WITHOUT CONTRAST 8/4/2023 11:06 AM      HISTORY: Left arm pain. Numbness and tingling in left hand.      TECHNIQUE: Multiplanar, multisequence MRI of the cervical spine  without contrast.      COMPARISON: Cervical spine x-ray 7/7/2023.      FINDINGS: Straightening of the normal cervical lordosis which may be  positional. No loss of vertebral body height. No focal destructive  bony lesions. No STIR hyperintense endplate edema (Modic type I  changes).     No abnormal signal appreciated within the visualized spinal cord.     Level by level as follows:      C2-C3: No loss of disc height. No significant disc herniation. Normal  facets. No spinal canal or neural foraminal narrowing.      C3-C4: No loss of disc height. No significant disc herniation. Normal  facets. No spinal canal or neural foraminal narrowing.      C4-C5: No loss  of disc height. No significant disc herniation. Normal  facets. No spinal canal or neural foraminal narrowing.      C5-C6: No loss of disc height. Shallow circumferential disc bulge.  Normal facets. No spinal canal or neural foraminal narrowing.      C6-C7: No loss of disc height. Shallow circumferential disc bulge.  Normal facets. No spinal canal or neural foraminal narrowing.      C7-T1: No loss of disc height. No significant disc herniation. Normal  facets. No spinal canal or neural foraminal narrowing.      Paraspinous soft tissues are unremarkable.                                                                       IMPRESSION:    1. Mild degenerative disc changes with shallow disc bulges at C5-C6  and C6-C7.  2. No spinal canal or neural foraminal narrowing at any level.       Assessment:  1. Left arm pain    2. Numbness and tingling in left hand    3. Thoracic outlet syndrome of left thoracic outlet    4. Chronic left shoulder pain    5. Hx of closed dislocation of shoulder    6. Cervicalgia          Plan:  Discussed the assessment with the patient.  Follow up: prn based on short term clinical progress  Ongoing but improving flare of chronic left sided neck and arm sx, complicated by multiple traumatic life events recently  Constellation of sx on hx and exam have components of cervical radiculopathy, neurogenic TOS, trigger points  Also wih more persistent should joint and RTC pain today  MRI ordered for better diagnostic clarity  Hx of generalized hypermobility, can also play a role, reviewed strength/PT goals for this  XR images independently visualized and reviewed with patient today in clinic  MRI reviewed today, no significant cervical nerve/disc pathology, reassuring given acute on chronic and some weakness and significant abnormal sensation in the left UE  Consider EMG for further diagnostic clarity based on progress clinically  Oral prednisone was not significantly helpful  Potential for muscle  relaxer vs gabapentin also reviewed, gabapentin ordered today  Oral Tylenol and topical Voltaren gel reviewed as safe OTC options, reviewed safe dosing strategies  PT strongly encouraged, declined for now, available anytime, offered online resources as time/cost are considerations in the short term  Will contact with MRi results if obtained  Work note provided previously  Consider further referral in the future based on progress/diagnostics  Continue with weightloss, has done with diet/activity  Posture control reviewed in detail  We discussed modified progressive pain-free activity as tolerated  Supportive care reviewed  All questions were answered today  Contact us with additional questions or concerns  Signs and sx of concern reviewed      Axel Schafer DO, YOLANDA  Sports Medicine Physician  [facility]      Time spent in chart review, one-on-one evaluation, discussion with patient regarding: nature of problem, clinical course, prior treatments, therapeutic options, shared-decision making, potential procedures and referrals, and charting related to the visit: 34 minutes.  If applicable, time does not include time spent performing any procedure.          Disclaimer: This note consists of symbols derived from keyboarding, dictation and/or voice recognition software. As a result, there may be errors in the script that have gone undetected. Please consider this when interpreting information found in this chart.

## 2023-10-13 ENCOUNTER — OFFICE VISIT (OUTPATIENT)
Dept: ORTHOPEDICS | Facility: CLINIC | Age: 23
End: 2023-10-13
Payer: COMMERCIAL

## 2023-10-13 VITALS
DIASTOLIC BLOOD PRESSURE: 101 MMHG | HEART RATE: 92 BPM | WEIGHT: 275 LBS | BODY MASS INDEX: 45.76 KG/M2 | SYSTOLIC BLOOD PRESSURE: 155 MMHG

## 2023-10-13 DIAGNOSIS — M54.2 CERVICALGIA: ICD-10-CM

## 2023-10-13 DIAGNOSIS — Z87.39 HX OF CLOSED DISLOCATION OF SHOULDER: ICD-10-CM

## 2023-10-13 DIAGNOSIS — R20.2 NUMBNESS AND TINGLING IN LEFT HAND: ICD-10-CM

## 2023-10-13 DIAGNOSIS — M79.602 LEFT ARM PAIN: Primary | ICD-10-CM

## 2023-10-13 DIAGNOSIS — M25.512 CHRONIC LEFT SHOULDER PAIN: ICD-10-CM

## 2023-10-13 DIAGNOSIS — G54.0 THORACIC OUTLET SYNDROME OF LEFT THORACIC OUTLET: ICD-10-CM

## 2023-10-13 DIAGNOSIS — G89.29 CHRONIC LEFT SHOULDER PAIN: ICD-10-CM

## 2023-10-13 DIAGNOSIS — R20.0 NUMBNESS AND TINGLING IN LEFT HAND: ICD-10-CM

## 2023-10-13 PROCEDURE — 99214 OFFICE O/P EST MOD 30 MIN: CPT | Performed by: FAMILY MEDICINE

## 2023-10-13 RX ORDER — GABAPENTIN 300 MG/1
300 CAPSULE ORAL 3 TIMES DAILY PRN
Qty: 90 CAPSULE | Refills: 0 | Status: SHIPPED | OUTPATIENT
Start: 2023-10-13 | End: 2024-04-18

## 2023-10-13 NOTE — LETTER
10/13/2023         RE: Tana Hernandez  [address] [facility] [address]        Dear Colleague,    Thank you for referring your patient, Tana Hernandez, to the [facility]. Please see a copy of my visit note below.    Tana Hernandez  :  2000  DOS: 10/13/23  MRN: [id number]    Sports Medicine Clinic Visit    PCP: Gina Ferraro    Tana Hernandez is a 23 year old Right hand dominant female who is seen as a self referral presenting with Left arm pain/numbness/tingling.    Had seen Dr. Baeza 5/10/2022    Injury: Gradual onset of pain over the past 1-2 year(s).  Pain located over left shoulder, radiating to fingers (ring and long fingers primarily, pain in thumb and sometimes radiating to hypothenar).  Reports intermittent radiating, but notes that the last week it has been constant, numbness and tingling, pain and weakness to fine motor skills (drawing vaccines), notes that pain increases with sitting at the base of the skull.  Additional Features:  Positive: paresthesias, numbness, weakness and pain in other joints (notes hypermobility).  Symptoms are better with Tylenol, Ibuprofen and lidocaine patch.  Symptoms are worse with: Unsure, but notes that turning the head makes her dizzy, sitting makes it worse, noting pressure in head and neck.  Other evaluation and/or treatments so far consists of: Tylenol and Ibuprofen.  Recent imaging completed: No recent imaging completed.  Prior History of related problems: 5/10/2022    Social History: currently employed as Vet Technician    Interim History - 2023  Since last visit on 2023 patient states that her shoulder got better with restrictions and taking it easy, but more recently she is having more pain. Complains of her shoulder feeling cold and numbness and tingling in 3rd and 4th fingers, also having pain with neck rotation. Denies trying PT and currently taking ibuprofen and tylenol prn. Has  muscle relaxer's, ut refrains from using them as much as possible.   No interim injury.       Review of Systems  Musculoskeletal: as above  Remainder of review of systems is negative including constitutional, CV, pulmonary, GI, Skin and Neurologic except as noted in HPI or medical history.    Past Medical History:   Diagnosis Date     Anxiety      Depressive disorder      No past surgical history on file.  No family history on file.    Objective  BP (!) 155/101   Pulse 92   Wt 124.7 kg (275 lb)   BMI 45.76 kg/m      General: healthy, alert and in no distress    HEENT: no scleral icterus or conjunctival erythema   Skin: no suspicious lesions or rash. No jaundice.   CV: regular rhythm by palpation, 2+ distal pulses, no pedal edema    Resp: normal respiratory effort without conversational dyspnea   Psych: normal mood and affect    Gait: nonantalgic, appropriate coordination and balance   Neuro: normal light touch sensory exam of the extremities. Motor strength as noted below     Left Shoulder exam    ROM:        Mildly limited terminal active and passive ROM with flexion, extension, abduction, internal and external rotation.    Tender:        Mild generalized TTP about the upper and posterior shoulder musculature, active spasm and TART changes in the upper trapezius, levator scapula, posterior scalenes, supraspinatus, cervical paraspinals       Also tender subacromial space, lateral deltoid, anterior GH joint    Non Tender:       remainder of shoulder       sternoclavicular joint       acromioclavicular joint       posterior shoulder    Strength:        abduction 5-/5       internal rotation 5/5       external rotation 5-/5       adduction 5/5    Impingement testing:        neg (-) Neer       positive (+) Fonseca       Positive empty can       neg (-) crossover       positive crank    Stability testing:       neg (-) anterior glide       neg (-) sulcus sign    Skin:       no visible deformities       well perfused        capillary refill brisk    Sensation:        normal sensation over shoulder and upper extremity     Cervical spine Exam  Inspection: normal cervical lordosis  Tender:  medial border of scapula, superior angle of scapula, left paracervical muscles > right paracervical muscles, left trapezius muscles > right trapezius muscles  Non-tender:  spinous processes  Range of Motion:  Full ROM of cervical spine, pain with most movements L>R  Strength: Full strength of all neck muscles  Special tests:  Spurling's - left - equivocal, Spurling's - negative - right, equivocal left adson's      Radiology  Recent Results (from the past 744 hour(s))   XR Cervical Spine 2/3 vws    Narrative    CERVICAL SPINE TWO TO THREE VIEWS  7/7/2023 8:29 AM     HISTORY: Numbness and tingling in left hand.    COMPARISON: None.      Impression    IMPRESSION: No gross vertebral body height loss is identified. There  is slight reversal of the normal cervical lordosis. Alignment  otherwise appears within normal limits. The intervertebral disc spaces  appear relatively maintained in height. No advanced facet arthropathy  is identified. The prevertebral soft tissues appear normal in  thickness. Small metallic density structure projecting over the upper  mediastinal region on the frontal view only may be external to the  patient; recommend clinical correlation.     MRI CERVICAL SPINE WITHOUT CONTRAST 8/4/2023 11:06 AM      HISTORY: Left arm pain. Numbness and tingling in left hand.      TECHNIQUE: Multiplanar, multisequence MRI of the cervical spine  without contrast.      COMPARISON: Cervical spine x-ray 7/7/2023.      FINDINGS: Straightening of the normal cervical lordosis which may be  positional. No loss of vertebral body height. No focal destructive  bony lesions. No STIR hyperintense endplate edema (Modic type I  changes).     No abnormal signal appreciated within the visualized spinal cord.     Level by level as follows:      C2-C3: No loss of  disc height. No significant disc herniation. Normal  facets. No spinal canal or neural foraminal narrowing.      C3-C4: No loss of disc height. No significant disc herniation. Normal  facets. No spinal canal or neural foraminal narrowing.      C4-C5: No loss of disc height. No significant disc herniation. Normal  facets. No spinal canal or neural foraminal narrowing.      C5-C6: No loss of disc height. Shallow circumferential disc bulge.  Normal facets. No spinal canal or neural foraminal narrowing.      C6-C7: No loss of disc height. Shallow circumferential disc bulge.  Normal facets. No spinal canal or neural foraminal narrowing.      C7-T1: No loss of disc height. No significant disc herniation. Normal  facets. No spinal canal or neural foraminal narrowing.      Paraspinous soft tissues are unremarkable.                                                                       IMPRESSION:    1. Mild degenerative disc changes with shallow disc bulges at C5-C6  and C6-C7.  2. No spinal canal or neural foraminal narrowing at any level.       Assessment:  1. Left arm pain    2. Numbness and tingling in left hand    3. Thoracic outlet syndrome of left thoracic outlet    4. Chronic left shoulder pain    5. Hx of closed dislocation of shoulder    6. Cervicalgia          Plan:  Discussed the assessment with the patient.  Follow up: prn based on short term clinical progress  Ongoing but improving flare of chronic left sided neck and arm sx, complicated by multiple traumatic life events recently  Constellation of sx on hx and exam have components of cervical radiculopathy, neurogenic TOS, trigger points  Also wih more persistent should joint and RTC pain today  MRI ordered for better diagnostic clarity  Hx of generalized hypermobility, can also play a role, reviewed strength/PT goals for this  XR images independently visualized and reviewed with patient today in clinic  MRI reviewed today, no significant cervical nerve/disc  pathology, reassuring given acute on chronic and some weakness and significant abnormal sensation in the left UE  Consider EMG for further diagnostic clarity based on progress clinically  Oral prednisone was not significantly helpful  Potential for muscle relaxer vs gabapentin also reviewed, gabapentin ordered today  Oral Tylenol and topical Voltaren gel reviewed as safe OTC options, reviewed safe dosing strategies  PT strongly encouraged, declined for now, available anytime, offered online resources as time/cost are considerations in the short term  Will contact with MRi results if obtained  Work note provided previously  Consider further referral in the future based on progress/diagnostics  Continue with weightloss, has done with diet/activity  Posture control reviewed in detail  We discussed modified progressive pain-free activity as tolerated  Supportive care reviewed  All questions were answered today  Contact us with additional questions or concerns  Signs and sx of concern reviewed      Axel Schafer DO, YOLANDA  Sports Medicine Physician  [facility]      Time spent in chart review, one-on-one evaluation, discussion with patient regarding: nature of problem, clinical course, prior treatments, therapeutic options, shared-decision making, potential procedures and referrals, and charting related to the visit: 34 minutes.  If applicable, time does not include time spent performing any procedure.          Disclaimer: This note consists of symbols derived from keyboarding, dictation and/or voice recognition software. As a result, there may be errors in the script that have gone undetected. Please consider this when interpreting information found in this chart.      Again, thank you for allowing me to participate in the care of your patient.        Sincerely,        Axel Schafer DO

## 2023-10-13 NOTE — LETTER
October 13, 2023      Piedad was seen in my office today and is under my care.  She will have a lift/carry restriction of 45 pounds for the next 6 weeks.  Limit work above shoulder height where possible.  She should also strictly modify or eliminate any activity that increases her pain or feels unsafe.  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  She can otherwise work as tolerated.  Any absence from work over the next 6 weeks due to increased pain should be considered medically excused.  Updated recommendations will be provided as needed.       Axel Hendrickson DO, CAQ  Sports Medicine Physician  [facility]

## 2023-10-18 ENCOUNTER — MYC REFILL (OUTPATIENT)
Dept: FAMILY MEDICINE | Facility: CLINIC | Age: 23
End: 2023-10-18
Payer: COMMERCIAL

## 2023-10-18 DIAGNOSIS — F90.9 ATTENTION DEFICIT HYPERACTIVITY DISORDER (ADHD), UNSPECIFIED ADHD TYPE: ICD-10-CM

## 2023-10-20 DIAGNOSIS — E11.65 TYPE 2 DIABETES MELLITUS WITH HYPERGLYCEMIA, WITHOUT LONG-TERM CURRENT USE OF INSULIN (H): ICD-10-CM

## 2023-10-20 RX ORDER — DEXTROAMPHETAMINE SACCHARATE, AMPHETAMINE ASPARTATE MONOHYDRATE, DEXTROAMPHETAMINE SULFATE, AMPHETAMINE SULFATE 12.5; 12.5; 12.5; 12.5 MG/1; MG/1; MG/1; MG/1
50 CAPSULE, EXTENDED RELEASE ORAL DAILY PRN
Qty: 30 CAPSULE | Refills: 0 | Status: SHIPPED | OUTPATIENT
Start: 2023-10-20 | End: 2023-11-27

## 2023-10-23 RX ORDER — METFORMIN HCL 500 MG
1000 TABLET, EXTENDED RELEASE 24 HR ORAL 2 TIMES DAILY WITH MEALS
Qty: 180 TABLET | Refills: 1 | Status: SHIPPED | OUTPATIENT
Start: 2023-10-23 | End: 2024-06-25

## 2023-11-06 ENCOUNTER — DOCUMENTATION ONLY (OUTPATIENT)
Dept: FAMILY MEDICINE | Facility: CLINIC | Age: 23
End: 2023-11-06
Payer: COMMERCIAL

## 2023-11-06 NOTE — PROGRESS NOTES
Please abstract the following data from this visit with this patient into the appropriate field in Epic:    Tests that can be patient reported without a hard copy:    Other Tests found in the patient's chart through Chart Review/Care Everywhere:    A1c done by Health system on this date: 6/8/23    Note to [employer]: If this section is blank, no results were found via Chart Review/Care Everywhere.    Gold Ha, KelinD, BCACP  Medication Therapy Management Pharmacist  Pager: [phone]

## 2023-11-27 ENCOUNTER — MYC REFILL (OUTPATIENT)
Dept: FAMILY MEDICINE | Facility: CLINIC | Age: 23
End: 2023-11-27
Payer: COMMERCIAL

## 2023-11-27 DIAGNOSIS — F90.9 ATTENTION DEFICIT HYPERACTIVITY DISORDER (ADHD), UNSPECIFIED ADHD TYPE: ICD-10-CM

## 2023-11-28 RX ORDER — DEXTROAMPHETAMINE SACCHARATE, AMPHETAMINE ASPARTATE MONOHYDRATE, DEXTROAMPHETAMINE SULFATE, AMPHETAMINE SULFATE 12.5; 12.5; 12.5; 12.5 MG/1; MG/1; MG/1; MG/1
50 CAPSULE, EXTENDED RELEASE ORAL DAILY PRN
Qty: 30 CAPSULE | Refills: 0 | Status: SHIPPED | OUTPATIENT
Start: 2023-11-28 | End: 2024-01-04

## 2023-12-01 ENCOUNTER — OFFICE VISIT (OUTPATIENT)
Dept: INTERNAL MEDICINE | Facility: CLINIC | Age: 23
End: 2023-12-01
Payer: COMMERCIAL

## 2023-12-01 VITALS
HEIGHT: 66 IN | OXYGEN SATURATION: 99 % | WEIGHT: 287 LBS | TEMPERATURE: 98.4 F | DIASTOLIC BLOOD PRESSURE: 104 MMHG | RESPIRATION RATE: 16 BRPM | HEART RATE: 76 BPM | SYSTOLIC BLOOD PRESSURE: 144 MMHG | BODY MASS INDEX: 46.12 KG/M2

## 2023-12-01 DIAGNOSIS — E11.9 TYPE 2 DIABETES MELLITUS WITHOUT COMPLICATION, WITHOUT LONG-TERM CURRENT USE OF INSULIN (H): ICD-10-CM

## 2023-12-01 DIAGNOSIS — F39 MOOD DISORDER (H): ICD-10-CM

## 2023-12-01 DIAGNOSIS — F31.81 BIPOLAR 2 DISORDER (H): ICD-10-CM

## 2023-12-01 DIAGNOSIS — Z11.3 SCREENING FOR STDS (SEXUALLY TRANSMITTED DISEASES): ICD-10-CM

## 2023-12-01 DIAGNOSIS — Z00.00 ENCOUNTER FOR ROUTINE ADULT HEALTH EXAMINATION WITHOUT ABNORMAL FINDINGS: Primary | ICD-10-CM

## 2023-12-01 DIAGNOSIS — R31.1 BENIGN ESSENTIAL MICROSCOPIC HEMATURIA: ICD-10-CM

## 2023-12-01 DIAGNOSIS — R09.81 NASAL CONGESTION: ICD-10-CM

## 2023-12-01 DIAGNOSIS — R03.0 ELEVATED BLOOD PRESSURE READING WITHOUT DIAGNOSIS OF HYPERTENSION: ICD-10-CM

## 2023-12-01 DIAGNOSIS — F60.3 BORDERLINE PERSONALITY DISORDER (H): ICD-10-CM

## 2023-12-01 LAB
ALBUMIN UR-MCNC: NEGATIVE MG/DL
APPEARANCE UR: CLEAR
BILIRUB UR QL STRIP: NEGATIVE
C TRACH DNA SPEC QL PROBE+SIG AMP: NEGATIVE
CHOLEST SERPL-MCNC: 162 MG/DL
COLOR UR AUTO: YELLOW
CREAT UR-MCNC: 93.8 MG/DL
GLUCOSE UR STRIP-MCNC: NEGATIVE MG/DL
HBA1C MFR BLD: 6.4 %
HDLC SERPL-MCNC: 56 MG/DL
HGB UR QL STRIP: NEGATIVE
KETONES UR STRIP-MCNC: NEGATIVE MG/DL
LDLC SERPL CALC-MCNC: 79 MG/DL
LEUKOCYTE ESTERASE UR QL STRIP: NEGATIVE
MICROALBUMIN UR-MCNC: <12 MG/L
MICROALBUMIN/CREAT UR: NORMAL MG/G{CREAT}
N GONORRHOEA DNA SPEC QL NAA+PROBE: NEGATIVE
NITRATE UR QL: NEGATIVE
NONHDLC SERPL-MCNC: 106 MG/DL
PH UR STRIP: 6 [PH] (ref 5–7)
SP GR UR STRIP: 1.01 (ref 1–1.03)
TRIGL SERPL-MCNC: 136 MG/DL
UROBILINOGEN UR STRIP-MCNC: NORMAL MG/DL

## 2023-12-01 PROCEDURE — 82570 ASSAY OF URINE CREATININE: CPT | Performed by: INTERNAL MEDICINE

## 2023-12-01 PROCEDURE — 81003 URINALYSIS AUTO W/O SCOPE: CPT | Performed by: INTERNAL MEDICINE

## 2023-12-01 PROCEDURE — 91320 SARSCV2 VAC 30MCG TRS-SUC IM: CPT | Performed by: INTERNAL MEDICINE

## 2023-12-01 PROCEDURE — 99385 PREV VISIT NEW AGE 18-39: CPT | Mod: 25 | Performed by: INTERNAL MEDICINE

## 2023-12-01 PROCEDURE — 99214 OFFICE O/P EST MOD 30 MIN: CPT | Mod: 25 | Performed by: INTERNAL MEDICINE

## 2023-12-01 PROCEDURE — 80061 LIPID PANEL: CPT | Performed by: INTERNAL MEDICINE

## 2023-12-01 PROCEDURE — 90480 ADMN SARSCOV2 VAC 1/ONLY CMP: CPT | Performed by: INTERNAL MEDICINE

## 2023-12-01 PROCEDURE — 87591 N.GONORRHOEAE DNA AMP PROB: CPT | Performed by: INTERNAL MEDICINE

## 2023-12-01 PROCEDURE — 90686 IIV4 VACC NO PRSV 0.5 ML IM: CPT | Performed by: INTERNAL MEDICINE

## 2023-12-01 PROCEDURE — 90471 IMMUNIZATION ADMIN: CPT | Performed by: INTERNAL MEDICINE

## 2023-12-01 PROCEDURE — 83036 HEMOGLOBIN GLYCOSYLATED A1C: CPT | Performed by: INTERNAL MEDICINE

## 2023-12-01 PROCEDURE — 36415 COLL VENOUS BLD VENIPUNCTURE: CPT | Performed by: INTERNAL MEDICINE

## 2023-12-01 PROCEDURE — 87491 CHLMYD TRACH DNA AMP PROBE: CPT | Performed by: INTERNAL MEDICINE

## 2023-12-01 PROCEDURE — 82043 UR ALBUMIN QUANTITATIVE: CPT | Performed by: INTERNAL MEDICINE

## 2023-12-01 RX ORDER — FLUOXETINE 40 MG/1
40 CAPSULE ORAL DAILY
Qty: 90 CAPSULE | Refills: 0 | Status: SHIPPED | OUTPATIENT
Start: 2023-12-01 | End: 2024-06-24

## 2023-12-01 RX ORDER — LAMOTRIGINE 100 MG/1
100 TABLET ORAL DAILY
Qty: 90 TABLET | Refills: 0 | Status: SHIPPED | OUTPATIENT
Start: 2023-12-01 | End: 2024-06-25

## 2023-12-01 RX ORDER — FLUTICASONE PROPIONATE 50 MCG
1 SPRAY, SUSPENSION (ML) NASAL DAILY
Qty: 16 G | Refills: 3 | Status: SHIPPED | OUTPATIENT
Start: 2023-12-01

## 2023-12-01 ASSESSMENT — ENCOUNTER SYMPTOMS
NERVOUS/ANXIOUS: 0
JOINT SWELLING: 1
MYALGIAS: 1
WEAKNESS: 0
FEVER: 0
NAUSEA: 1
DIZZINESS: 0
SHORTNESS OF BREATH: 0
EYE PAIN: 0
HEADACHES: 0
ARTHRALGIAS: 1
DYSURIA: 0
SORE THROAT: 0
HEARTBURN: 0
PARESTHESIAS: 0
HEMATOCHEZIA: 0
FREQUENCY: 0
ABDOMINAL PAIN: 0
CONSTIPATION: 1
DIARRHEA: 0
HEMATURIA: 0
BREAST MASS: 0
PALPITATIONS: 0
COUGH: 0
CHILLS: 0

## 2023-12-01 NOTE — COMMUNITY RESOURCES LIST (ENGLISH)
12/01/2023   [facility]  N/A  For questions about this resource list or additional care needs, please contact your primary care clinic or care manager.  Phone: [phone]   Email: N/A   Address: [address] [place] [address]   Hours: N/A        Financial Stability       Rent and mortgage payment assistance  1  [facility] ([facility]) - Emergency Assistance - Rent and mortgage payment assistance Distance: 13.9 miles      In-Person   [address] [place] [address]  Language: English, Greenlandic  Hours: Mon 10:00 AM - 1:30 PM Appt. Only, Wed 10:00 AM - 1:30 PM Appt. Only, Thu 4:30 PM - 6:30 PM Appt. Only, Fri 10:00 AM - 1:30 PM Appt. Only  Fees: Free   Phone: (973) 582-8674 Email: info@[employer] Website: http://www.caLegalZoom."[employer]     2  [facility] - Emergency Assistance - Rent and mortgage payment assistance Distance: 15.11 miles      In-Person   [address] [place] [address]  Language: English  Hours: Mon - Fri 8:00 AM - 4:30 PM  Fees: Free   Phone: (848) 825-4908 Email: [facility]. Website: [email or web address]://www.[email or web address]./[facility]/index.php          Transportation       Free or low-cost transportation  3  [employer] Distance: 15.06 miles      In-Person   [address] [place] [address]  Language: English  Hours: Mon - Fri 6:00 AM - 6:00 PM  Fees: Insurance, Self Pay   Phone: (474) 365-2631 Email: info@[employer] Website: http://www.CreoPop     Transportation to medical appointments  4  [facility] [employer] Distance: 15.06 miles      In-Person   [address] [place] [address]  Language: English  Hours: Mon - Fri 6:00 AM - 6:00 PM  Fees: Insurance, Self Pay   Phone: (723) 591-8415 Email: info@[employer] Website: http://www.bContext.Foodfly     5  [employer] Distance: 19.98 miles       In-Person   [address] [place] [address]  Language: English  Hours: Mon - Fri 4:00 AM - 6:00 PM  Fees: Insurance, Self Pay   Phone: (170) 629-1204 Email: zehra@[employer].CompareMyFare          Important Numbers & Websites       Emergency Services   911  [facility] Services   311  Poison Control   (273) 562-4231  Suicide Prevention Lifeline   (947) 349-5028 (TALK)  Child Abuse Hotline   (126) 324-3055 (4-A-Child)  Sexual Assault Hotline   (332) 938-6516 (HOPE)  National Runaway Safeline   (855) 962-7152 (RUNAWAY)  All-Options Talkline   (609) 394-2739  Substance Abuse Referral   (604) 138-9256 (HELP)

## 2023-12-01 NOTE — PROGRESS NOTES
SUBJECTIVE:   Piedad is a 23 year old, presenting for the following:  Physical        12/1/2023     8:42 AM   Additional Questions   Roomed by Deena Reyes       Healthy Habits:     Getting at least 3 servings of Calcium per day:  NO    Bi-annual eye exam:  NO    Dental care twice a year:  NO    Sleep apnea or symptoms of sleep apnea:  Excessive snoring    Diet:  Vegetarian/vegan    Frequency of exercise:  1 day/week    Duration of exercise:  15-30 minutes    Taking medications regularly:  Yes    Medication side effects:  Not applicable    Additional concerns today:  No    Single, lives with mother in town. Works as a     Needs to get her prescriptions and preventative care.     Had flank pain, no UTI, ct scan was ok, no stones.     Diabetes and is ok on metformin, misses her doses some.     ADHD meds out of stock.  Knows she needs that daily. Got refill on Nov 28th.       Mood is ok, DBT at [place], 2 hours of group and 1 individual.  Difficult year with stress, father passed away. Treated with prozac and lamictal helps her borderline personality and bipolar 2.     Gabapentin for chronic pain in shoulder, low back pain. Not taking it consistently.       Today's PHQ-9 Score:       12/1/2023     8:36 AM   PHQ-9 SCORE   PHQ-9 Total Score MyChart 8 (Mild depression)   PHQ-9 Total Score 8                       Social History     Tobacco Use    Smoking status: Never    Smokeless tobacco: Never   Substance Use Topics    Alcohol use: No     Alcohol/week: 0.0 standard drinks of alcohol             12/1/2023     8:38 AM   Alcohol Use   Prescreen: >3 drinks/day or >7 drinks/week? No     Reviewed orders with patient.  Reviewed health maintenance and updated orders accordingly - Yes  Lab work is in process    Breast Cancer Screening:        History of abnormal Pap smear: no but will do pap with her primary, scheduled       6/28/2020     9:57 AM   PAP / HPV   PAP-ABSTRACT See Scanned Document           This result  "is from an external source.     Reviewed and updated as needed this visit by clinical staff   Tobacco  Allergies  Meds              Reviewed and updated as needed this visit by Provider                     Review of Systems   Constitutional:  Negative for chills and fever.   HENT:  Positive for ear pain. Negative for congestion, hearing loss and sore throat.    Eyes:  Negative for pain and visual disturbance.   Respiratory:  Negative for cough and shortness of breath.    Cardiovascular:  Positive for peripheral edema. Negative for chest pain and palpitations.   Gastrointestinal:  Positive for constipation and nausea. Negative for abdominal pain, diarrhea, heartburn and hematochezia.   Breasts:  Negative for tenderness, breast mass and discharge.   Genitourinary:  Negative for dysuria, frequency, genital sores, hematuria, pelvic pain, urgency, vaginal bleeding and vaginal discharge.   Musculoskeletal:  Positive for arthralgias, joint swelling and myalgias.   Skin:  Negative for rash.   Neurological:  Negative for dizziness, weakness, headaches and paresthesias.   Psychiatric/Behavioral:  Negative for mood changes. The patient is not nervous/anxious.           OBJECTIVE:   BP (!) 144/104   Pulse 76   Temp 98.4  F (36.9  C) (Temporal)   Resp 16   Ht 1.664 m (5' 5.5\")   Wt 130.2 kg (287 lb)   LMP 11/13/2023   SpO2 99%   BMI 47.03 kg/m    Physical Exam  GENERAL: healthy, alert and no distress  EYES: Eyes grossly normal to inspection, PERRL and conjunctivae and sclerae normal  HENT: ear canals and TM's normal, nose and mouth without ulcers or lesions  NECK: no adenopathy, no asymmetry, masses, or scars and thyroid normal to palpation  RESP: lungs clear to auscultation - no rales, rhonchi or wheezes  CV: regular rate and rhythm, normal S1 S2, no S3 or S4, no murmur, click or rub, no peripheral edema and peripheral pulses strong  MS: no gross musculoskeletal defects noted, no edema  SKIN: no suspicious lesions or " rashes  NEURO: Normal strength and tone, mentation intact and speech normal  PSYCH: mentation appears normal, affect normal/bright        ASSESSMENT/PLAN:       ICD-10-CM    1. Encounter for routine adult health examination without abnormal findings  Z00.00       2. Type 2 diabetes mellitus without complication, without long-term current use of insulin (H)  E11.9 Lipid panel reflex to direct LDL Fasting     Albumin Random Urine Quantitative with Creat Ratio     HEMOGLOBIN A1C     HEMOGLOBIN A1C     Albumin Random Urine Quantitative with Creat Ratio     Lipid panel reflex to direct LDL Fasting      3. Screening for STDs (sexually transmitted diseases)  Z11.3 Chlamydia trachomatis/Neisseria gonorrhoeae by PCR- VAGINAL SELF-SWAB     Chlamydia trachomatis/Neisseria gonorrhoeae by PCR- VAGINAL SELF-SWAB      4. Benign essential microscopic hematuria  R31.1 UA Macroscopic with reflex to Microscopic and Culture - Lab Collect     UA Macroscopic with reflex to Microscopic and Culture - Lab Collect      5. Borderline personality disorder (H)  F60.3 lamoTRIgine (LAMICTAL) 100 MG tablet      6. Bipolar 2 disorder (H)  F31.81       7. Nasal congestion  R09.81 fluticasone (FLONASE) 50 MCG/ACT nasal spray      8. Mood disorder (H24)  F39 FLUoxetine (PROZAC) 40 MG capsule     lamoTRIgine (LAMICTAL) 100 MG tablet      9. Elevated blood pressure reading without diagnosis of hypertension  R03.0         Patient here for yearly physical and to get her medications refilled.  She will do her Pap with her primary we have scheduled her a follow-up appointment with.    She just had some flank pain was seen in an ER had a negative CT scan but had some hematuria on her urinalysis we will repeat her urinalysis to make sure that is gone.  It was approximately 6 days after her menstrual period started so could have been menstrual bleeding.    Type 2 diabetes on metformin weight is continuing to go up BMI is 47 we discussed weight loss, she says  "she has had a very stressful year with her father passing away.  She will try to work on weight loss in the future.    She like to be screened for gonorrhea and chlamydia not other STDs she will do a self swab today.    Borderline personality disorder bipolar 2 she was diagnosed with psychiatry she is on fluoxetine for years and Lamictal recently started to help her more we will refill those medications for her.  She will continue with her counseling doing group 2 hours a week and individual 1 hour a week.    Elevated blood pressure on multiple readings I told her she has hypertension she was resistant to this idea and did not want medications she will try to cut back her caffeine work on exercise and weight loss and she thinks with decreased stressed that her blood pressure will get better.    Nasal congestion she uses over-the-counter Flonase and will continue this    Patient is a quite a bit of chronic pain in her shoulder and other joints.  She does have some gabapentin listed but I recommend she try to get off that this because she is only taking it as needed that may not be as effective.  Plan exercise and weight loss to help her joints would be the best.    ADHD she is on stimulant medication 50 mg extended release which she has been on for years.  That has been filled by her primary.  She appears to be appropriate in this use however has to be monitored with her blood pressure being elevated.            Patient has been advised of split billing requirements and indicates understanding: Yes      COUNSELING:  Reviewed preventive health counseling, as reflected in patient instructions       Regular exercise       Healthy diet/nutrition      BMI:   Estimated body mass index is 47.03 kg/m  as calculated from the following:    Height as of this encounter: 1.664 m (5' 5.5\").    Weight as of this encounter: 130.2 kg (287 lb).   Weight management plan: Discussed healthy diet and exercise guidelines      She reports " that she has never smoked. She has never used smokeless tobacco.          London Webber MD  [facility]

## 2023-12-01 NOTE — COMMUNITY RESOURCES LIST (ENGLISH)
12/01/2023   [facility] - Outpatient Clinics  N/A  For additional resource needs, please contact your health insurance member services or your primary care team.  Phone: [phone]   Email: N/A   Address: [address] [place] [address]   Hours: N/A        Financial Stability       Rent and mortgage payment assistance  1  [facility] ([facility]) - Emergency Assistance - Rent and mortgage payment assistance Distance: 13.9 miles      In-Person   [address] [place] [address]  Language: English, Luxembourgish  Hours: Mon 10:00 AM - 1:30 PM Appt. Only, Wed 10:00 AM - 1:30 PM Appt. Only, Thu 4:30 PM - 6:30 PM Appt. Only, Fri 10:00 AM - 1:30 PM Appt. Only  Fees: Free   Phone: (989) 782-3073 Email: info@[employer] Website: http://www.caOrca Pharmaceuticals.org     2  [facility] - Emergency Assistance - Rent and mortgage payment assistance Distance: 15.11 miles      In-Person   [address] [place] [address]  Language: English  Hours: Mon - Fri 8:00 AM - 4:30 PM  Fees: Free   Phone: (639) 261-5747 Email: [facility]. Website: http://www.[facility]./[facility]/index.php          Transportation       Free or low-cost transportation  3  [employer] Distance: 15.06 miles      In-Person   [address] [place] [address]  Language: English  Hours: Mon - Fri 6:00 AM - 6:00 PM  Fees: Insurance, Self Pay   Phone: (912) 392-4605 Email: info@[employer] Website: http://www.[employer]     Transportation to medical appointments  4  [employer] Distance: 15.06 miles      In-Person   [address] [place] [address]  Language: English  Hours: Mon - Fri 6:00 AM - 6:00 PM  Fees: Insurance, Self Pay   Phone: (194) 672-4966 Email: info@[employer] Website: http://www.[employer].Advanced Telemetry     5  [employer] Distance: 19.98 miles      In-Person    [address] [place] [address]  Language: English  Hours: Mon - Fri 4:00 AM - 6:00 PM  Fees: Insurance, Self Pay   Phone: (459) 724-5581 Email: [email or web address]@[email or web address].Edgeio          Important Numbers & Websites       [place] [address] 211itedway.org  Poison Control   (130) 253-1990 [place]po[place]  Suicide and Crisis Lifeline   [address] [place], [place] [address]line.org  [facility] [place] Child Abuse Hotline   922.814.4821 Childhelphotline.org  [place] Sexual Assault Hotline   (373) 127-5333 (HOPE) [facility] Runaway Safeline   (296) 830-9267 (RUNAWAY) [facility] Hospital[facility].org  Pregnancy & Postpartum Support [place]   Call/text 942-736-3835 Ppsupport[place].org  Substance Abuse National Helpline ([facility]   922-665-HELP (2067) Findtreatment.gov  Emergency Services   910

## 2024-01-04 ENCOUNTER — MYC REFILL (OUTPATIENT)
Dept: FAMILY MEDICINE | Facility: CLINIC | Age: 24
End: 2024-01-04
Payer: COMMERCIAL

## 2024-01-04 DIAGNOSIS — F90.9 ATTENTION DEFICIT HYPERACTIVITY DISORDER (ADHD), UNSPECIFIED ADHD TYPE: ICD-10-CM

## 2024-01-05 RX ORDER — DEXTROAMPHETAMINE SACCHARATE, AMPHETAMINE ASPARTATE MONOHYDRATE, DEXTROAMPHETAMINE SULFATE, AMPHETAMINE SULFATE 12.5; 12.5; 12.5; 12.5 MG/1; MG/1; MG/1; MG/1
50 CAPSULE, EXTENDED RELEASE ORAL DAILY PRN
Qty: 30 CAPSULE | Refills: 0 | Status: SHIPPED | OUTPATIENT
Start: 2024-01-05 | End: 2024-01-08

## 2024-01-06 ENCOUNTER — MYC MEDICAL ADVICE (OUTPATIENT)
Dept: FAMILY MEDICINE | Facility: CLINIC | Age: 24
End: 2024-01-06
Payer: COMMERCIAL

## 2024-01-06 DIAGNOSIS — F90.9 ATTENTION DEFICIT HYPERACTIVITY DISORDER (ADHD), UNSPECIFIED ADHD TYPE: ICD-10-CM

## 2024-01-08 DIAGNOSIS — F90.9 ATTENTION DEFICIT HYPERACTIVITY DISORDER (ADHD), UNSPECIFIED ADHD TYPE: ICD-10-CM

## 2024-01-08 RX ORDER — DEXTROAMPHETAMINE SACCHARATE, AMPHETAMINE ASPARTATE MONOHYDRATE, DEXTROAMPHETAMINE SULFATE, AMPHETAMINE SULFATE 12.5; 12.5; 12.5; 12.5 MG/1; MG/1; MG/1; MG/1
50 CAPSULE, EXTENDED RELEASE ORAL DAILY PRN
Qty: 30 CAPSULE | Refills: 0 | Status: CANCELLED | OUTPATIENT
Start: 2024-01-08

## 2024-01-08 RX ORDER — DEXTROAMPHETAMINE SACCHARATE, AMPHETAMINE ASPARTATE MONOHYDRATE, DEXTROAMPHETAMINE SULFATE, AMPHETAMINE SULFATE 12.5; 12.5; 12.5; 12.5 MG/1; MG/1; MG/1; MG/1
50 CAPSULE, EXTENDED RELEASE ORAL DAILY PRN
Qty: 30 CAPSULE | Refills: 0 | Status: SHIPPED | OUTPATIENT
Start: 2024-01-08 | End: 2024-02-13

## 2024-02-13 ENCOUNTER — MYC REFILL (OUTPATIENT)
Dept: FAMILY MEDICINE | Facility: CLINIC | Age: 24
End: 2024-02-13
Payer: COMMERCIAL

## 2024-02-13 DIAGNOSIS — F90.9 ATTENTION DEFICIT HYPERACTIVITY DISORDER (ADHD), UNSPECIFIED ADHD TYPE: ICD-10-CM

## 2024-02-13 RX ORDER — DEXTROAMPHETAMINE SACCHARATE, AMPHETAMINE ASPARTATE MONOHYDRATE, DEXTROAMPHETAMINE SULFATE, AMPHETAMINE SULFATE 12.5; 12.5; 12.5; 12.5 MG/1; MG/1; MG/1; MG/1
50 CAPSULE, EXTENDED RELEASE ORAL DAILY PRN
Qty: 30 CAPSULE | Refills: 0 | Status: SHIPPED | OUTPATIENT
Start: 2024-02-13 | End: 2024-03-17

## 2024-03-17 ENCOUNTER — MYC REFILL (OUTPATIENT)
Dept: FAMILY MEDICINE | Facility: CLINIC | Age: 24
End: 2024-03-17
Payer: COMMERCIAL

## 2024-03-17 DIAGNOSIS — F90.9 ATTENTION DEFICIT HYPERACTIVITY DISORDER (ADHD), UNSPECIFIED ADHD TYPE: ICD-10-CM

## 2024-03-18 RX ORDER — DEXTROAMPHETAMINE SACCHARATE, AMPHETAMINE ASPARTATE MONOHYDRATE, DEXTROAMPHETAMINE SULFATE, AMPHETAMINE SULFATE 12.5; 12.5; 12.5; 12.5 MG/1; MG/1; MG/1; MG/1
50 CAPSULE, EXTENDED RELEASE ORAL DAILY PRN
Qty: 30 CAPSULE | Refills: 0 | Status: SHIPPED | OUTPATIENT
Start: 2024-03-18 | End: 2024-03-21

## 2024-03-21 ENCOUNTER — MYC REFILL (OUTPATIENT)
Dept: FAMILY MEDICINE | Facility: CLINIC | Age: 24
End: 2024-03-21
Payer: COMMERCIAL

## 2024-03-21 DIAGNOSIS — F90.9 ATTENTION DEFICIT HYPERACTIVITY DISORDER (ADHD), UNSPECIFIED ADHD TYPE: ICD-10-CM

## 2024-03-21 RX ORDER — DEXTROAMPHETAMINE SACCHARATE, AMPHETAMINE ASPARTATE MONOHYDRATE, DEXTROAMPHETAMINE SULFATE, AMPHETAMINE SULFATE 12.5; 12.5; 12.5; 12.5 MG/1; MG/1; MG/1; MG/1
50 CAPSULE, EXTENDED RELEASE ORAL DAILY PRN
Qty: 30 CAPSULE | Refills: 0 | Status: SHIPPED | OUTPATIENT
Start: 2024-03-21 | End: 2024-04-22

## 2024-04-12 LAB — RETINOPATHY: NEGATIVE

## 2024-04-18 ENCOUNTER — OFFICE VISIT (OUTPATIENT)
Dept: PHYSICAL MEDICINE AND REHAB | Facility: CLINIC | Age: 24
End: 2024-04-18
Payer: COMMERCIAL

## 2024-04-18 VITALS
HEIGHT: 66 IN | HEART RATE: 68 BPM | WEIGHT: 277 LBS | DIASTOLIC BLOOD PRESSURE: 85 MMHG | SYSTOLIC BLOOD PRESSURE: 136 MMHG | BODY MASS INDEX: 44.52 KG/M2

## 2024-04-18 DIAGNOSIS — M54.50 ACUTE BILATERAL LOW BACK PAIN WITHOUT SCIATICA: Primary | ICD-10-CM

## 2024-04-18 PROCEDURE — 99204 OFFICE O/P NEW MOD 45 MIN: CPT | Performed by: NURSE PRACTITIONER

## 2024-04-18 RX ORDER — IBUPROFEN 800 MG/1
800 TABLET, FILM COATED ORAL EVERY 8 HOURS PRN
Qty: 60 TABLET | Refills: 0 | Status: SHIPPED | OUTPATIENT
Start: 2024-04-18

## 2024-04-18 ASSESSMENT — PAIN SCALES - GENERAL: PAINLEVEL: EXTREME PAIN (8)

## 2024-04-18 NOTE — PROGRESS NOTES
ASSESSMENT: Tana Hernandez is a 23 year old female who presents for consultation at the request of PCP Gina Ferraro, who presents today for new patient evaluation of:    -low back pain    Patient has sensory loss Left L5 distribution on exam. No myelopathic or red flag symptoms. 1 day hx of pain which has so far responded temporarily to otc ibuprofen would recommend rx ibuprofen 800 and tizanidine 4mg and starting PT. I expect symptoms should slowly improve. We reviewed her prior lumbar MRI which shows mild degeneration of L5-S1 disc with rightward disc bulge causing mild right foraminal narrowing. Possible T11-12 small left central disc protrusion. It has been 5 years since this MRI, I would expect findings have changed. We reviewed red flag s/s for which to call us if occur. If numbness worsens, weakness develops would recommend lumbar MRI without contrast as next step. She will otherwise follow up with me in approx 10 wks after full course of PT. Temporary restrictions for work given as she is a           7/7/2022     9:00 AM   OSWESTRY DISABILITY INDEX   Count 10   Sum 12   Oswestry Score (%) 24 %            Diagnoses and all orders for this visit:  Acute bilateral low back pain without sciatica  -     ibuprofen (ADVIL/MOTRIN) 800 MG tablet; Take 1 tablet (800 mg) by mouth every 8 hours as needed for inflammatory pain  -     Physical Therapy  Referral; Future  -     tiZANidine (ZANAFLEX) 4 MG tablet; Take 1 tablet (4 mg) by mouth 3 times daily as needed for muscle spasms      PLAN:  Reviewed spine anatomy and disease process. Discussed diagnosis and treatment options with the patient today. A shared decision making model was used.  The patient's values and choices were respected. The following represents what was discussed and decided upon by the provider and the patient.      -DIAGNOSTIC TESTS:  Images were personally reviewed and interpreted and explained to patient today using a spine  model.   Did not order any imaging    -PHYSICAL THERAPY:    --Referral to PT placed  Discussed the importance of core strengthening, ROM, stretching exercises with the patient and how each of these entities is important in decreasing pain.  Explained to the patient that the purpose of physical therapy is to teach the patient a home exercise program.  These exercises need to be performed every day in order to decrease pain and prevent future occurrences of pain.        -MEDICATIONS:    --ibuprofen 800mg TID PRN  -tizanidine 4mg TID PRN    Discussed multiple medication options today with patient. Discussed risks, side effects, and proper use of medications. Patient verbalized understanding.    -INTERVENTIONS:    -Discussed the role of injections with patient today. Patient would be a good candidate in the future for either epidural steroid injections or medial branch blocks if indicated based on symptoms and supported by imaging results.    -PATIENT EDUCATION:  Total time of 40 minutes, on the day of service, spent with the patient, reviewing the chart, placing orders, and documenting.   -Today we also discussed the pros and cons of the current treatment plan.    -FOLLOW-UP: follow up after full course of PT    Advised patient to call the [facility] if symptoms worsen, new numbness or weakness develops in the legs, or if they develop new or worsening problems controlling bladder or bowel function.   ______________________________________________________________________    SUBJECTIVE:    HPI:  Tana Hernandez  Is a 23 year old female hx type 2 diabetes, bipolar 2 disorder, borderline personality disorder, anxiety, depression, hypermobile joints who presents today for new patient evaluation of back pain    Pain started suddenly yesterday when she leaned forward to give vaccination to a dog. After that, she felt stiff and couldn't move. She went home from work. The pain radiates into both sides of the lower back. She  has some abdominal cramping as well - did start period the same day. She took some ibuprofen 2 tabs twice since it happened, which helped the cramping but still has pain in the back and sides. Getting up, standing, anything involving stabilization of the core makes her pain worse. Pain is 8/10 today, 9/10 at worst, 3/10 at best.    She had been doing gardening earlier in the week, raking etc and this flared up her left leg sciatica (down the side of the thigh and knee) and it gradually improved to a soreness. She thought perhaps it would be a good time to start PT. This was prior to injury yesterday. She does not really notice it that much now. She has noticed some weakness in the legs. She denies numbness or tingling in the legs or changes in bowel or bladder control.     She has a history of previous lower lumbar spine pain and had a lumbar MRI in 2019. She has tried flexeril and tizanidine in the past but not recently. Responded better to tizanidine    No history of injections or previous spine surgeries      -Treatment to Date:     -Medications:  ibuprofen    Current Outpatient Medications   Medication Sig Dispense Refill    ibuprofen (ADVIL/MOTRIN) 200 MG tablet       ibuprofen (ADVIL/MOTRIN) 800 MG tablet Take 1 tablet (800 mg) by mouth every 8 hours as needed for inflammatory pain 60 tablet 0    tiZANidine (ZANAFLEX) 4 MG tablet Take 1 tablet (4 mg) by mouth 3 times daily as needed for muscle spasms 45 tablet 0    ACE/ARB/ARNI NOT PRESCRIBED (INTENTIONAL) Please choose reason not prescribed from choices below.      Amphet-Dextroamphet 3-Bead ER 50 MG CP24 Take 50 mg by mouth daily as needed (ADHD) 30 capsule 0    ASPIRIN NOT PRESCRIBED (INTENTIONAL) Please choose reason not prescribed from choices below.      cetirizine (ZYRTEC) 10 MG tablet       famotidine (PEPCID) 40 MG tablet TAKE 1 TABLET(40 MG) BY MOUTH DAILY 90 tablet 1    FLUoxetine (PROZAC) 40 MG capsule Take 1 capsule (40 mg) by mouth daily 90  "capsule 0    fluticasone (FLONASE) 50 MCG/ACT nasal spray Spray 1 spray into both nostrils daily 16 g 3    lamoTRIgine (LAMICTAL) 100 MG tablet Take 1 tablet (100 mg) by mouth daily 90 tablet 0    metFORMIN (GLUCOPHAGE XR) 500 MG 24 hr tablet TAKE TWO TABLETS BY MOUTH TWICE A DAY WITH MEALS 180 tablet 1    STATIN NOT PRESCRIBED (INTENTIONAL) Please choose reason not prescribed from choices below.       No current facility-administered medications for this visit.       Allergies   Allergen Reactions    Wellbutrin [Bupropion] Hives       Past Medical History:   Diagnosis Date    Anxiety     Depressive disorder         Patient Active Problem List   Diagnosis    Major depression in complete remission (H)    Attention deficit hyperactivity disorder (ADHD), unspecified ADHD type    Class 3 severe obesity due to excess calories without serious comorbidity with body mass index (BMI) of 45.0 to 49.9 in adult (H)    Vitamin deficiency    Diabetes mellitus, type 2 (H)       No past surgical history on file.    No family history on file.    Reviewed past medical, surgical, and family history with patient found on new patient intake packet located in EMR Media tab.     SOCIAL HX: nonsmoker, special occasion alcohol use, no heavy drinking, cannabis use.    ROS: headache, joint pain, muscle pain, muscle fatigue, sciatica, excessive tiredness, anxiety.  Specifically negative for bowel/bladder dysfunction, balance changes, headache, dizziness, foot drop, fevers, chills, appetite changes, nausea/vomiting, unexplained weight loss. Otherwise 13 systems reviewed are negative. Please see the patient's intake questionnaire from today for details.    OBJECTIVE:  /85 (BP Location: Left arm, Patient Position: Sitting, Cuff Size: Adult Large)   Pulse 68   Ht 5' 5.5\" (1.664 m)   Wt 277 lb (125.6 kg)   BMI 45.39 kg/m      PHYSICAL EXAMINATION:    --CONSTITUTIONAL:  Vital signs as above.  No acute distress.  The patient is well " nourished and well groomed. Appears uncomfortable due to pain  --PSYCHIATRIC:  Appropriate mood and affect. The patient is awake, alert, oriented to person, place, time and answering questions appropriately with clear speech.    --SKIN:  Skin over the face, bilateral lower extremities, and posterior torso is clean, dry, intact without rashes.    --RESPIRATORY: Normal rhythm and effort. No abnormal accessory muscle breathing patterns noted.   --ABDOMINAL:  Non-distended.    --GROSS MOTOR: Gait is non-antalgic. Arises with pain from a seated position. Toe walking and heel walking are normal without significant difficulty.      --LOWER EXTREMITY MOTOR TESTING:  Hip flexion: right 5/5, left 5/5  Quads: right 5/5, left 5/5  Hamstrings: right 5/5, left 5/5  Dorsiflexion: right 5/5, left 5/5  Plantar flexion: right 5/5, left 5/5    Great toe MTP extension/EHL: right 5/5, left 5/5    --NEUROLOGICAL:  2/4 patellar and achilles reflexes bilaterally. Sensation to light touch is decreased in left L5 distribution, otherwise intact throughout both lower extremities. Babinski is negative. No clonus.    --MUSCULOSKELETAL: Lumbar spine inspection reveals no evidence of deformity. Range of motion is quite limited in lumbar flexion, extension, lateral rotation due to pain. Experiences worst pain in extension. Upper lumbar point tenderness to palpation. Positive bilateral lumbar paraspinal musculature tenderness.     Straight leg raising is negative    --SACROILIAC JOINT:   Negative SI joint tenderness to palpation bilaterally.    --VASCULAR:  Bilateral lower extremities are warm without any discoloration.  There is no pitting edema of the bilateral lower extremities.      RESULTS:   Prior medical records from [facility] and [facility]where were reviewed today.    Imaging: Spine imaging was personally reviewed and interpreted today. The images were shown to the patient and the findings were explained using a spine model.    MRI  LUMBAR SPINE WITHOUT CONTRAST   5/3/2019 1:47 PM      HISTORY: Two-three months of low back and bilateral leg pain.     COMPARISON: None.     TECHNIQUE: Multiplanar MR imaging was performed without contrast.     FINDINGS: Numbering of the levels is based on what appear to be five  lumbar type vertebral bodies. Vertebral body alignment is normal. No  fracture is seen. No pars interarticularis defect is demonstrated. No  osseous lesion is seen. No abnormal marrow signal intensity is  identified. The conus medullaris terminates at the level of the L1  vertebral body. No intrathecal abnormality is seen. The adjacent soft  tissues are unremarkable.     Findings by specific level:     T11-T12: This level is only minimally included on the sagittal images.  On the sagittal T2 series 3 image 8 there is suggestion of a small  left central disc protrusion, although the full extent cannot be  determined. No definite stenosis is seen.     T12-L1: The disc and facet joints are normal. No stenosis is seen.     L1-L2: The disc and facet joints are normal. No stenosis is seen.     L2-L3: The disc and facet joints are normal. No stenosis is seen.     L3-L4: The disc and facet joints are normal. No stenosis is seen.     L4-L5: The disc and facet joints are normal. No stenosis is seen.     L5-S1: The disc height is well-preserved. There is a small broad-based  right foraminal disc protrusion resulting in mild right foraminal  stenosis. No central canal or left foraminal narrowing is seen. The  facet joints are unremarkable.                                                                      IMPRESSION:   1. At L5-S1 there is a small right foraminal disc protrusion resulting  in mild right foraminal stenosis.  2. The T11-T12 level is only partially included on this study. There  appears to be a small left central disc protrusion, although the full  extent of this cannot be determined.     BRITNEY MCARTHUR MD      This note was  dictated using voice recognition software. Any grammatical or context distortions are unintentional and inherent to the software.       Tomasa Cunningham P-C  [facility]  O. [phone]

## 2024-04-18 NOTE — PATIENT INSTRUCTIONS
Prescribed ibuprofen 800mg today. Please take as prescribed, as needed for pain control as well as to aid in decreasing inflammation.   Take medication with a full glass of water and with food.   *Do not take Advil, Ibuprofen, Aleve, or Naproxen while taking this medication as it can cause organ failure if taken together*  This medication does have risks if taken long-term, these risks include: gastrointestinal irritation, kidney dysfunction, and cardiovascular effects.  We will check your kidney function as needed while you're on this medication.  Stop taking this medication if you have intolerable side effects or blood in the stool.     Tizanidine 4mg (muscle relaxant medication) has been prescribed today. Please take one 3x daily as needed for muscle spasms. This medication may cause drowsiness. Please do not work or drive while taking this medication until you know how it affects you. If it does make you drowsy, you should only take it before bedtime or at times that you do not have to work/drive.     ~You have been referred for Physical Therapy to [facility]. They will call you to schedule an appointment.      Scheduling phone number is [phone] for [facility], or [place] location.  If you have not heard from the scheduling office within 2 business days, please call [phone] for ALL other locations.    Discussed the importance of core strengthening, ROM, stretching exercises and how each of these entities is important in decreasing pain and improving long term spine health.  The purpose of physical therapy is to teach you an individualized home exercise program.  These exercises need to be performed every day in order to decrease pain and prevent future occurrences of pain.           ~Please call our [facility] Nurse Navigation line [phone] with any questions or concerns about your treatment plan, if symptoms worsen and you would like to  be seen urgently, or if you have any new or worsening numbness, weakness, or problems controlling bladder and bowel function.  ~You are also welcome to contact Tomasa Cunningham via [employer], but please be aware that responses to [employer] message may take 2-3 days due to the high volume of patients seen in clinic.

## 2024-04-18 NOTE — LETTER
4/18/2024         RE: Tana Hernandez  [address] [address] [facility] [address]        Dear Colleague,    Thank you for referring your patient, Tana Hernandez, to the [facility]. Please see a copy of my visit note below.    ASSESSMENT: Tana Hernandez is a 23 year old female who presents for consultation at the request of PCP Gina Ferraro, who presents today for new patient evaluation of:    -low back pain    Patient has sensory loss Left L5 distribution on exam. No myelopathic or red flag symptoms. 1 day hx of pain which has so far responded temporarily to otc ibuprofen would recommend rx ibuprofen 800 and tizanidine 4mg and starting PT. I expect symptoms should slowly improve. We reviewed her prior lumbar MRI which shows mild degeneration of L5-S1 disc with rightward disc bulge causing mild right foraminal narrowing. Possible T11-12 small left central disc protrusion. It has been 5 years since this MRI, I would expect findings have changed. We reviewed red flag s/s for which to call us if occur. If numbness worsens, weakness develops would recommend lumbar MRI without contrast as next step. She will otherwise follow up with me in approx 10 wks after full course of PT. Temporary restrictions for work given as she is a           7/7/2022     9:00 AM   OSWESTRY DISABILITY INDEX   Count 10   Sum 12   Oswestry Score (%) 24 %            Diagnoses and all orders for this visit:  Acute bilateral low back pain without sciatica  -     ibuprofen (ADVIL/MOTRIN) 800 MG tablet; Take 1 tablet (800 mg) by mouth every 8 hours as needed for inflammatory pain  -     Physical Therapy  Referral; Future  -     tiZANidine (ZANAFLEX) 4 MG tablet; Take 1 tablet (4 mg) by mouth 3 times daily as needed for muscle spasms      PLAN:  Reviewed spine anatomy and disease process. Discussed diagnosis and treatment options with the patient today. A shared decision making model was used.  The  patient's values and choices were respected. The following represents what was discussed and decided upon by the provider and the patient.      -DIAGNOSTIC TESTS:  Images were personally reviewed and interpreted and explained to patient today using a spine model.   Did not order any imaging    -PHYSICAL THERAPY:    --Referral to PT placed  Discussed the importance of core strengthening, ROM, stretching exercises with the patient and how each of these entities is important in decreasing pain.  Explained to the patient that the purpose of physical therapy is to teach the patient a home exercise program.  These exercises need to be performed every day in order to decrease pain and prevent future occurrences of pain.        -MEDICATIONS:    --ibuprofen 800mg TID PRN  -tizanidine 4mg TID PRN    Discussed multiple medication options today with patient. Discussed risks, side effects, and proper use of medications. Patient verbalized understanding.    -INTERVENTIONS:    -Discussed the role of injections with patient today. Patient would be a good candidate in the future for either epidural steroid injections or medial branch blocks if indicated based on symptoms and supported by imaging results.    -PATIENT EDUCATION:  Total time of 40 minutes, on the day of service, spent with the patient, reviewing the chart, placing orders, and documenting.   -Today we also discussed the pros and cons of the current treatment plan.    -FOLLOW-UP: follow up after full course of PT    Advised patient to call the [facility] if symptoms worsen, new numbness or weakness develops in the legs, or if they develop new or worsening problems controlling bladder or bowel function.   ______________________________________________________________________    SUBJECTIVE:    HPI:  Tana Hernandez  Is a 23 year old female hx type 2 diabetes, bipolar 2 disorder, borderline personality disorder, anxiety, depression, hypermobile joints who presents today  for new patient evaluation of back pain    Pain started suddenly yesterday when she leaned forward to give vaccination to a dog. After that, she felt stiff and couldn't move. She went home from work. The pain radiates into both sides of the lower back. She has some abdominal cramping as well - did start period the same day. She took some ibuprofen 2 tabs twice since it happened, which helped the cramping but still has pain in the back and sides. Getting up, standing, anything involving stabilization of the core makes her pain worse. Pain is 8/10 today, 9/10 at worst, 3/10 at best.    She had been doing gardening earlier in the week, raking etc and this flared up her left leg sciatica (down the side of the thigh and knee) and it gradually improved to a soreness. She thought perhaps it would be a good time to start PT. This was prior to injury yesterday. She does not really notice it that much now. She has noticed some weakness in the legs. She denies numbness or tingling in the legs or changes in bowel or bladder control.     She has a history of previous lower lumbar spine pain and had a lumbar MRI in 2019. She has tried flexeril and tizanidine in the past but not recently. Responded better to tizanidine    No history of injections or previous spine surgeries      -Treatment to Date:     -Medications:  ibuprofen    Current Outpatient Medications   Medication Sig Dispense Refill     ibuprofen (ADVIL/MOTRIN) 200 MG tablet        ibuprofen (ADVIL/MOTRIN) 800 MG tablet Take 1 tablet (800 mg) by mouth every 8 hours as needed for inflammatory pain 60 tablet 0     tiZANidine (ZANAFLEX) 4 MG tablet Take 1 tablet (4 mg) by mouth 3 times daily as needed for muscle spasms 45 tablet 0     ACE/ARB/ARNI NOT PRESCRIBED (INTENTIONAL) Please choose reason not prescribed from choices below.       Amphet-Dextroamphet 3-Bead ER 50 MG CP24 Take 50 mg by mouth daily as needed (ADHD) 30 capsule 0     ASPIRIN NOT PRESCRIBED (INTENTIONAL)  Please choose reason not prescribed from choices below.       cetirizine (ZYRTEC) 10 MG tablet        famotidine (PEPCID) 40 MG tablet TAKE 1 TABLET(40 MG) BY MOUTH DAILY 90 tablet 1     FLUoxetine (PROZAC) 40 MG capsule Take 1 capsule (40 mg) by mouth daily 90 capsule 0     fluticasone (FLONASE) 50 MCG/ACT nasal spray Spray 1 spray into both nostrils daily 16 g 3     lamoTRIgine (LAMICTAL) 100 MG tablet Take 1 tablet (100 mg) by mouth daily 90 tablet 0     metFORMIN (GLUCOPHAGE XR) 500 MG 24 hr tablet TAKE TWO TABLETS BY MOUTH TWICE A DAY WITH MEALS 180 tablet 1     STATIN NOT PRESCRIBED (INTENTIONAL) Please choose reason not prescribed from choices below.       No current facility-administered medications for this visit.       Allergies   Allergen Reactions     Wellbutrin [Bupropion] Hives       Past Medical History:   Diagnosis Date     Anxiety      Depressive disorder         Patient Active Problem List   Diagnosis     Major depression in complete remission (H)     Attention deficit hyperactivity disorder (ADHD), unspecified ADHD type     Class 3 severe obesity due to excess calories without serious comorbidity with body mass index (BMI) of 45.0 to 49.9 in adult (H)     Vitamin deficiency     Diabetes mellitus, type 2 (H)       No past surgical history on file.    No family history on file.    Reviewed past medical, surgical, and family history with patient found on new patient intake packet located in EMR Media tab.     SOCIAL HX: nonsmoker, special occasion alcohol use, no heavy drinking, cannabis use.    ROS: headache, joint pain, muscle pain, muscle fatigue, sciatica, excessive tiredness, anxiety.  Specifically negative for bowel/bladder dysfunction, balance changes, headache, dizziness, foot drop, fevers, chills, appetite changes, nausea/vomiting, unexplained weight loss. Otherwise 13 systems reviewed are negative. Please see the patient's intake questionnaire from today for details.    OBJECTIVE:  BP  "136/85 (BP Location: Left arm, Patient Position: Sitting, Cuff Size: Adult Large)   Pulse 68   Ht 5' 5.5\" (1.664 m)   Wt 277 lb (125.6 kg)   BMI 45.39 kg/m      PHYSICAL EXAMINATION:    --CONSTITUTIONAL:  Vital signs as above.  No acute distress.  The patient is well nourished and well groomed. Appears uncomfortable due to pain  --PSYCHIATRIC:  Appropriate mood and affect. The patient is awake, alert, oriented to person, place, time and answering questions appropriately with clear speech.    --SKIN:  Skin over the face, bilateral lower extremities, and posterior torso is clean, dry, intact without rashes.    --RESPIRATORY: Normal rhythm and effort. No abnormal accessory muscle breathing patterns noted.   --ABDOMINAL:  Non-distended.    --GROSS MOTOR: Gait is non-antalgic. Arises with pain from a seated position. Toe walking and heel walking are normal without significant difficulty.      --LOWER EXTREMITY MOTOR TESTING:  Hip flexion: right 5/5, left 5/5  Quads: right 5/5, left 5/5  Hamstrings: right 5/5, left 5/5  Dorsiflexion: right 5/5, left 5/5  Plantar flexion: right 5/5, left 5/5    Great toe MTP extension/EHL: right 5/5, left 5/5    --NEUROLOGICAL:  2/4 patellar and achilles reflexes bilaterally. Sensation to light touch is decreased in left L5 distribution, otherwise intact throughout both lower extremities. Babinski is negative. No clonus.    --MUSCULOSKELETAL: Lumbar spine inspection reveals no evidence of deformity. Range of motion is quite limited in lumbar flexion, extension, lateral rotation due to pain. Experiences worst pain in extension. Upper lumbar point tenderness to palpation. Positive bilateral lumbar paraspinal musculature tenderness.     Straight leg raising is negative    --SACROILIAC JOINT:   Negative SI joint tenderness to palpation bilaterally.    --VASCULAR:  Bilateral lower extremities are warm without any discoloration.  There is no pitting edema of the bilateral lower " extremities.      RESULTS:   Prior medical records from [facility] Everywhere were reviewed today.    Imaging: Spine imaging was personally reviewed and interpreted today. The images were shown to the patient and the findings were explained using a spine model.    MRI LUMBAR SPINE WITHOUT CONTRAST   5/3/2019 1:47 PM      HISTORY: Two-three months of low back and bilateral leg pain.     COMPARISON: None.     TECHNIQUE: Multiplanar MR imaging was performed without contrast.     FINDINGS: Numbering of the levels is based on what appear to be five  lumbar type vertebral bodies. Vertebral body alignment is normal. No  fracture is seen. No pars interarticularis defect is demonstrated. No  osseous lesion is seen. No abnormal marrow signal intensity is  identified. The conus medullaris terminates at the level of the L1  vertebral body. No intrathecal abnormality is seen. The adjacent soft  tissues are unremarkable.     Findings by specific level:     T11-T12: This level is only minimally included on the sagittal images.  On the sagittal T2 series 3 image 8 there is suggestion of a small  left central disc protrusion, although the full extent cannot be  determined. No definite stenosis is seen.     T12-L1: The disc and facet joints are normal. No stenosis is seen.     L1-L2: The disc and facet joints are normal. No stenosis is seen.     L2-L3: The disc and facet joints are normal. No stenosis is seen.     L3-L4: The disc and facet joints are normal. No stenosis is seen.     L4-L5: The disc and facet joints are normal. No stenosis is seen.     L5-S1: The disc height is well-preserved. There is a small broad-based  right foraminal disc protrusion resulting in mild right foraminal  stenosis. No central canal or left foraminal narrowing is seen. The  facet joints are unremarkable.                                                                      IMPRESSION:   1. At L5-S1 there is a small right foraminal disc  protrusion resulting  in mild right foraminal stenosis.  2. The T11-T12 level is only partially included on this study. There  appears to be a small left central disc protrusion, although the full  extent of this cannot be determined.     BRITNEY MCARTHUR MD      This note was dictated using voice recognition software. Any grammatical or context distortions are unintentional and inherent to the software.       Tomasa Cunningham FNP-C  [facility]  O. [phone]             Again, thank you for allowing me to participate in the care of your patient.        Sincerely,        JAMA Apple CNP

## 2024-04-18 NOTE — LETTER
April 18, 2024      Tana Hernandez  [address] [address] [place] [id number]        To Whom It May Concern:    Tana Hernandez was seen in our clinic. She may return to work with the following restrictions: no lifting >10 lbs, avoid frequent bending, twisting, pushing, pulling. Must be able to sit as needed and take time from work to attend PT appts. Restrictions effective until follow up appt after full course of PT or if cleared sooner by PT.       Sincerely,      Tomasa DE LA ROSAP-C  [facility]  O. [phone]

## 2024-04-19 ENCOUNTER — TELEPHONE (OUTPATIENT)
Dept: PHYSICAL MEDICINE AND REHAB | Facility: CLINIC | Age: 24
End: 2024-04-19
Payer: COMMERCIAL

## 2024-04-19 NOTE — TELEPHONE ENCOUNTER
Phone call to patient to relay the PSP's response. Stated understanding and appreciation for call back. She will check in with us on Monday with an update.

## 2024-04-19 NOTE — TELEPHONE ENCOUNTER
Would give medications several more days- please have her call us on Monday with update. If no additional relief, could consider medication changes. May take another day or two to notice response

## 2024-04-19 NOTE — TELEPHONE ENCOUNTER
Would not change meds at this time if has not tried ibuprofen 800 yet. Agree with movement, just avoidance of any bending, lifting, and exacerbating factors

## 2024-04-19 NOTE — TELEPHONE ENCOUNTER
"PSP:  Tomasa Cunningham, APRN, CNP   Last clinic visit:  4/18/24 new consult  Reason for call: difficulty with back pain  Clinical information:  Seen yesterday. Has not seen much relief with the 2 Tizanidine she has tried. She did feel drowsiness with them. She has \"not been able to really move or do anything. Is the expectation be that I stay in bed?\"   Advice given to patient: Explained that if she is able, we want her up and moving as this is better than bed rest. Encouraged pt to alternate 2 Extra Strength Tylenol tablets with the 800 mg Ibuprofen with food every 8 hours. Discussed that she can try ice to the back for her pain in place of or alternate with heat. Confirmed pharmacy if other medication recommendation.   Provider to address: Please advise.   "

## 2024-04-22 ENCOUNTER — MYC REFILL (OUTPATIENT)
Dept: FAMILY MEDICINE | Facility: CLINIC | Age: 24
End: 2024-04-22
Payer: COMMERCIAL

## 2024-04-22 DIAGNOSIS — F90.9 ATTENTION DEFICIT HYPERACTIVITY DISORDER (ADHD), UNSPECIFIED ADHD TYPE: ICD-10-CM

## 2024-04-22 RX ORDER — DEXTROAMPHETAMINE SACCHARATE, AMPHETAMINE ASPARTATE MONOHYDRATE, DEXTROAMPHETAMINE SULFATE, AMPHETAMINE SULFATE 12.5; 12.5; 12.5; 12.5 MG/1; MG/1; MG/1; MG/1
50 CAPSULE, EXTENDED RELEASE ORAL DAILY PRN
Qty: 30 CAPSULE | Refills: 0 | Status: SHIPPED | OUTPATIENT
Start: 2024-04-22 | End: 2024-06-23

## 2024-04-28 ENCOUNTER — HEALTH MAINTENANCE LETTER (OUTPATIENT)
Age: 24
End: 2024-04-28

## 2024-04-30 ENCOUNTER — TRANSFERRED RECORDS (OUTPATIENT)
Dept: MULTI SPECIALTY CLINIC | Facility: CLINIC | Age: 24
End: 2024-04-30

## 2024-04-30 ENCOUNTER — E-VISIT (OUTPATIENT)
Dept: FAMILY MEDICINE | Facility: CLINIC | Age: 24
End: 2024-04-30
Payer: COMMERCIAL

## 2024-04-30 DIAGNOSIS — B35.3 TINEA PEDIS OF BOTH FEET: Primary | ICD-10-CM

## 2024-04-30 LAB — HBA1C MFR BLD: 5.9 %

## 2024-04-30 PROCEDURE — 99421 OL DIG E/M SVC 5-10 MIN: CPT | Performed by: NURSE PRACTITIONER

## 2024-04-30 RX ORDER — CICLOPIROX OLAMINE 7.7 MG/G
CREAM TOPICAL 2 TIMES DAILY
Qty: 90 G | Refills: 1 | Status: SHIPPED | OUTPATIENT
Start: 2024-04-30 | End: 2024-08-03

## 2024-04-30 NOTE — PATIENT INSTRUCTIONS
Thank you for choosing us for your care. I have placed an order for a prescription so that you can start treatment. View your full visit summary for details by clicking on the link below. Your pharmacist will able to address any questions you may have about the medication.     If you're not feeling better within 5-7 days, please schedule an appointment.  You can schedule an appointment right here in [facility], or call [phone]  If the visit is for the same symptoms as your eVisit, we'll refund the cost of your eVisit if seen within seven days.

## 2024-05-11 ENCOUNTER — THERAPY VISIT (OUTPATIENT)
Dept: PHYSICAL THERAPY | Facility: CLINIC | Age: 24
End: 2024-05-11
Payer: COMMERCIAL

## 2024-05-11 DIAGNOSIS — M24.9 HYPERMOBILITY OF JOINT: ICD-10-CM

## 2024-05-11 DIAGNOSIS — M25.561 CHRONIC PAIN OF BOTH KNEES: ICD-10-CM

## 2024-05-11 DIAGNOSIS — M54.50 ACUTE BILATERAL LOW BACK PAIN WITHOUT SCIATICA: ICD-10-CM

## 2024-05-11 DIAGNOSIS — G89.29 CHRONIC PAIN OF BOTH KNEES: ICD-10-CM

## 2024-05-11 DIAGNOSIS — G89.29 CHRONIC BILATERAL LOW BACK PAIN WITH LEFT-SIDED SCIATICA: Primary | ICD-10-CM

## 2024-05-11 DIAGNOSIS — M54.42 CHRONIC BILATERAL LOW BACK PAIN WITH LEFT-SIDED SCIATICA: Primary | ICD-10-CM

## 2024-05-11 DIAGNOSIS — M25.562 CHRONIC PAIN OF BOTH KNEES: ICD-10-CM

## 2024-05-11 PROCEDURE — 97110 THERAPEUTIC EXERCISES: CPT | Mod: GP

## 2024-05-11 PROCEDURE — 97161 PT EVAL LOW COMPLEX 20 MIN: CPT | Mod: GP

## 2024-05-11 NOTE — PROGRESS NOTES
Tammi Farris is a 70 y.o. female patient.    Follow for ANI, dialysis    Patient seen while on dialysis  No new c/o, comfortable    Scheduled Meds:   dextromethorphan-guaiFENesin  mg/5 ml  5 mL Oral Q6H    famotidine  20 mg Oral Daily    insulin detemir U-100  5 Units Subcutaneous QHS    lactulose  10 g Oral BID    lanthanum  500 mg Per NG tube QID    metoprolol tartrate  25 mg Oral BID    polyethylene glycol  17 g Oral BID    predniSONE  60 mg Oral Daily       Review of patient's allergies indicates:   Allergen Reactions    Dobutamine Hives    Benadryl [diphenhydramine hcl] Anxiety     Pt states she feels jumpy and anxious when taking.    Darvon [propoxyphene] Nausea Only    Shellfish containing products Hives    Iodinated contrast media Hives    Lisinopril Other (See Comments)     cough    Propoxyphene hcl      Itchy (skin)^    Tizanidine Other (See Comments)     Sweaty, difficulty swallowing    Statins-hmg-coa reductase inhibitors Anxiety and Other (See Comments)     Myalgia, fatigue, palpitations, anxiety         Vital Signs Range (Last 24H):  Temp:  [97.5 °F (36.4 °C)-99.1 °F (37.3 °C)]   Pulse:  [67-96]   Resp:  [16-20]   BP: ()/(49-91)   SpO2:  [96 %-100 %]     I & O (Last 24H):    Intake/Output Summary (Last 24 hours) at 2/14/2022 1048  Last data filed at 2/13/2022 1748  Gross per 24 hour   Intake 300 ml   Output --   Net 300 ml           Physical Exam:  General appearance: well developed, well nourished, no distress  Lungs:  clear to auscultation bilaterally and normal respiratory effort  Heart: regular rate and rhythm  Abdomen: soft, non-tender non-distented; bowel sounds normal; no masses,  no organomegaly  Extremities: no cyanosis or edema, or clubbing    Laboratory:  I have reviewed all pertinent lab results within the past 24 hours.  CBC:   Recent Labs   Lab 02/14/22  0326   WBC 13.06*   RBC 2.12*   HGB 8.3*   HCT 23.7*   PLT 42*   *   MCH 39.2*   MCHC 35.0  PHYSICAL THERAPY LUMBAR EVALUATION    SUBJECTIVE:  Tana is a 23 year old female who reports that has been dealing with a lot of different pains for awhile.  She reports having shoulder pain earlier this year and last year, knee pain before that and before that back pain again.  She also reports having hypermobility which she feels may be the majority of the issue. She reports she goes through bouts of pain and reports having foot pain from having incorrect shoes. With her shoulder pain, she reports some Ulnar nerve pain and some cut off circulation resulting in a cold 4th and 5th digit.  She also reports dislocating patellas in both knees that happens once a month.  She can reduce them by kicking her knee out repetitively. She met with JAMA Sanchez who wants to try a course of physical therapy before ordering more imaging.  She also reports dislocating her left shoulder after falling into a wall. She also reports neck pain.    Patient reports symptoms of:  Pain, Stiffness / Tightness, Numbness, and Tingliness    Patient report of Pain:  Pain Rating Now: 3/10  Pain Rating at Best: 1/10  Pain Rating at Worst: 8/10  Pain Location: Low Back Pain and Left Leg Pain  Pain Quality/Description: Sore and could barely stand up  Pain Better with: Ibuprofen, Foam Roller, Getting hip to crack and Piriformis stretch  Pain Worse with: Activity, Early in Morning  Progression of Symptoms: Improved since being on weight restriction at work    Patient reports Red Flags symptoms of:  None    OBJECTIVE:  Seated Lower Extremity Manual Muscle Test / Myotome Assessment:  Hip Flexion (L2): Right Leg 4+/5, Left Leg 4+/5  Knee Extension (L3): Right Leg 4+/5, Left Leg 4+/5  Ankle Dorsiflexion (L4): Right Leg 5/5, Left Leg 5/5  Great Toe (L5): Right Leg 5/5, Left Leg 5/5  Ankle Plantarflexion (S1): Right Leg 5/5, Left Leg 5/5  Knee Flexion (S2): Right Leg 4+/5, Left Leg 4+/5    Seated Neural Tension Assessment:   Slump Test: Right Leg:      CMP:   Recent Labs   Lab 02/12/22  0525 02/12/22  0525 02/14/22  0326   *   < > 214*   CALCIUM 9.8   < > 9.4   ALBUMIN 3.4*  --   --    PROT 7.2  --   --    *   < > 139   K 4.4   < > 4.3   CO2 20*   < > 24      < > 101   BUN 79*   < > 120*   CREATININE 4.8*   < > 4.9*   ALKPHOS 94  --   --    ALT 19  --   --    AST 30  --   --    BILITOT 2.3*  --   --     < > = values in this interval not displayed.       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, on dialysis, tolerated well  HTN  DM type 2  Cirrhosis  Anemia    Continue present Rx  Watch renal function closely  Kidney biopsy when more stable          Antionette Alvarado  2/14/2022   Negative Slump Test, Left Leg: Negative Slump Test    Standing Lumbar Active ROM:  Standing Lumbar Flexion (Hands slide down thighs): Palms to Ground  Standing Lumbar Extension: No Restriction and WNL however this produces low back pain  Knee Extension: Easily hyperextends into 5 degrees of knee hyperextension bilaterally     Akua GUAN Repeated Movements Lumbar Assessment:  Repeated Lumbar Flexion in Standing: Produced after multiple reps, however alleviates with slight flexion during a posterior pelvic tilt in standing. No periphera  Repeated Lumbar Extension in Standing: Worse and Hypermobile.  No peripheralization and all in the back  Repeated Lumbar Extension with Prone Press Up: Worse and Peripheralised    Akua GUAN Classification: Other / Inconclusive: Hypermobility         ASSESSMENT:  Tana is a 23 year old female referred to Physical Therapy for     from Tomasa Cunningham.  Tana demonstrates findings of PTLumbarAssessmentFindings: Pain, Weakness, and Loss of Function that justify a need for formal Physical Therapy. These impairments interfere with their ability to perform self care tasks, work tasks, recreational activities, household chores, driving , household mobility, and community mobility as compared to their previous level of function.    Medical Diagnosis:      Treatment Diagnosis:       Clinical Decision Making (Complexity):  Clinical Presentation: Stable/Uncomplicated  Clinical Presentation Rationale: based on medical and personal factors listed in PT evaluation  Clinical Decision Making (Complexity): Low complexity      PHYSICAL THERAPY PLAN OF CARE:  Treatment Interventions:  Interventions: Manual Therapy, Neuromuscular Re-education, Therapeutic Activity, Therapeutic Exercise    Long Term Goals          Frequency of Treatment:    Duration of Treatment:           Risks and benefits of evaluation/treatment have been explained.   Patient/Family/caregiver agrees with Plan of Care.       Evaluation Time:               Signing Clinician: Delfin Buckner, PT        SUMMARY OF PLAN OF CARE:  With an unclear directional preference, a chronic history of pain throughout the entire body, clear hypermobility with all lumbar motions as well as knee and elbow motion, and a history of subluxations/dislocations of both the knee and the shoulder, Piedad presents as one with pain secondary to hypermobility.  With pain secondary to hypermobility, the best response typically comes from generalized strengthening and stability work.  We will start off with generalized core strengthening for her low back and progressively expand to strengthening and stability through the rest of the body (hips, knees, trunk, scapulas and shoulders)  until she has a strong home exercise program.

## 2024-05-31 ENCOUNTER — THERAPY VISIT (OUTPATIENT)
Dept: PHYSICAL THERAPY | Facility: CLINIC | Age: 24
End: 2024-05-31
Payer: COMMERCIAL

## 2024-05-31 DIAGNOSIS — M24.9 HYPERMOBILITY OF JOINT: ICD-10-CM

## 2024-05-31 DIAGNOSIS — G89.29 CHRONIC BILATERAL LOW BACK PAIN WITH LEFT-SIDED SCIATICA: Primary | ICD-10-CM

## 2024-05-31 DIAGNOSIS — M25.561 CHRONIC PAIN OF BOTH KNEES: ICD-10-CM

## 2024-05-31 DIAGNOSIS — G89.29 CHRONIC PAIN OF BOTH KNEES: ICD-10-CM

## 2024-05-31 DIAGNOSIS — M25.562 CHRONIC PAIN OF BOTH KNEES: ICD-10-CM

## 2024-05-31 DIAGNOSIS — M54.42 CHRONIC BILATERAL LOW BACK PAIN WITH LEFT-SIDED SCIATICA: Primary | ICD-10-CM

## 2024-05-31 PROCEDURE — 97110 THERAPEUTIC EXERCISES: CPT | Mod: GP

## 2024-06-23 ENCOUNTER — MYC REFILL (OUTPATIENT)
Dept: FAMILY MEDICINE | Facility: CLINIC | Age: 24
End: 2024-06-23
Payer: COMMERCIAL

## 2024-06-23 DIAGNOSIS — F39 MOOD DISORDER (H): ICD-10-CM

## 2024-06-23 DIAGNOSIS — F90.9 ATTENTION DEFICIT HYPERACTIVITY DISORDER (ADHD), UNSPECIFIED ADHD TYPE: ICD-10-CM

## 2024-06-24 RX ORDER — DEXTROAMPHETAMINE SACCHARATE, AMPHETAMINE ASPARTATE MONOHYDRATE, DEXTROAMPHETAMINE SULFATE, AMPHETAMINE SULFATE 12.5; 12.5; 12.5; 12.5 MG/1; MG/1; MG/1; MG/1
50 CAPSULE, EXTENDED RELEASE ORAL DAILY PRN
Qty: 30 CAPSULE | Refills: 0 | Status: SHIPPED | OUTPATIENT
Start: 2024-06-24 | End: 2024-07-26

## 2024-06-24 RX ORDER — FLUOXETINE 40 MG/1
40 CAPSULE ORAL DAILY
Qty: 90 CAPSULE | Refills: 0 | Status: SHIPPED | OUTPATIENT
Start: 2024-06-24 | End: 2024-06-25

## 2024-06-25 ENCOUNTER — OFFICE VISIT (OUTPATIENT)
Dept: FAMILY MEDICINE | Facility: CLINIC | Age: 24
End: 2024-06-25
Payer: COMMERCIAL

## 2024-06-25 VITALS
HEART RATE: 72 BPM | HEIGHT: 66 IN | OXYGEN SATURATION: 99 % | BODY MASS INDEX: 43.94 KG/M2 | TEMPERATURE: 97.5 F | WEIGHT: 273.4 LBS | DIASTOLIC BLOOD PRESSURE: 72 MMHG | SYSTOLIC BLOOD PRESSURE: 118 MMHG | RESPIRATION RATE: 9 BRPM
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
## 2024-06-25 DIAGNOSIS — E11.9 TYPE 2 DIABETES MELLITUS WITHOUT COMPLICATION, WITHOUT LONG-TERM CURRENT USE OF INSULIN (H): Primary | ICD-10-CM

## 2024-06-25 DIAGNOSIS — E11.65 TYPE 2 DIABETES MELLITUS WITH HYPERGLYCEMIA, WITHOUT LONG-TERM CURRENT USE OF INSULIN (H): ICD-10-CM

## 2024-06-25 DIAGNOSIS — F60.3 BORDERLINE PERSONALITY DISORDER (H): ICD-10-CM

## 2024-06-25 DIAGNOSIS — K21.9 GASTROESOPHAGEAL REFLUX DISEASE WITHOUT ESOPHAGITIS: ICD-10-CM

## 2024-06-25 DIAGNOSIS — F39 MOOD DISORDER (H): ICD-10-CM

## 2024-06-25 DIAGNOSIS — Z12.4 CERVICAL CANCER SCREENING: ICD-10-CM

## 2024-06-25 PROCEDURE — 99207 PR FOOT EXAM NO CHARGE: CPT | Performed by: NURSE PRACTITIONER

## 2024-06-25 PROCEDURE — G0145 SCR C/V CYTO,THINLAYER,RESCR: HCPCS | Performed by: NURSE PRACTITIONER

## 2024-06-25 PROCEDURE — G2211 COMPLEX E/M VISIT ADD ON: HCPCS | Performed by: NURSE PRACTITIONER

## 2024-06-25 PROCEDURE — 99214 OFFICE O/P EST MOD 30 MIN: CPT | Performed by: NURSE PRACTITIONER

## 2024-06-25 RX ORDER — FAMOTIDINE 40 MG/1
40 TABLET, FILM COATED ORAL DAILY
Qty: 90 TABLET | Refills: 3 | Status: SHIPPED | OUTPATIENT
Start: 2024-06-25

## 2024-06-25 RX ORDER — METFORMIN HCL 500 MG
1000 TABLET, EXTENDED RELEASE 24 HR ORAL 2 TIMES DAILY WITH MEALS
Qty: 180 TABLET | Refills: 1 | Status: SHIPPED | OUTPATIENT
Start: 2024-06-25

## 2024-06-25 RX ORDER — METFORMIN HCL 500 MG
1000 TABLET, EXTENDED RELEASE 24 HR ORAL 2 TIMES DAILY WITH MEALS
Qty: 360 TABLET | OUTPATIENT
Start: 2024-06-25

## 2024-06-25 RX ORDER — FLUOXETINE 40 MG/1
40 CAPSULE ORAL DAILY
Qty: 90 CAPSULE | Refills: 0 | Status: SHIPPED | OUTPATIENT
Start: 2024-06-25 | End: 2024-10-01

## 2024-06-25 RX ORDER — LAMOTRIGINE 100 MG/1
100 TABLET ORAL DAILY
Qty: 90 TABLET | Refills: 0 | Status: SHIPPED | OUTPATIENT
Start: 2024-06-25 | End: 2024-10-01

## 2024-06-25 ASSESSMENT — PATIENT HEALTH QUESTIONNAIRE - PHQ9
SUM OF ALL RESPONSES TO PHQ QUESTIONS 1-9: 6
10. IF YOU CHECKED OFF ANY PROBLEMS, HOW DIFFICULT HAVE THESE PROBLEMS MADE IT FOR YOU TO DO YOUR WORK, TAKE CARE OF THINGS AT HOME, OR GET ALONG WITH OTHER PEOPLE: SOMEWHAT DIFFICULT
SUM OF ALL RESPONSES TO PHQ QUESTIONS 1-9: 6

## 2024-06-25 ASSESSMENT — PAIN SCALES - GENERAL: PAINLEVEL: NO PAIN (0)

## 2024-06-25 NOTE — PROGRESS NOTES
"  Assessment & Plan     Mood disorder (H24)/Borderline personality disorder (H)  Has seen psychiatry in the past.  Currently seeing counseling.    - FLUoxetine (PROZAC) 40 MG capsule; Take 1 capsule (40 mg) by mouth daily  - lamoTRIgine (LAMICTAL) 100 MG tablet; Take 1 tablet (100 mg) by mouth daily    Type 2 diabetes mellitus without complication, without long-term current use of insulin (H)  Good control- labs done in April at [facility] where she is undergoing treatment for eating disorder.  Foot exam normal today.  Eye exam in April.  On metformin.  Bp with good control- hold on ACE./ ARB due to child bearing age and good control.  Did discuss GLP-1 to assist with weight loss and her diabetes- she will consider  - FOOT EXAM    Gastroesophageal reflux disease without esophagitis  Controlled on pepcid  - famotidine (PEPCID) 40 MG tablet; Take 1 tablet (40 mg) by mouth daily    Cat bite, initial encounter  Has been taking augmentin for an ear infection.  Will extend course.  Tdap within last 5 years.    - amoxicillin-clavulanate (AUGMENTIN) 875-125 MG tablet; Take 1 tablet by mouth 2 times daily for 4 days    Cervical cancer screening  screen  - Pap Screen Only - Recommended Age 21 - 24 Years          BMI  Estimated body mass index is 44.79 kg/m  as calculated from the following:    Height as of this encounter: 1.664 m (5' 5.51\").    Weight as of this encounter: 124 kg (273 lb 6.4 oz).   Weight management plan: Discussed healthy diet and exercise guidelines          Please abstract the following data from this visit with this patient into the appropriate field in Epic:    Tests that can be patient reported without a hard copy:      Other Tests found in the patient's chart through Chart Review/Care Everywhere:    A1c done by [facility] on this date: 4/30/24 and C[facility] done by [facility] on this date: 4/30/24    Note to Abstraction: If this section is blank, no results were found via Chart " "Review/Care Everywhere.     The longitudinal plan of care for the diagnosis(es)/condition(s) as documented were addressed during this visit. Due to the added complexity in care, I will continue to support Piedad in the subsequent management and with ongoing continuity of care.     Santa Herbert is a 23 year old, presenting for the following health issues:  Recheck Medication      6/25/2024     8:06 AM   Additional Questions   Roomed by Leticia MOLINA     Via the Health Maintenance questionnaire, the patient has reported the following services have been completed -Eye Exam: [facility] eye [facility] 2024-04-12, this information has been sent to the abstraction team.  History of Present Illness       Reason for visit:  Med follow up and general check in    She eats 2-3 servings of fruits and vegetables daily.She consumes 1 sweetened beverage(s) daily.She exercises with enough effort to increase her heart rate 10 to 19 minutes per day.  She exercises with enough effort to increase her heart rate 4 days per week. She is missing 1 dose(s) of medications per week.  She is not taking prescribed medications regularly due to remembering to take and other.     In counseling doing DBT.  Dad passed a year ago- has been working on closing hius house.  Seeing counselor every other week.  [facility].      Back pain- in physical therapy.          Review of Systems  Constitutional, HEENT, cardiovascular, pulmonary, GI, , musculoskeletal, neuro, skin, endocrine and psych systems are negative, except as otherwise noted.      Objective    /87   Pulse 72   Temp 97.5  F (36.4  C) (Temporal)   Resp (!) 9   Ht 1.664 m (5' 5.51\")   Wt 124 kg (273 lb 6.4 oz)   LMP 06/17/2024   SpO2 99%   BMI 44.79 kg/m    Body mass index is 44.79 kg/m .  Physical Exam   GENERAL: alert and no distress  EYES: Eyes grossly normal to inspection, PERRL and conjunctivae and sclerae normal  HENT: ear canals and TM's normal, nose and mouth " without ulcers or lesions  NECK: no adenopathy, no asymmetry, masses, or scars  RESP: lungs clear to auscultation - no rales, rhonchi or wheezes  CV: regular rate and rhythm, normal S1 S2, no S3 or S4, no murmur, click or rub, no peripheral edema  ABDOMEN: soft, nontender, no hepatosplenomegaly, no masses and bowel sounds normal  MS: no gross musculoskeletal defects noted, no edema  SKIN: no suspicious lesions or rashes  NEURO: Normal strength and tone, mentation intact and speech normal  PSYCH: mentation appears normal, affect normal/bright          Signed Electronically by: Gina Ferraro, NP

## 2024-06-29 LAB
BKR LAB AP GYN ADEQUACY: NORMAL
BKR LAB AP GYN INTERPRETATION: NORMAL
BKR LAB AP HPV REFLEX: NO
BKR LAB AP LMP: NORMAL
BKR LAB AP PREVIOUS ABNORMAL: NORMAL
PATH REPORT.COMMENTS IMP SPEC: NORMAL
PATH REPORT.COMMENTS IMP SPEC: NORMAL
PATH REPORT.RELEVANT HX SPEC: NORMAL

## 2024-07-15 NOTE — PROGRESS NOTES
05/31/24 0500   Appointment Info   Signing clinician's name / credentials Delfin Buckner, PT, DPT, CSCS, CLT   Total/Authorized Visits E&T   Visits Used 2   Medical Diagnosis Acute Bilateral Low Back Pain without Sciatica   PT Tx Diagnosis Chronic Bilateral Low Back Pain, Chronic Bilateral Knee Pain   Progress Note/Certification   Onset of illness/injury or Date of Surgery 01/01/24   Therapy Frequency 1x a week   Predicted Duration 8 weeks   Progress Note Due Date 07/06/24   Progress Note Completed Date 05/11/24   PT Goal 1   Goal Identifier Lifting   Goal Description Patient will be able to lift 40 lbs off the ground up into the air without low back pain worse than 2/10   Rationale to maximize safety and independence with performance of ADLs and functional tasks;to maximize safety and independence within the home;to maximize safety and independence within the community;to maximize safety and independence with transportation;to maximize safety and independence with self cares   Goal Progress Currently has lifting restrictions of 10 lbs   Target Date 08/03/24   Subjective Report   Subjective Report She reports her low back pain is doing well, but she feels some hip pain that feels like a sciatic like pain recently.  She als reports her shoulder is hurting a tad bit more and she is getting some arm numbness   Objective Measures   Objective Measures Objective Measure 1   Objective Measure 1   Objective Measure Pain on Arrival   Details 2/10 in the Back, 3/10 in the left glute going down to the lateral aspect of the left thigh and medial aspect of the left thigh and in the lateral calf as well.   Treatment Interventions (PT)   Interventions Therapeutic Procedure/Exercise   Therapeutic Procedure/Exercise   Therapeutic Procedures: strength, endurance, ROM, flexibility minutes (60776) 28   PTRx Ther Proc 1 Supine Abdominal Exercise #6 (Dead Bug)   PTRx Ther Proc 1 - Details 1x10 of true deadbug each side   Skilled  Intervention Core Strengthening HEP   Patient Response/Progress Challenging. But good and will perofrm at home   Ther Proc 1 Bike with Resistance   Ther Proc 1 - Details Level 5 resistance x5 minutes at Seat Height 6   PTRx Ther Proc 2 birddog   PTRx Ther Proc 2 - Details 1x10 each side   PTRx Ther Proc 3 Bridges with Theraband   PTRx Ther Proc 3 - Details 1x30 with Blue Band around knees   PTRx Ther Proc 4 Side Stepping With Theraband   PTRx Ther Proc 4 - Details 5 laps each direction with Blue TB around knees for 6 meters (20 feet each direction)   Plan   Home program See PTRx   Updates to plan of care Continue per POC   Plan for next session Switching to every other week. Hypermobility low back related pain with hypermobility related pain in the legs that is perceived by her as sciatica.  Same 4 exercise HEP, but progress to hard core, glutes, quads and abductors next session   Total Session Time   Timed Code Treatment Minutes 28   Total Treatment Time (sum of timed and untimed services) 28           DISCHARGE  Reason for Discharge: Patient chooses to discontinue therapy.    Equipment Issued: None    Discharge Plan: Patient to continue home program.    Referring Provider:  Tomasa Cunningham

## 2024-07-26 ENCOUNTER — MYC REFILL (OUTPATIENT)
Dept: FAMILY MEDICINE | Facility: CLINIC | Age: 24
End: 2024-07-26
Payer: COMMERCIAL

## 2024-07-26 DIAGNOSIS — F90.9 ATTENTION DEFICIT HYPERACTIVITY DISORDER (ADHD), UNSPECIFIED ADHD TYPE: ICD-10-CM

## 2024-07-26 RX ORDER — DEXTROAMPHETAMINE SACCHARATE, AMPHETAMINE ASPARTATE MONOHYDRATE, DEXTROAMPHETAMINE SULFATE, AMPHETAMINE SULFATE 12.5; 12.5; 12.5; 12.5 MG/1; MG/1; MG/1; MG/1
50 CAPSULE, EXTENDED RELEASE ORAL DAILY PRN
Qty: 30 CAPSULE | Refills: 0 | Status: SHIPPED | OUTPATIENT
Start: 2024-07-26 | End: 2024-08-23

## 2024-07-26 NOTE — TELEPHONE ENCOUNTER
PDMP reviewed. No red flags for filling behaviors and no other controlled substance rx's beside gabapentin. Rx refilled x30d

## 2024-08-03 ENCOUNTER — MYC REFILL (OUTPATIENT)
Dept: FAMILY MEDICINE | Facility: CLINIC | Age: 24
End: 2024-08-03
Payer: COMMERCIAL

## 2024-08-03 DIAGNOSIS — B35.3 TINEA PEDIS OF BOTH FEET: ICD-10-CM

## 2024-08-05 RX ORDER — CICLOPIROX OLAMINE 7.7 MG/G
CREAM TOPICAL 2 TIMES DAILY
Qty: 90 G | Refills: 1 | Status: SHIPPED | OUTPATIENT
Start: 2024-08-05

## 2024-08-23 ENCOUNTER — MYC REFILL (OUTPATIENT)
Dept: FAMILY MEDICINE | Facility: CLINIC | Age: 24
End: 2024-08-23
Payer: COMMERCIAL

## 2024-08-23 DIAGNOSIS — F90.9 ATTENTION DEFICIT HYPERACTIVITY DISORDER (ADHD), UNSPECIFIED ADHD TYPE: ICD-10-CM

## 2024-08-26 RX ORDER — DEXTROAMPHETAMINE SACCHARATE, AMPHETAMINE ASPARTATE MONOHYDRATE, DEXTROAMPHETAMINE SULFATE, AMPHETAMINE SULFATE 12.5; 12.5; 12.5; 12.5 MG/1; MG/1; MG/1; MG/1
50 CAPSULE, EXTENDED RELEASE ORAL DAILY PRN
Qty: 30 CAPSULE | Refills: 0 | Status: SHIPPED | OUTPATIENT
Start: 2024-08-26 | End: 2024-08-28

## 2024-08-28 ENCOUNTER — MYC REFILL (OUTPATIENT)
Dept: FAMILY MEDICINE | Facility: CLINIC | Age: 24
End: 2024-08-28
Payer: COMMERCIAL

## 2024-08-28 DIAGNOSIS — F90.9 ATTENTION DEFICIT HYPERACTIVITY DISORDER (ADHD), UNSPECIFIED ADHD TYPE: ICD-10-CM

## 2024-08-30 RX ORDER — DEXTROAMPHETAMINE SACCHARATE, AMPHETAMINE ASPARTATE MONOHYDRATE, DEXTROAMPHETAMINE SULFATE, AMPHETAMINE SULFATE 12.5; 12.5; 12.5; 12.5 MG/1; MG/1; MG/1; MG/1
50 CAPSULE, EXTENDED RELEASE ORAL DAILY PRN
Qty: 30 CAPSULE | Refills: 0 | Status: SHIPPED | OUTPATIENT
Start: 2024-08-30 | End: 2024-10-01

## 2024-10-01 ENCOUNTER — MYC REFILL (OUTPATIENT)
Dept: FAMILY MEDICINE | Facility: CLINIC | Age: 24
End: 2024-10-01
Payer: COMMERCIAL

## 2024-10-01 DIAGNOSIS — F60.3 BORDERLINE PERSONALITY DISORDER (H): ICD-10-CM

## 2024-10-01 DIAGNOSIS — F39 MOOD DISORDER (H): ICD-10-CM

## 2024-10-01 DIAGNOSIS — F90.9 ATTENTION DEFICIT HYPERACTIVITY DISORDER (ADHD), UNSPECIFIED ADHD TYPE: ICD-10-CM

## 2024-10-01 RX ORDER — FLUOXETINE 40 MG/1
40 CAPSULE ORAL DAILY
Qty: 90 CAPSULE | Refills: 3 | Status: SHIPPED | OUTPATIENT
Start: 2024-10-01

## 2024-10-01 RX ORDER — LAMOTRIGINE 100 MG/1
100 TABLET ORAL DAILY
Qty: 90 TABLET | Refills: 3 | Status: SHIPPED | OUTPATIENT
Start: 2024-10-01

## 2024-10-03 RX ORDER — DEXTROAMPHETAMINE SACCHARATE, AMPHETAMINE ASPARTATE MONOHYDRATE, DEXTROAMPHETAMINE SULFATE, AMPHETAMINE SULFATE 12.5; 12.5; 12.5; 12.5 MG/1; MG/1; MG/1; MG/1
50 CAPSULE, EXTENDED RELEASE ORAL DAILY PRN
Qty: 30 CAPSULE | Refills: 0 | Status: SHIPPED | OUTPATIENT
Start: 2024-10-03 | End: 2024-10-14

## 2024-10-08 ENCOUNTER — MYC REFILL (OUTPATIENT)
Dept: FAMILY MEDICINE | Facility: CLINIC | Age: 24
End: 2024-10-08
Payer: COMMERCIAL

## 2024-10-08 DIAGNOSIS — F90.9 ATTENTION DEFICIT HYPERACTIVITY DISORDER (ADHD), UNSPECIFIED ADHD TYPE: ICD-10-CM

## 2024-10-14 RX ORDER — DEXTROAMPHETAMINE SACCHARATE, AMPHETAMINE ASPARTATE MONOHYDRATE, DEXTROAMPHETAMINE SULFATE, AMPHETAMINE SULFATE 12.5; 12.5; 12.5; 12.5 MG/1; MG/1; MG/1; MG/1
50 CAPSULE, EXTENDED RELEASE ORAL DAILY PRN
Qty: 30 CAPSULE | Refills: 0 | OUTPATIENT
Start: 2024-10-14

## 2024-10-14 RX ORDER — DEXTROAMPHETAMINE SACCHARATE, AMPHETAMINE ASPARTATE MONOHYDRATE, DEXTROAMPHETAMINE SULFATE, AMPHETAMINE SULFATE 12.5; 12.5; 12.5; 12.5 MG/1; MG/1; MG/1; MG/1
50 CAPSULE, EXTENDED RELEASE ORAL DAILY PRN
Qty: 30 CAPSULE | Refills: 0 | Status: SHIPPED | OUTPATIENT
Start: 2024-10-14

## 2024-10-14 NOTE — TELEPHONE ENCOUNTER
Patient calling in regarding prescription request. She said [facility] is OUT of stock, but [facility] [place] [facility] Carlton'd up again.

## 2024-10-29 ENCOUNTER — MYC MEDICAL ADVICE (OUTPATIENT)
Dept: FAMILY MEDICINE | Facility: CLINIC | Age: 24
End: 2024-10-29
Payer: COMMERCIAL

## 2024-10-29 DIAGNOSIS — E11.65 TYPE 2 DIABETES MELLITUS WITH HYPERGLYCEMIA, WITHOUT LONG-TERM CURRENT USE OF INSULIN (H): Primary | ICD-10-CM

## 2024-11-14 ENCOUNTER — VIRTUAL VISIT (OUTPATIENT)
Dept: PHARMACY | Facility: CLINIC | Age: 24
End: 2024-11-14
Attending: NURSE PRACTITIONER
Payer: COMMERCIAL

## 2024-11-14 DIAGNOSIS — E11.9 TYPE 2 DIABETES MELLITUS WITHOUT COMPLICATION, WITHOUT LONG-TERM CURRENT USE OF INSULIN (H): Primary | ICD-10-CM

## 2024-11-14 DIAGNOSIS — F32.1 CURRENT MODERATE EPISODE OF MAJOR DEPRESSIVE DISORDER, UNSPECIFIED WHETHER RECURRENT (H): ICD-10-CM

## 2024-11-14 DIAGNOSIS — J30.81 ALLERGIC RHINITIS DUE TO ANIMALS: ICD-10-CM

## 2024-11-14 DIAGNOSIS — F90.9 ATTENTION DEFICIT HYPERACTIVITY DISORDER (ADHD), UNSPECIFIED ADHD TYPE: ICD-10-CM

## 2024-11-14 PROCEDURE — 99605 MTMS BY PHARM NP 15 MIN: CPT | Mod: 95 | Performed by: PHARMACIST

## 2024-11-14 PROCEDURE — 99607 MTMS BY PHARM ADDL 15 MIN: CPT | Mod: 95 | Performed by: PHARMACIST

## 2024-11-14 NOTE — PATIENT INSTRUCTIONS
"Recommendations from today's MTM visit:                                                    MTM (medication therapy management) is a service provided by a clinical pharmacist designed to help you get the most of out of your medicines.   Today we reviewed what your medicines are for, how to know if they are working, that your medicines are safe and how to make your medicine regimen as easy as possible.        Start Ozempic 0.25 mg under the skin once weekly for 4 weeks, then if tolerating increase to 0.5 mg under the skin weekly thereafter.     Review how to use the pen device for your injections at this web address: https://www.[employer]/how-to-take/ozempic-pen.html    I've signed you up for a co-pay card to bring to the pharmacy (see below): If the image is not showing up in this message the information is ID: [id number] RxGroup: [id number] RxBIN: [id number] RxPCN: [id number]    Follow-up: I will call you on Monday, December 30th at 1 PM for phone follow-up (let me know if you prefer video visit)        It was great speaking with you today.  I value your experience and would be very thankful for your time in providing feedback in our clinic survey. In the next few days, you may receive an email or text message from [employer] with a link to a survey related to your  clinical pharmacist.\"     To schedule another MTM appointment, please call the clinic directly or you may call the MTM scheduling line at [phone] or toll-free at [phone].     My Clinical Pharmacist's contact information:                                                      Please feel free to contact me with any questions or concerns you have.      Gold Ha, PharmD, [facility]  Medication Therapy Management Pharmacist  Voicemail: [phone]  "

## 2024-11-14 NOTE — PROGRESS NOTES
Medication Therapy Management (MTM) Encounter    ASSESSMENT:                            Medication Adherence/Access: No issues identified.    Diabetes: A1c is at goal of <7%. Given patients BMI and disordered eating may benefit from addition of GLP-1 agonist.     Depression and ADHD: Stable per patient. Plans to meet with psychiatry.     Allergy: Stable.     PLAN:                            Start Ozempic 0.25 mg under the skin once weekly for 4 weeks, then if tolerating increase to 0.5 mg under the skin weekly thereafter.     Review how to use the pen device for your injections at this web address: https://www.[employer]/how-to-take/ozempic-pen.html    I've signed you up for a co-pay card to bring to the pharmacy (see below): If the image is not showing up in this message the information is ID: [id number] RxGroup: [id number] RxBIN: [id number] RxPCN: [id number]    Follow-up: I will call you on Monday, December 30th at 1 PM for phone follow-up (let me know if you prefer video visit)        SUBJECTIVE/OBJECTIVE:                          Piedad Hernandez is a 24 year old female seen for an initial visit. She was referred to me from JAMA Cisse CNP.      Reason for visit: Diabetes management.    Allergies/ADRs: Reviewed in chart  Past Medical History: Reviewed in chart  Tobacco: She reports that she has never smoked. She has never used smokeless tobacco.  Alcohol: Less than 1 beverage / month  THC/CBD: 1-2 times per week  Caffeine: a few cups of coffee/day; rare energy drinks    Medication Adherence/Access: Patient uses pill box(es).  Patient takes medications 1 time(s) per day.   Per patient, misses medication 3-5 time(s) per week misses second dose   Medication barriers: affording medications.   The patient fills medications at [facility]: NO, fills medications at [facility].    Diabetes   Metformin XR 1,000 mg twice daily  She is interested in pursuing GLP-1 agonist. Concerned about other medications that can cause weight  gain.  Not taking aspirin due to age  Patient is not experiencing side effects.  Current diabetes symptoms: none  Diet/Exercise: Patient has been working with [employer] for disordered eating.       Blood sugar monitoring: never  Eye exam is up to date  Foot exam is up to date  Lab Results   Component Value Date    A1C 6.4 12/01/2023    A1C 6.5 06/28/2022    A1C 7.8 09/13/2021       Mental Health   Depression and ADHD  Mydayis ER 50 mg daily as needed (skips on certain days).   Patient reports no current medication side effects.  Mood is overall stable per patient. She went off fluoxetine for about a month. She plans on        Allergy   cetirizine 10 mg daily as needed (works as a  and will take especially on a cat heavy day or in the fall)  Flonase (fluticasone) nasal spray - 1 spray(s) each nostril once daily  Patient reports no current medication side effects.     Patient feels that current therapy is effective.        Today's Vitals: There were no vitals taken for this visit.    BP Readings from Last 3 Encounters:   06/25/24 118/72   04/18/24 136/85   12/01/23 (!) 144/104     Wt Readings from Last 5 Encounters:   06/25/24 273 lb 6.4 oz (124 kg)   04/18/24 277 lb (125.6 kg)   12/01/23 287 lb (130.2 kg)   10/13/23 275 lb (124.7 kg)   07/07/23 275 lb (124.7 kg)     ----------------      I spent 30 minutes with this patient today. All changes were made via collaborative practice agreement with Gina Ferraro NP. A copy of the visit note was provided to the patient's provider(s).    A summary of these recommendations was sent via [employer].    Gold Ha, PharmD, BCACP  Medication Therapy Management Pharmacist  Voicemail: [phone]      Telemedicine Visit Details  The patient's medications can be safely assessed via a telemedicine encounter.  Type of service:  Video Conference via [employer]  Originating Location (pt. Location): Home    Distant Location (provider location):  Off-site  Start Time:  1:05 PM  End  Time:  1:35 PM     Medication Therapy Recommendations  Diabetes mellitus, type 2 (H)   1 Current Medication: metFORMIN (GLUCOPHAGE XR) 500 MG 24 hr tablet   Current Medication Sig: Take 2 tablets (1,000 mg) by mouth 2 times daily (with meals)   Rationale: Synergistic therapy - Needs additional medication therapy - Indication   Recommendation: Start Medication - Ozempic (0.25 or 0.5 MG/DOSE) 2 MG/3ML Sopn   Status: Accepted per CPA   Identified Date: 11/14/2024 Completed Date: 11/14/2024

## 2024-11-24 ENCOUNTER — HEALTH MAINTENANCE LETTER (OUTPATIENT)
Age: 24
End: 2024-11-24

## 2024-12-09 ENCOUNTER — TELEPHONE (OUTPATIENT)
Dept: FAMILY MEDICINE | Facility: CLINIC | Age: 24
End: 2024-12-09

## 2024-12-09 NOTE — TELEPHONE ENCOUNTER
PRIOR AUTHORIZATION DENIED    Medication: AMPHET-DEXTROAMPHET 3-BEAD ER 50 MG PO CP24  Insurance Company: [employer] [place] - Phone [phone] Fax [phone]  Denial Date: 12/8/2024  Denial Reason(s): Must have documentation that tried/failed 3 preferred alternatives      Appeal Information:       Patient Notified: NO

## 2024-12-10 ENCOUNTER — TELEPHONE (OUTPATIENT)
Dept: FAMILY MEDICINE | Facility: CLINIC | Age: 24
End: 2024-12-10

## 2024-12-10 NOTE — TELEPHONE ENCOUNTER
Patient is going to get the medication that was denied this month. She is wondering if you could prescribe the focalin for next month

## 2024-12-17 ENCOUNTER — MYC MEDICAL ADVICE (OUTPATIENT)
Dept: FAMILY MEDICINE | Facility: CLINIC | Age: 24
End: 2024-12-17

## 2024-12-17 DIAGNOSIS — F39 MOOD DISORDER (H): ICD-10-CM

## 2024-12-17 DIAGNOSIS — F60.3 BORDERLINE PERSONALITY DISORDER (H): ICD-10-CM

## 2024-12-17 RX ORDER — LAMOTRIGINE 100 MG/1
100 TABLET ORAL DAILY
Qty: 90 TABLET | Refills: 3 | Status: SHIPPED | OUTPATIENT
Start: 2024-12-17

## 2024-12-18 ENCOUNTER — DOCUMENTATION ONLY (OUTPATIENT)
Dept: PHARMACY | Facility: CLINIC | Age: 24
End: 2024-12-18

## 2024-12-18 NOTE — Clinical Note
Please abstract the following data from this visit with this patient into the appropriate field in Epic:  Tests that can be patient reported without a hard copy:  Other Tests found in the patient's chart through Chart Review/Care Everywhere:  A1c done by this group [facility] on this date: 4/30/2024  Note to Abstraction: If this section is blank, no results were found via Chart Review/Care Everywhere.  Gold Ha, KelinD, BCACP Medication Therapy Management Pharmacist Voicemail: [phone]

## 2024-12-18 NOTE — PROGRESS NOTES
Please abstract the following data from this visit with this patient into the appropriate field in Epic:    Tests that can be patient reported without a hard copy:    Other Tests found in the patient's chart through Chart Review/Care Everywhere:    A1c done by this group [facility] on this date: 4/30/2024    Note to Abstraction: If this section is blank, no results were found via Chart Review/Care Everywhere.    Gold Ha, KelinD, BCACP  Medication Therapy Management Pharmacist  Voicemail: [phone]

## 2025-01-11 ENCOUNTER — HEALTH MAINTENANCE LETTER (OUTPATIENT)
Age: 25
End: 2025-01-11

## 2025-01-15 ENCOUNTER — MYC MEDICAL ADVICE (OUTPATIENT)
Dept: FAMILY MEDICINE | Facility: CLINIC | Age: 25
End: 2025-01-15

## 2025-01-15 ENCOUNTER — MYC REFILL (OUTPATIENT)
Dept: FAMILY MEDICINE | Facility: CLINIC | Age: 25
End: 2025-01-15

## 2025-01-15 DIAGNOSIS — F90.9 ATTENTION DEFICIT HYPERACTIVITY DISORDER (ADHD), UNSPECIFIED ADHD TYPE: ICD-10-CM

## 2025-01-15 RX ORDER — DEXTROAMPHETAMINE SACCHARATE, AMPHETAMINE ASPARTATE MONOHYDRATE, DEXTROAMPHETAMINE SULFATE, AMPHETAMINE SULFATE 12.5; 12.5; 12.5; 12.5 MG/1; MG/1; MG/1; MG/1
50 CAPSULE, EXTENDED RELEASE ORAL DAILY PRN
Qty: 30 CAPSULE | Refills: 0 | Status: SHIPPED | OUTPATIENT
Start: 2025-01-15

## 2025-02-03 ENCOUNTER — OFFICE VISIT (OUTPATIENT)
Dept: PHARMACY | Facility: CLINIC | Age: 25
End: 2025-02-03
Payer: COMMERCIAL

## 2025-02-03 DIAGNOSIS — E11.9 TYPE 2 DIABETES MELLITUS WITHOUT COMPLICATION, WITHOUT LONG-TERM CURRENT USE OF INSULIN (H): ICD-10-CM

## 2025-02-03 DIAGNOSIS — M54.50 ACUTE BILATERAL LOW BACK PAIN WITHOUT SCIATICA: ICD-10-CM

## 2025-02-03 PROCEDURE — 99605 MTMS BY PHARM NP 15 MIN: CPT | Performed by: PHARMACIST

## 2025-02-03 NOTE — PROGRESS NOTES
Medication Therapy Management (MTM) Encounter    ASSESSMENT:                            Medication Adherence/Access: See below for considerations.    Diabetes: A1c is at goal of <7%.  Weight has improved. Would benefit form continuing current dose.     PLAN:                            Continue OZEMPIC 0.5 mg under the skin weekly     Refilled TIZANIDINE    [facility]    SUBJECTIVE/OBJECTIVE:                          Piedad Hernandez is a 24 year old female seen for a follow-up visit.       Reason for visit: Diabetes Follow-Up.    Allergies/ADRs: Reviewed in chart  Past Medical History: Reviewed in chart  Tobacco: She reports that she has never smoked. She has never used smokeless tobacco.  Alcohol: Less than 1 beverage / month    Medication Adherence/Access: No issues reported - patient arrived half-way though appointment time so focus primarily on last medication change.     Diabetes   Metformin XR 1,000 mg twice daily  Ozempic 0.5 mg weekly  Not taking aspirin due to age  Patient is not experiencing side effects.  Current diabetes symptoms: none  Diet/Exercise: Patient has been working with [employer] for disordered eating - finds medication helps with food noise.       Blood sugar monitoring: never  Eye exam is up to date  Foot exam is up to date  Lab Results   Component Value Date    A1C 6.4 12/01/2023    A1C 6.5 06/28/2022    A1C 7.8 09/13/2021     Today's Vitals: Wt 265 lb 3.2 oz (120.3 kg)   BMI 43.44 kg/m      Wt Readings from Last 5 Encounters:   02/03/25 265 lb 3.2 oz (120.3 kg)   06/25/24 273 lb 6.4 oz (124 kg)   04/18/24 277 lb (125.6 kg)   12/01/23 287 lb (130.2 kg)   10/13/23 275 lb (124.7 kg)       ----------------      I spent 15 minutes with this patient today. All changes were made via collaborative practice agreement with Gina Ferraro NP.     A summary of these recommendations was given to the patient.    Gold Ha, PharmD, BCACP  Medication Therapy Management  Pharmacist  Voicemail: [phone]         Medication Therapy Recommendations  No medication therapy recommendations to display

## 2025-02-03 NOTE — PATIENT INSTRUCTIONS
"Recommendations from today's MTM visit:                                                    MTM (medication therapy management) is a service provided by a clinical pharmacist designed to help you get the most of out of your medicines.      Continue OZEMPIC 0.5 mg under the skin weekly     Refilled TIZANIDINE    Follow-up: 4 weeks via [employer]    It was great speaking with you today.  I value your experience and would be very thankful for your time in providing feedback in our clinic survey. In the next few days, you may receive an email or text message from [employer] with a link to a survey related to your  clinical pharmacist.\"     To schedule another MTM appointment, please call the clinic directly or you may call the MTM scheduling line at [phone] or toll-free at [phone].     My Clinical Pharmacist's contact information:                                                      Please feel free to contact me with any questions or concerns you have.      Gold Ha, PharmD, [facility]  Medication Therapy Management Pharmacist  Voicemail: [phone]      "

## 2025-02-05 VITALS — WEIGHT: 265.2 LBS | BODY MASS INDEX: 43.44 KG/M2

## 2025-02-17 DIAGNOSIS — E11.9 TYPE 2 DIABETES MELLITUS WITHOUT COMPLICATION, WITHOUT LONG-TERM CURRENT USE OF INSULIN (H): ICD-10-CM

## 2025-02-17 DIAGNOSIS — M54.50 ACUTE BILATERAL LOW BACK PAIN WITHOUT SCIATICA: ICD-10-CM

## 2025-02-17 RX ORDER — SEMAGLUTIDE 0.68 MG/ML
INJECTION, SOLUTION SUBCUTANEOUS
Qty: 3 ML | Refills: 3 | Status: SHIPPED | OUTPATIENT
Start: 2025-02-17

## 2025-02-25 ENCOUNTER — MYC REFILL (OUTPATIENT)
Dept: FAMILY MEDICINE | Facility: CLINIC | Age: 25
End: 2025-02-25
Payer: COMMERCIAL

## 2025-02-25 DIAGNOSIS — F90.9 ATTENTION DEFICIT HYPERACTIVITY DISORDER (ADHD), UNSPECIFIED ADHD TYPE: ICD-10-CM

## 2025-02-25 RX ORDER — DEXTROAMPHETAMINE SACCHARATE, AMPHETAMINE ASPARTATE MONOHYDRATE, DEXTROAMPHETAMINE SULFATE, AMPHETAMINE SULFATE 12.5; 12.5; 12.5; 12.5 MG/1; MG/1; MG/1; MG/1
50 CAPSULE, EXTENDED RELEASE ORAL DAILY PRN
Qty: 30 CAPSULE | Refills: 0 | Status: SHIPPED | OUTPATIENT
Start: 2025-02-25

## 2025-03-09 ENCOUNTER — HEALTH MAINTENANCE LETTER (OUTPATIENT)
Age: 25
End: 2025-03-09

## 2025-03-17 DIAGNOSIS — R09.81 NASAL CONGESTION: ICD-10-CM

## 2025-03-18 RX ORDER — FLUTICASONE PROPIONATE 50 MCG
2 SPRAY, SUSPENSION (ML) NASAL DAILY
Qty: 16 G | Refills: 0 | Status: SHIPPED | OUTPATIENT
Start: 2025-03-18

## 2025-03-31 ENCOUNTER — MYC REFILL (OUTPATIENT)
Dept: FAMILY MEDICINE | Facility: CLINIC | Age: 25
End: 2025-03-31
Payer: COMMERCIAL

## 2025-03-31 DIAGNOSIS — F90.9 ATTENTION DEFICIT HYPERACTIVITY DISORDER (ADHD), UNSPECIFIED ADHD TYPE: ICD-10-CM

## 2025-03-31 RX ORDER — DEXTROAMPHETAMINE SACCHARATE, AMPHETAMINE ASPARTATE MONOHYDRATE, DEXTROAMPHETAMINE SULFATE, AMPHETAMINE SULFATE 12.5; 12.5; 12.5; 12.5 MG/1; MG/1; MG/1; MG/1
50 CAPSULE, EXTENDED RELEASE ORAL DAILY PRN
Qty: 30 CAPSULE | Refills: 0 | Status: SHIPPED | OUTPATIENT
Start: 2025-03-31

## 2025-05-19 ENCOUNTER — TELEPHONE (OUTPATIENT)
Dept: FAMILY MEDICINE | Facility: CLINIC | Age: 25
End: 2025-05-19
Payer: COMMERCIAL

## 2025-05-19 NOTE — TELEPHONE ENCOUNTER
Patient Quality Outreach    Patient is due for the following:   Diabetes -  A1C, LDL (Fasting), Eye Exam, Microalbumin, and Foot Exam  Physical Preventive Adult Physical    Action(s) Taken:   No follow up needed at this time.  Patient has upcoming appointment, these items will be addressed at that time.    Type of outreach:    Chart review performed, no outreach needed.    Questions for provider review:    None         Anayeli Zamorano MA  Chart routed to None.

## 2025-06-04 ENCOUNTER — MYC REFILL (OUTPATIENT)
Dept: FAMILY MEDICINE | Facility: CLINIC | Age: 25
End: 2025-06-04
Payer: COMMERCIAL

## 2025-06-04 DIAGNOSIS — F90.9 ATTENTION DEFICIT HYPERACTIVITY DISORDER (ADHD), UNSPECIFIED ADHD TYPE: ICD-10-CM

## 2025-06-05 RX ORDER — DEXTROAMPHETAMINE SACCHARATE, AMPHETAMINE ASPARTATE MONOHYDRATE, DEXTROAMPHETAMINE SULFATE, AMPHETAMINE SULFATE 12.5; 12.5; 12.5; 12.5 MG/1; MG/1; MG/1; MG/1
50 CAPSULE, EXTENDED RELEASE ORAL DAILY PRN
Qty: 30 CAPSULE | Refills: 0 | Status: SHIPPED | OUTPATIENT
Start: 2025-06-05

## 2025-06-17 ENCOUNTER — MYC MEDICAL ADVICE (OUTPATIENT)
Dept: FAMILY MEDICINE | Facility: CLINIC | Age: 25
End: 2025-06-17
Payer: COMMERCIAL

## 2025-06-19 ENCOUNTER — VIRTUAL VISIT (OUTPATIENT)
Dept: FAMILY MEDICINE | Facility: CLINIC | Age: 25
End: 2025-06-19
Payer: COMMERCIAL

## 2025-06-19 DIAGNOSIS — F50.82 AVOIDANT-RESTRICTIVE FOOD INTAKE DISORDER (ARFID): ICD-10-CM

## 2025-06-19 DIAGNOSIS — E11.65 TYPE 2 DIABETES MELLITUS WITH HYPERGLYCEMIA, WITHOUT LONG-TERM CURRENT USE OF INSULIN (H): Primary | ICD-10-CM

## 2025-06-19 DIAGNOSIS — F32.1 CURRENT MODERATE EPISODE OF MAJOR DEPRESSIVE DISORDER, UNSPECIFIED WHETHER RECURRENT (H): ICD-10-CM

## 2025-06-19 ASSESSMENT — PATIENT HEALTH QUESTIONNAIRE - PHQ9
SUM OF ALL RESPONSES TO PHQ QUESTIONS 1-9: 7
10. IF YOU CHECKED OFF ANY PROBLEMS, HOW DIFFICULT HAVE THESE PROBLEMS MADE IT FOR YOU TO DO YOUR WORK, TAKE CARE OF THINGS AT HOME, OR GET ALONG WITH OTHER PEOPLE: SOMEWHAT DIFFICULT
SUM OF ALL RESPONSES TO PHQ QUESTIONS 1-9: 7

## 2025-06-19 NOTE — TELEPHONE ENCOUNTER
Sent patient a MyChart and also attempted calling. LVM to return call.     Please schedule patient per provider message below.     Crystal Solorzano, JESSE

## 2025-06-19 NOTE — PROGRESS NOTES
"Piedad is a 24 year old who is being evaluated via a billable telephone visit.    What phone number would you like to be contacted at? [phone]   How would you like to obtain your AVS? Matthew  Originating Location (pt. Location): Home    Distant Location (provider location):  On-site  Telephone visit completed due to the patient did not have access to video, while the distant provider did.    Assessment & Plan     Type 2 diabetes mellitus with hyperglycemia, without long-term current use of insulin (H)  Last A1C 5.9 with good control on metformin and ozempic.    - REVIEW OF HEALTH MAINTENANCE PROTOCOL ORDERS    Avoidant-restrictive food intake disorder (ARFID)  Referral for counseling  - Adult Mental Health  Referral; Future    Current moderate episode of major depressive disorder, unspecified whether recurrent (H)  Referral counseling.   - Adult Mental Health  Referral; Future          BMI  Estimated body mass index is 43.44 kg/m  as calculated from the following:    Height as of 6/25/24: 1.664 m (5' 5.51\").    Weight as of 2/3/25: 120.3 kg (265 lb 3.2 oz).   Weight management plan: Discussed healthy diet and exercise guidelines      Follow-up       Subjective   Piedad is a 24 year old, presenting for the following health issues:  A.D.H.D and Weight Loss      6/19/2025     2:27 PM   Additional Questions   Roomed by Crystal         6/19/2025     2:27 PM   Patient Reported Additional Medications   Patient reports taking the following new medications none     A.D.H.D    History of Present Illness       Reason for visit:  ADHD follow up    She eats 2-3 servings of fruits and vegetables daily.She consumes 1 sweetened beverage(s) daily.She exercises with enough effort to increase her heart rate 30 to 60 minutes per day.  She exercises with enough effort to increase her heart rate 3 or less days per week. She is missing 1 dose(s) of medications per week.  She is not taking prescribed medications " regularly due to remembering to take.        She has been on mydayis for her ADHD for many years.  It seems to maybe not be working as well and she is looking for a cheaper option- this is 90$  amonth  Has struggled with her weight in the past.  Had been diagnosed with eating disorder not otherwise specified.  Did look into treatment through [facility] but she does not qualify.  Will restrict her diet and then exercise.  No binging or purging.  Denies thoughts of self harm.  Feels mood is good on lamictal.  Has lost 50lbs with ozempic.        Review of Systems  Constitutional, HEENT, cardiovascular, pulmonary, GI, , musculoskeletal, neuro, skin, endocrine and psych systems are negative, except as otherwise noted.      Objective           Vitals:  No vitals were obtained today due to virtual visit.    Physical Exam   General: Alert and no distress //Respiratory: No audible wheeze, cough, or shortness of breath // Psychiatric:  Appropriate affect, tone, and pace of words            Phone call duration: 10 minutes  Signed Electronically by: Gina Ferraro NP

## 2025-06-22 ENCOUNTER — HEALTH MAINTENANCE LETTER (OUTPATIENT)
Age: 25
End: 2025-06-22

## 2025-06-23 ENCOUNTER — PATIENT OUTREACH (OUTPATIENT)
Dept: CARE COORDINATION | Facility: CLINIC | Age: 25
End: 2025-06-23
Payer: COMMERCIAL

## 2025-06-28 DIAGNOSIS — K21.9 GASTROESOPHAGEAL REFLUX DISEASE WITHOUT ESOPHAGITIS: ICD-10-CM

## 2025-06-30 RX ORDER — FAMOTIDINE 40 MG/1
40 TABLET, FILM COATED ORAL DAILY
Qty: 90 TABLET | Refills: 3 | Status: SHIPPED | OUTPATIENT
Start: 2025-06-30

## 2025-07-10 ENCOUNTER — MYC REFILL (OUTPATIENT)
Dept: FAMILY MEDICINE | Facility: CLINIC | Age: 25
End: 2025-07-10
Payer: COMMERCIAL

## 2025-07-10 DIAGNOSIS — F90.9 ATTENTION DEFICIT HYPERACTIVITY DISORDER (ADHD), UNSPECIFIED ADHD TYPE: ICD-10-CM

## 2025-07-10 RX ORDER — DEXTROAMPHETAMINE SACCHARATE, AMPHETAMINE ASPARTATE MONOHYDRATE, DEXTROAMPHETAMINE SULFATE, AMPHETAMINE SULFATE 12.5; 12.5; 12.5; 12.5 MG/1; MG/1; MG/1; MG/1
50 CAPSULE, EXTENDED RELEASE ORAL DAILY PRN
Qty: 30 CAPSULE | Refills: 0 | Status: SHIPPED | OUTPATIENT
Start: 2025-07-10

## 2025-08-11 ENCOUNTER — MYC REFILL (OUTPATIENT)
Dept: FAMILY MEDICINE | Facility: CLINIC | Age: 25
End: 2025-08-11
Payer: COMMERCIAL

## 2025-08-11 ENCOUNTER — MYC MEDICAL ADVICE (OUTPATIENT)
Dept: FAMILY MEDICINE | Facility: CLINIC | Age: 25
End: 2025-08-11
Payer: COMMERCIAL

## 2025-08-11 DIAGNOSIS — M54.50 ACUTE BILATERAL LOW BACK PAIN WITHOUT SCIATICA: ICD-10-CM

## 2025-08-11 DIAGNOSIS — F90.9 ATTENTION DEFICIT HYPERACTIVITY DISORDER (ADHD), UNSPECIFIED ADHD TYPE: ICD-10-CM

## 2025-08-12 RX ORDER — DEXTROAMPHETAMINE SACCHARATE, AMPHETAMINE ASPARTATE MONOHYDRATE, DEXTROAMPHETAMINE SULFATE, AMPHETAMINE SULFATE 12.5; 12.5; 12.5; 12.5 MG/1; MG/1; MG/1; MG/1
50 CAPSULE, EXTENDED RELEASE ORAL DAILY PRN
Qty: 30 CAPSULE | Refills: 0 | Status: SHIPPED | OUTPATIENT
Start: 2025-08-12

## 2025-08-14 ENCOUNTER — TELEPHONE (OUTPATIENT)
Dept: FAMILY MEDICINE | Facility: CLINIC | Age: 25
End: 2025-08-14
Payer: COMMERCIAL